# Patient Record
Sex: FEMALE | Race: WHITE | NOT HISPANIC OR LATINO | Employment: OTHER | URBAN - METROPOLITAN AREA
[De-identification: names, ages, dates, MRNs, and addresses within clinical notes are randomized per-mention and may not be internally consistent; named-entity substitution may affect disease eponyms.]

---

## 2018-11-19 ENCOUNTER — OFFICE VISIT (OUTPATIENT)
Dept: DERMATOLOGY | Facility: CLINIC | Age: 68
End: 2018-11-19
Payer: MEDICARE

## 2018-11-19 VITALS — BODY MASS INDEX: 18.78 KG/M2 | WEIGHT: 110 LBS | HEIGHT: 64 IN

## 2018-11-19 DIAGNOSIS — D18.01 CHERRY ANGIOMA: ICD-10-CM

## 2018-11-19 DIAGNOSIS — L81.4 SOLAR LENTIGO: ICD-10-CM

## 2018-11-19 DIAGNOSIS — L82.1 SEBORRHEIC KERATOSES: Primary | ICD-10-CM

## 2018-11-19 DIAGNOSIS — D23.9 DERMATOFIBROMA: ICD-10-CM

## 2018-11-19 DIAGNOSIS — D22.9 MULTIPLE BENIGN NEVI: ICD-10-CM

## 2018-11-19 PROCEDURE — 99203 OFFICE O/P NEW LOW 30 MIN: CPT | Mod: S$GLB,,, | Performed by: DERMATOLOGY

## 2018-11-19 PROCEDURE — 99999 PR PBB SHADOW E&M-EST. PATIENT-LVL II: CPT | Mod: PBBFAC,,, | Performed by: DERMATOLOGY

## 2018-11-19 RX ORDER — SERTRALINE HYDROCHLORIDE 50 MG/1
50 TABLET, FILM COATED ORAL DAILY
COMMUNITY
End: 2019-11-27 | Stop reason: SDUPTHER

## 2018-11-19 NOTE — PROGRESS NOTES
Subjective:       Patient ID:  Sylvia Maxwell is a 67 y.o. female who presents for   Chief Complaint   Patient presents with    Spot     right upper arm and right leg, x years, no change, no tx    Skin Check     TBSE     Initial visit  Intense sun exposure in youth and young adulthood  No tanning bed use  No personal h/o skin cancer  Requests TBSE for cancer screening        Spot  - Initial  Affected locations: right upper leg and right arm  Signs / symptoms: asymptomatic  Severity: mild  Timing: constant        Review of Systems   Constitutional: Negative for fever, chills and fatigue.   Skin: Positive for daily sunscreen use and activity-related sunscreen use. Negative for wears hat.   Hematologic/Lymphatic: Bruises/bleeds easily.        Objective:    Physical Exam   Constitutional: She appears well-developed and well-nourished. No distress.   Neurological: She is alert and oriented to person, place, and time. She is not disoriented.   Psychiatric: She has a normal mood and affect.   Skin:   Areas Examined (abnormalities noted in diagram):   Scalp / Hair Palpated and Inspected  Head / Face Inspection Performed  Neck Inspection Performed  Chest / Axilla Inspection Performed  Abdomen Inspection Performed  Genitals / Buttocks / Groin Inspection Performed  Back Inspection Performed  RUE Inspected  LUE Inspection Performed  RLE Inspected  LLE Inspection Performed  Nails and Digits Inspection Performed              Diagram Legend     Erythematous scaling macule/papule c/w actinic keratosis       Vascular papule c/w angioma      Pigmented verrucoid papule/plaque c/w seborrheic keratosis      Yellow umbilicated papule c/w sebaceous hyperplasia      Irregularly shaped tan macule c/w lentigo     1-2 mm smooth white papules consistent with Milia      Movable subcutaneous cyst with punctum c/w epidermal inclusion cyst      Subcutaneous movable cyst c/w pilar cyst      Firm pink to brown papule c/w dermatofibroma       Pedunculated fleshy papule(s) c/w skin tag(s)      Evenly pigmented macule c/w junctional nevus     Mildly variegated pigmented, slightly irregular-bordered macule c/w mildly atypical nevus      Flesh colored to evenly pigmented papule c/w intradermal nevus       Pink pearly papule/plaque c/w basal cell carcinoma      Erythematous hyperkeratotic cursted plaque c/w SCC      Surgical scar with no sign of skin cancer recurrence      Open and closed comedones      Inflammatory papules and pustules      Verrucoid papule consistent consistent with wart     Erythematous eczematous patches and plaques     Dystrophic onycholytic nail with subungual debris c/w onychomycosis     Umbilicated papule    Erythematous-base heme-crusted tan verrucoid plaque consistent with inflamed seborrheic keratosis     Erythematous Silvery Scaling Plaque c/w Psoriasis     See annotation      Assessment / Plan:        Seborrheic keratoses  These are benign inherited growths without a malignant potential. Reassurance given to patient. No treatment is necessary.     Solar lentigo  This is a benign hyperpigmented sun induced lesion. Daily sun protection will reduce the number of new lesions. Treatment of these benign lesions are considered cosmetic.    Cherry angioma  This is a benign vascular lesion. Reassurance given. No treatment required.     Dermatofibroma  This is a benign scar-like lesion secondary to minor trauma. No treatment required.     Multiple benign nevi  Discussed ABCDE's of nevi.  Monitor for new mole or moles that are becoming bigger, darker, irritated, or developing irregular borders. Brochure provided.    Patient instructed in importance in daily sun protection of at least spf 30. Mineral sunscreen ingredients preferred (Zinc +/- Titanium).   Recommend Elta MD for daily use on face and neck.  Patient encouraged to wear hat for all outdoor exposure.   Also discussed sun avoidance and use of protective clothing.           Follow-up  in about 1 year (around 11/19/2019).

## 2019-07-30 DIAGNOSIS — Z12.39 SCREENING BREAST EXAMINATION: Primary | ICD-10-CM

## 2019-09-04 ENCOUNTER — LAB VISIT (OUTPATIENT)
Dept: LAB | Facility: HOSPITAL | Age: 69
End: 2019-09-04
Attending: INTERNAL MEDICINE
Payer: MEDICARE

## 2019-09-04 DIAGNOSIS — E78.5 HYPERLIPEMIA: Primary | ICD-10-CM

## 2019-09-04 LAB
CHOLEST SERPL-MCNC: 218 MG/DL (ref 120–199)
CHOLEST/HDLC SERPL: 3 {RATIO} (ref 2–5)
HDLC SERPL-MCNC: 72 MG/DL (ref 40–75)
HDLC SERPL: 33 % (ref 20–50)
LDLC SERPL CALC-MCNC: 128 MG/DL (ref 63–159)
NONHDLC SERPL-MCNC: 146 MG/DL
TRIGL SERPL-MCNC: 90 MG/DL (ref 30–150)

## 2019-09-04 PROCEDURE — 80061 LIPID PANEL: CPT

## 2019-09-04 PROCEDURE — 36415 COLL VENOUS BLD VENIPUNCTURE: CPT

## 2019-09-20 ENCOUNTER — HOSPITAL ENCOUNTER (OUTPATIENT)
Dept: RADIOLOGY | Facility: HOSPITAL | Age: 69
Discharge: HOME | End: 2019-09-20
Attending: SPECIALIST
Payer: MEDICARE

## 2019-09-20 VITALS — BODY MASS INDEX: 18.78 KG/M2 | HEIGHT: 64 IN | WEIGHT: 110 LBS

## 2019-09-20 DIAGNOSIS — Z12.39 SCREENING BREAST EXAMINATION: ICD-10-CM

## 2019-09-20 PROCEDURE — 77067 SCR MAMMO BI INCL CAD: CPT | Mod: TC,PO

## 2019-09-25 ENCOUNTER — CLINICAL SUPPORT (OUTPATIENT)
Dept: FAMILY MEDICINE | Facility: CLINIC | Age: 69
End: 2019-09-25
Payer: MEDICARE

## 2019-09-25 DIAGNOSIS — Z23 NEED FOR INFLUENZA VACCINATION: Primary | ICD-10-CM

## 2019-09-25 PROCEDURE — G0008 ADMIN INFLUENZA VIRUS VAC: HCPCS | Mod: S$GLB,,, | Performed by: NURSE PRACTITIONER

## 2019-09-25 PROCEDURE — 90662 IIV NO PRSV INCREASED AG IM: CPT | Mod: S$GLB,,, | Performed by: NURSE PRACTITIONER

## 2019-09-25 PROCEDURE — G0008 FLU VACCINE - HIGH DOSE (65+) PRESERVATIVE FREE IM: ICD-10-PCS | Mod: S$GLB,,, | Performed by: NURSE PRACTITIONER

## 2019-09-25 PROCEDURE — 90662 FLU VACCINE - HIGH DOSE (65+) PRESERVATIVE FREE IM: ICD-10-PCS | Mod: S$GLB,,, | Performed by: NURSE PRACTITIONER

## 2019-11-18 ENCOUNTER — HOSPITAL ENCOUNTER (EMERGENCY)
Facility: HOSPITAL | Age: 69
Discharge: HOME | End: 2019-11-18
Attending: EMERGENCY MEDICINE
Payer: MEDICARE

## 2019-11-18 ENCOUNTER — TELEPHONE (OUTPATIENT)
Dept: FAMILY MEDICINE | Facility: CLINIC | Age: 69
End: 2019-11-18

## 2019-11-18 VITALS
SYSTOLIC BLOOD PRESSURE: 121 MMHG | HEART RATE: 61 BPM | HEIGHT: 63 IN | BODY MASS INDEX: 19.49 KG/M2 | TEMPERATURE: 98 F | WEIGHT: 110 LBS | DIASTOLIC BLOOD PRESSURE: 64 MMHG | RESPIRATION RATE: 18 BRPM | OXYGEN SATURATION: 100 %

## 2019-11-18 DIAGNOSIS — R07.9 NONSPECIFIC CHEST PAIN: Primary | ICD-10-CM

## 2019-11-18 PROBLEM — R07.89 ATYPICAL CHEST PAIN: Status: ACTIVE | Noted: 2019-11-18

## 2019-11-18 LAB
ALBUMIN SERPL BCP-MCNC: 4.4 G/DL (ref 3.5–5.2)
ALP SERPL-CCNC: 59 U/L (ref 55–135)
ALT SERPL W/O P-5'-P-CCNC: 20 U/L (ref 10–44)
AMPHET+METHAMPHET UR QL: NEGATIVE
AMYLASE SERPL-CCNC: 38 U/L (ref 20–110)
ANION GAP SERPL CALC-SCNC: 9 MMOL/L (ref 8–16)
APTT PPP: 32.3 SEC (ref 23.6–33.3)
AST SERPL-CCNC: 25 U/L (ref 10–40)
BARBITURATES UR QL SCN>200 NG/ML: NEGATIVE
BASOPHILS # BLD AUTO: 0.09 K/UL (ref 0–0.2)
BASOPHILS NFR BLD: 1.4 % (ref 0–1.9)
BENZODIAZ UR QL SCN>200 NG/ML: NEGATIVE
BILIRUB SERPL-MCNC: 1.2 MG/DL (ref 0.1–1)
BILIRUB UR QL STRIP: NEGATIVE
BNP SERPL-MCNC: 133 PG/ML (ref 0–99)
BUN SERPL-MCNC: 14 MG/DL (ref 8–23)
BZE UR QL SCN: NEGATIVE
CALCIUM SERPL-MCNC: 9.4 MG/DL (ref 8.7–10.5)
CANNABINOIDS UR QL SCN: NEGATIVE
CHLORIDE SERPL-SCNC: 98 MMOL/L (ref 95–110)
CK MB SERPL-MCNC: 1.8 NG/ML (ref 0.1–6.5)
CK SERPL-CCNC: 77 U/L (ref 20–180)
CLARITY UR: CLEAR
CO2 SERPL-SCNC: 28 MMOL/L (ref 23–29)
COLOR UR: COLORLESS
CREAT SERPL-MCNC: 0.5 MG/DL (ref 0.5–1.4)
CREAT UR-MCNC: 21 MG/DL (ref 15–325)
D DIMER PPP IA.FEU-MCNC: 1.33 UG/ML FEU
DIFFERENTIAL METHOD: ABNORMAL
EOSINOPHIL # BLD AUTO: 0 K/UL (ref 0–0.5)
EOSINOPHIL NFR BLD: 0.2 % (ref 0–8)
ERYTHROCYTE [DISTWIDTH] IN BLOOD BY AUTOMATED COUNT: 12.7 % (ref 11.5–14.5)
EST. GFR  (AFRICAN AMERICAN): >60 ML/MIN/1.73 M^2
EST. GFR  (NON AFRICAN AMERICAN): >60 ML/MIN/1.73 M^2
GLUCOSE SERPL-MCNC: 93 MG/DL (ref 70–110)
GLUCOSE UR QL STRIP: NEGATIVE
HCT VFR BLD AUTO: 37.2 % (ref 37–48.5)
HGB BLD-MCNC: 12.5 G/DL (ref 12–16)
HGB UR QL STRIP: NEGATIVE
IMM GRANULOCYTES # BLD AUTO: 0.02 K/UL (ref 0–0.04)
IMM GRANULOCYTES NFR BLD AUTO: 0.3 % (ref 0–0.5)
INR PPP: 1
KETONES UR QL STRIP: ABNORMAL
LEUKOCYTE ESTERASE UR QL STRIP: NEGATIVE
LIPASE SERPL-CCNC: 32 U/L (ref 4–60)
LYMPHOCYTES # BLD AUTO: 1.3 K/UL (ref 1–4.8)
LYMPHOCYTES NFR BLD: 20.4 % (ref 18–48)
MAGNESIUM SERPL-MCNC: 2.3 MG/DL (ref 1.6–2.6)
MCH RBC QN AUTO: 31.7 PG (ref 27–31)
MCHC RBC AUTO-ENTMCNC: 33.6 G/DL (ref 32–36)
MCV RBC AUTO: 94 FL (ref 82–98)
MONOCYTES # BLD AUTO: 0.6 K/UL (ref 0.3–1)
MONOCYTES NFR BLD: 9.5 % (ref 4–15)
NEUTROPHILS # BLD AUTO: 4.3 K/UL (ref 1.8–7.7)
NEUTROPHILS NFR BLD: 68.2 % (ref 38–73)
NITRITE UR QL STRIP: NEGATIVE
NRBC BLD-RTO: 0 /100 WBC
OPIATES UR QL SCN: NEGATIVE
PCP UR QL SCN>25 NG/ML: NEGATIVE
PH UR STRIP: 8 [PH] (ref 5–8)
PLATELET # BLD AUTO: 198 K/UL (ref 150–350)
PMV BLD AUTO: 11.7 FL (ref 9.2–12.9)
POTASSIUM SERPL-SCNC: 4 MMOL/L (ref 3.5–5.1)
PROT SERPL-MCNC: 7.3 G/DL (ref 6–8.4)
PROT UR QL STRIP: NEGATIVE
PROTHROMBIN TIME: 13.1 SEC (ref 10.6–14.8)
RBC # BLD AUTO: 3.94 M/UL (ref 4–5.4)
SODIUM SERPL-SCNC: 135 MMOL/L (ref 136–145)
SP GR UR STRIP: 1 (ref 1–1.03)
TOXICOLOGY INFORMATION: NORMAL
TROPONIN I SERPL DL<=0.01 NG/ML-MCNC: <0.03 NG/ML (ref 0.02–0.04)
TROPONIN I SERPL DL<=0.01 NG/ML-MCNC: <0.03 NG/ML (ref 0.02–0.04)
URN SPEC COLLECT METH UR: ABNORMAL
UROBILINOGEN UR STRIP-ACNC: NEGATIVE EU/DL
WBC # BLD AUTO: 6.24 K/UL (ref 3.9–12.7)

## 2019-11-18 PROCEDURE — 84484 ASSAY OF TROPONIN QUANT: CPT | Mod: 91

## 2019-11-18 PROCEDURE — 85730 THROMBOPLASTIN TIME PARTIAL: CPT

## 2019-11-18 PROCEDURE — 80053 COMPREHEN METABOLIC PANEL: CPT

## 2019-11-18 PROCEDURE — 81003 URINALYSIS AUTO W/O SCOPE: CPT | Mod: 59

## 2019-11-18 PROCEDURE — 99285 EMERGENCY DEPT VISIT HI MDM: CPT | Mod: 25

## 2019-11-18 PROCEDURE — 85610 PROTHROMBIN TIME: CPT

## 2019-11-18 PROCEDURE — 36415 COLL VENOUS BLD VENIPUNCTURE: CPT

## 2019-11-18 PROCEDURE — 83690 ASSAY OF LIPASE: CPT

## 2019-11-18 PROCEDURE — 85025 COMPLETE CBC W/AUTO DIFF WBC: CPT

## 2019-11-18 PROCEDURE — 93005 ELECTROCARDIOGRAM TRACING: CPT

## 2019-11-18 PROCEDURE — 83880 ASSAY OF NATRIURETIC PEPTIDE: CPT

## 2019-11-18 PROCEDURE — 82150 ASSAY OF AMYLASE: CPT

## 2019-11-18 PROCEDURE — 82550 ASSAY OF CK (CPK): CPT

## 2019-11-18 PROCEDURE — 85379 FIBRIN DEGRADATION QUANT: CPT

## 2019-11-18 PROCEDURE — 83735 ASSAY OF MAGNESIUM: CPT

## 2019-11-18 PROCEDURE — 25500020 PHARM REV CODE 255: Performed by: EMERGENCY MEDICINE

## 2019-11-18 PROCEDURE — 82553 CREATINE MB FRACTION: CPT

## 2019-11-18 PROCEDURE — 84484 ASSAY OF TROPONIN QUANT: CPT

## 2019-11-18 PROCEDURE — 80307 DRUG TEST PRSMV CHEM ANLYZR: CPT

## 2019-11-18 RX ORDER — METHOCARBAMOL 500 MG/1
500 TABLET, FILM COATED ORAL EVERY 8 HOURS PRN
Qty: 8 TABLET | Refills: 0 | Status: SHIPPED | OUTPATIENT
Start: 2019-11-18 | End: 2019-11-23

## 2019-11-18 RX ADMIN — IOHEXOL 70 ML: 350 INJECTION, SOLUTION INTRAVENOUS at 09:11

## 2019-11-18 NOTE — TELEPHONE ENCOUNTER
----- Message from Alena Jackson sent at 11/18/2019 10:55 AM CST -----  Pt wants to be seen today. Pt is having left shoulder blade pain and it is radiating to her left breast as well. Pt #[phone]

## 2019-11-18 NOTE — ED NOTES
PT PRESENTS TO ED WITH C/O LEFT SHOULDER AND CHEST PAIN. NAD NOTED, CALL LIGHT IN REACH, WILL MONITOR.

## 2019-11-19 NOTE — SUBJECTIVE & OBJECTIVE
Past medical history:  Tendonitis, cervical radiculopathy, hyperlipidemia    Past Surgical History:   Procedure Laterality Date    BREAST CYST ASPIRATION Left     left wrist      arthritis    TUBAL LIGATION         Review of patient's allergies indicates:  No Known Allergies    No current facility-administered medications on file prior to encounter.      Current Outpatient Medications on File Prior to Encounter   Medication Sig    calcium-vitamin D3 (CALCIUM 500 + D) 500 mg(1,250mg) -200 unit per tablet Take 1 tablet by mouth 2 (two) times daily with meals.    multivit-min-iron-FA-K.gin (CENTRUM PERFORMANCE) 18 mg iron-400 mcg-50 mg Tab Take 1 tablet by mouth once daily.     sertraline (ZOLOFT) 50 MG tablet Take 50 mg by mouth once daily.    ranitidine (ZANTAC) 150 MG tablet Take 150 mg by mouth 2 (two) times daily.    [DISCONTINUED] diazepam (VALIUM) 2 MG tablet Take 1 tablet (2 mg total) by mouth as directed.     Family History     Reviewed and noncontributory        Tobacco Use    Smoking status: Former Smoker    Smokeless tobacco: Never Used   Substance and Sexual Activity    Alcohol use: Not on file    Drug use: Not on file    Sexual activity: Not on file       Objective:     Vital Signs (Most Recent):  Temp: 98.2 °F (36.8 °C) (11/18/19 1500)  Pulse: 66 (11/18/19 1900)  Resp: 16 (11/18/19 1900)  BP: 129/80 (11/18/19 1900)  SpO2: 99 % (11/18/19 1900) Vital Signs (24h Range):  Temp:  [98.2 °F (36.8 °C)] 98.2 °F (36.8 °C)  Pulse:  [66-75] 66  Resp:  [16-63] 16  SpO2:  [99 %-100 %] 99 %  BP: (120-133)/(62-80) 129/80     Weight: 49.9 kg (110 lb)  Body mass index is 19.49 kg/m².    Physical Exam   Constitutional: She is oriented to person, place, and time. She appears well-developed. She is cooperative.   HENT:   Head: Normocephalic and atraumatic.   Mouth/Throat: Oropharynx is clear and moist.   Eyes: Pupils are equal, round, and reactive to light. Conjunctivae and EOM are normal.   Neck: Neck supple.  No JVD present. No thyromegaly present.   Cardiovascular: Normal rate, regular rhythm, S1 normal, S2 normal and intact distal pulses. Exam reveals no gallop and no friction rub.   No murmur heard.  Pulmonary/Chest: Effort normal and breath sounds normal. No accessory muscle usage. No tachypnea. No respiratory distress. She has no wheezes. She has no rales.   Abdominal: Soft. Bowel sounds are normal. She exhibits no distension and no mass. There is no tenderness. There is no rebound and no guarding.   Musculoskeletal: Normal range of motion. She exhibits no edema, tenderness or deformity.   Neurological: She is alert and oriented to person, place, and time. She has normal strength and normal reflexes. No cranial nerve deficit or sensory deficit.   Skin: Skin is warm. No rash noted.   Psychiatric: She has a normal mood and affect. Her speech is normal and behavior is normal. Cognition and memory are normal.       Significant Labs:   CBC:   Recent Labs   Lab 11/18/19  1510   WBC 6.24   HGB 12.5   HCT 37.2        CMP:   Recent Labs   Lab 11/18/19  1510   *   K 4.0   CL 98   CO2 28   GLU 93   BUN 14   CREATININE 0.5   CALCIUM 9.4   PROT 7.3   ALBUMIN 4.4   BILITOT 1.2*   ALKPHOS 59   AST 25   ALT 20   ANIONGAP 9   EGFRNONAA >60.0     Cardiac Markers:   Recent Labs   Lab 11/18/19  1840   *       Significant Imaging: I have reviewed all pertinent imaging results/findings within the past 24 hours.     Imaging Results          X-Ray Chest PA And Lateral (Final result)  Result time 11/18/19 15:59:28   Procedure changed from X-Ray Chest AP Portable     Final result by Alejandra Small MD (11/18/19 15:59:28)                 Impression:      Normal chest.      Electronically signed by: Alejandra Small MD  Date:    11/18/2019  Time:    15:59             Narrative:    EXAMINATION:  XR CHEST PA AND LATERAL    CLINICAL HISTORY:  Pain;  Chest pain, unspecified    FINDINGS:  PA and lateral chest is compared to  07/16/2017 shows normal cardiomediastinal silhouette.    Lungs are clear. Pulmonary vasculature is normal. No acute osseous abnormality.

## 2019-11-19 NOTE — HPI
Patient is a 68-year-old  female with history of hyperlipidemia as well as several musculoskeletal issues including but not limited to cervical spondylosis, degenerative disc disease, cervical radiculopathy and left shoulder tendinitis presents to the ED with chief complaint of acute onset chest pain.    Chest pain started acutely yesterday.  It is described as dull in nature.  No radiation of pain. Is located over the left chest wall.  No exacerbating or relieving factors.  No symptoms in the past.  Also has associated left posterior shoulder pain. Patient denies associated shortness of breath, diaphoresis, lightheadedness/dizziness, palpitations or lower extremity edema. Reports having had a stress test done in 2016 which is unremarkable.  Saw her cardiologist about 2 months ago at which time she was not mildly elevated total cholesterol level which is being managed by lifestyle modification.    In the ER:  Hemodynamically stable.  Lab work including initial troponin within normal limits.  EKG revealed normal sinus rhythm without any acute ST T wave changes.  Chest x-ray without any acute cardiopulmonary process.  D-dimer slightly elevated. She is getting a CTA chest to rule out pulmonary embolism.    Rest of the 10 point review of systems is negative except as mentioned above.

## 2019-11-19 NOTE — ED PROVIDER NOTES
Encounter Date: 11/18/2019       History     Chief Complaint   Patient presents with    Chest Pain     This is a pleasant 68-year-old female who presents complaining of left upper neck pain after lifting a heavy object as well as intermittent left anterior chest pain. The patient states that yesterday she lifted a heavy concrete base.  She has also been lifting other objects recently.  Yesterday evening she developed an achy pain to the left trapezius and scapular area that was worse with palpation and movement and better with certain neck positioning.  The patient also states that her neck had been feeling slightly sore after sleeping on a sofa.  The patient reports that today she has in intermittent but now resolved episodes of some aching pain to her left upper anterior chest.  The pain seems to be associated with the trapezius pain and scapular pain and is at times worse with movement and palpation. She denies any associated chest pain, shortness of breath or pleuritic pain. She denies any weakness dizziness lightheadedness palpitations syncope or near-syncope.  She denies nausea or diaphoresis.  The pain is not exertional in nature.  After the pain is started she walked up a flight of stairs and did not have any pain or discomfort while walking up the stairs.  She does exercise on a treadmill on a regular basis and has never had chest pain or any shortness of breath while exercising.  Patient has had neck problems in the past.  She feels the symptoms are similar to that but was uncertain if the chest pain were related or not.  She does not smoke.  She denies having any family history of coronary artery disease.  She has had a previous cardiac evaluation with Dr. Aguillon  And reports a hx of MVP.  She feels well otherwise and denies any other problems or complaints. Patient states she had a negative angiogram in 2004.        Review of patient's allergies indicates:  No Known Allergies  No past medical history  on file.  Past Surgical History:   Procedure Laterality Date    BREAST CYST ASPIRATION Left     left wrist      arthritis    TUBAL LIGATION       Family History   Problem Relation Age of Onset    Eczema Neg Hx     Lupus Neg Hx     Psoriasis Neg Hx     Melanoma Neg Hx      Social History     Tobacco Use    Smoking status: Former Smoker    Smokeless tobacco: Never Used   Substance Use Topics    Alcohol use: Not on file    Drug use: Not on file     Review of Systems   Constitutional: Negative for activity change, appetite change, chills, diaphoresis, fatigue, fever and unexpected weight change.   HENT: Negative for congestion, dental problem, ear pain, rhinorrhea, sinus pressure, sinus pain, sore throat and trouble swallowing.    Eyes: Negative for photophobia, pain, redness and visual disturbance.   Respiratory: Negative for cough, choking, chest tightness, shortness of breath and wheezing.    Cardiovascular: Positive for chest pain. Negative for palpitations and leg swelling.   Gastrointestinal: Negative for abdominal distention, abdominal pain, anal bleeding, blood in stool, constipation, diarrhea, nausea and vomiting.   Genitourinary: Negative for decreased urine volume, difficulty urinating, dysuria, flank pain, frequency, hematuria, pelvic pain and urgency.   Musculoskeletal: Positive for back pain. Negative for arthralgias, gait problem, joint swelling, myalgias, neck pain and neck stiffness.        Left upper back pain and trapezius pain after lifting the heavy object.  No other myalgias or arthralgias.   Skin: Negative for pallor and rash.   Neurological: Negative.  Negative for dizziness, tremors, seizures, syncope, facial asymmetry, speech difficulty, weakness, light-headedness, numbness and headaches.   Hematological: Negative.  Does not bruise/bleed easily.   Psychiatric/Behavioral: Negative.  Negative for confusion.   All other systems reviewed and are negative.      Physical Exam     Initial  Vitals [11/18/19 1500]   BP Pulse Resp Temp SpO2   128/62 75 17 98.2 °F (36.8 °C) 99 %      MAP       --         Physical Exam    Nursing note and vitals reviewed.  Constitutional: She is active and cooperative.  Non-toxic appearance. She does not have a sickly appearance. She does not appear ill. No distress.   HENT:   Head: Normocephalic and atraumatic.   Nose: Nose normal.   Mouth/Throat: Uvula is midline, oropharynx is clear and moist and mucous membranes are normal. No oropharyngeal exudate.   Eyes: Conjunctivae, EOM and lids are normal. Pupils are equal, round, and reactive to light. No scleral icterus.   Neck: Trachea normal, normal range of motion, full passive range of motion without pain and phonation normal. Neck supple. No thyroid mass and no thyromegaly present. No stridor present. Muscular tenderness present. No spinous process tenderness present. No edema, no erythema and normal range of motion present. No neck rigidity. No JVD present.   Left trapezius and upper back/scapular muscular pain on palpation. No edema no ecchymosis no skin changes or increased warmth.  Left upper anterior chest wall with mild tenderness on palpation.   Cardiovascular: Normal rate, regular rhythm, normal heart sounds, intact distal pulses and normal pulses.   No murmur heard.  Pulmonary/Chest: Effort normal and breath sounds normal. No accessory muscle usage. No tachypnea. No respiratory distress.   Abdominal: Soft. Normal appearance and bowel sounds are normal. She exhibits no distension, no pulsatile midline mass and no mass. There is no tenderness. There is no rigidity, no guarding and no CVA tenderness.   Musculoskeletal: Normal range of motion. She exhibits no edema or tenderness.   Pulses are 2+ throughout, cap refill is less than 2 sec throughout, extremities are nontender throughout with full range of motion. There is no spinal tenderness to palpation.   Neurological: She is alert and oriented to person, place, and  time. She has normal strength. She displays normal reflexes. No cranial nerve deficit or sensory deficit.   No focal deficits.   Skin: Skin is intact. Capillary refill takes less than 2 seconds. No ecchymosis, no petechiae and no rash noted. No erythema. No pallor.   Psychiatric: She has a normal mood and affect. Her speech is normal and behavior is normal. Judgment and thought content normal. Cognition and memory are normal.         ED Course   Procedures  Labs Reviewed   URINALYSIS, REFLEX TO URINE CULTURE - Abnormal; Notable for the following components:       Result Value    Color, UA Colorless (*)     Specific [place], UA 1.000 (*)     Ketones, UA Trace (*)     All other components within normal limits    Narrative:     Specimen Source->Urine   COMPREHENSIVE METABOLIC PANEL - Abnormal; Notable for the following components:    Sodium 135 (*)     Total Bilirubin 1.2 (*)     All other components within normal limits   CBC W/ AUTO DIFFERENTIAL - Abnormal; Notable for the following components:    RBC 3.94 (*)     Mean Corpuscular Hemoglobin 31.7 (*)     All other components within normal limits   D DIMER, QUANTITATIVE - Abnormal; Notable for the following components:    D-Dimer 1.33 (*)     All other components within normal limits   B-TYPE NATRIURETIC PEPTIDE - Abnormal; Notable for the following components:     (*)     All other components within normal limits   TROPONIN I   DRUG SCREEN PANEL, URINE EMERGENCY    Narrative:     Specimen Source->Urine   CK   CK-MB   APTT   PROTIME-INR   AMYLASE   LIPASE   MAGNESIUM   D DIMER, QUANTITATIVE   B-TYPE NATRIURETIC PEPTIDE   TROPONIN I        ECG Results          EKG 12-lead (In process)  Result time 11/18/19 15:56:32    In process by Interface, Lab In [facility] (11/18/19 15:56:32)                 Narrative:    Test Reason : R07.9,    Vent. Rate : 062 BPM     Atrial Rate : 062 BPM     P-R Int : 144 ms          QRS Dur : 084 ms      QT Int : 416 ms       P-R-T Axes :  -15 070 066 degrees     QTc Int : 422 ms    Normal sinus rhythm  Normal ECG  No previous ECGs available    Referred By: [email or web address]   SELF           Confirmed By:                             Imaging Results          CTA Chest Non-Coronary (PE Study) (Final result)  Result time 11/18/19 21:16:02    Final result by Christopher Thomas MD (11/18/19 21:16:02)                 Narrative:        Exam: CTA OF THE CHEST WITH CONTRAST    Clinical data: Chest pain.    Technique: Axial CT angiography images through the lungs were acquired with  contrast and imaged using soft tissue and lung algorithms. Coronal, sagittal,  and 3D volume reconstructions were performed. Reformatted/3D-MPR images were  performed. Contrast used: Omnipaque 350. Amount: 100 mL. Radiation dose: CTDIvol  = 13.50  mGy, DLP = 138.80 mGy x cm.    Prior studies: No prior studies submitted.    Findings:    Lungs: Minimal dependent bibasilar groundglass opacities are identified.  No  pulmonary infiltrate identified. No pulmonary mass identified. No pleural  effusions identified. No pneumothorax. The airway is clear.    Soft Tissues: No mediastinal, axillary or supraclavicular adenopathy is  identified.    Vascular: No filling defect within the pulmonary arteries to the segmental  branch level. Unremarkable aorta. Grossly unremarkable sized heart.  No definite  abnormality seen on 3D reformatted images.    Bony structures: No acute or destructive abnormality    Upper Abdomen: Limited visualization of the solid upper abdominal organs is  grossly unremarkable.    IMPRESSION:    1.  No acute pulmonary thromboembolism, aortic dissection or thrombosis.  2.  No acute lung parenchymal abnormality, pleural or pericardial effusion.  3.  Minimal dependent bibasilar groundglass opacities.    Recommendation:  Follow up as clinically indicated.    All CT scans at this facility utilize dose modulation, iterative reconstruction,  and/or weight based dosing when appropriate to  reduce radiation dose to as low  as reasonably achievable.      Electronically Signed by MAURA OLIVEIRA MD at 11/18/2019 10:56:24 PM                             X-Ray Chest PA And Lateral (Final result)  Result time 11/18/19 15:59:28   Procedure changed from X-Ray Chest AP Portable     Final result by Alejandra Small MD (11/18/19 15:59:28)                 Impression:      Normal chest.      Electronically signed by: Alejandra Small MD  Date:    11/18/2019  Time:    15:59             Narrative:    EXAMINATION:  XR CHEST PA AND LATERAL    CLINICAL HISTORY:  Pain;  Chest pain, unspecified    FINDINGS:  PA and lateral chest is compared to 07/16/2017 shows normal cardiomediastinal silhouette.    Lungs are clear. Pulmonary vasculature is normal. No acute osseous abnormality.                                 Medical Decision Making:   Clinical Tests:   Lab Tests: Reviewed  Radiological Study: Reviewed  Medical Tests: Reviewed  ED Management:  I have consulted the hospitalist physician for admission.  Pain is atypical however the patient has not had any recent cardiac testing.  Pain is reproducible with palpation and did occur after lifting heavy object.  Patient has had neck problems and cervical radiculopathy in the past.  Patient has not had any exertional chest pain and has not had any shortness of breath nausea diaphoresis fatigue or other constitutional symptoms.  feel that the pt can be discharged to follow up outpatient with her cardiologist as he feels that symptoms are not currently suggestive of a cardiac etiology.  D-dimer was elevated.  The patient has no lower extremity pain or swelling. CTA of the chest is negative for pulmonary embolism.  I have also discussed the case with  -- cardiologist -- on-call for the patient's cardiologist .  He recommended a 2nd troponin as suggested as well by  and if negative then outpatient follow-up.  The patient strongly  preferred outpatient follow-up and did not want to be admitted to the hospital.  After hospitalist consultation and their recommendations of outpatient evaluation as well as Cardiology consultation and recommendation for outpatient evaluation along with patient's preference, patient will be discharged to follow up closely outpatient with her primary care physician and cardiologist.  Will prescribe Robaxin for the possibility of cervical radiculopathy/muscle spasms as a source of her discomfort.  Have discussed the appropriate pillow when sleeping.  Return precautions have been discussed in detail and strict importance of close outpatient evaluation has been discussed.  Patient voices understanding, feels well it is asymptomatic and feels ready to go home.                                 Clinical Impression:       ICD-10-CM ICD-9-CM   1. Nonspecific chest pain R07.9 786.50   2. Cervical radiculopathy                          Tricia Lockhart MD  11/19/19 0015

## 2019-11-19 NOTE — ASSESSMENT & PLAN NOTE
Could be musculoskeletal.  Less likely ACS  EKG with normal sinus rhythm and no acute ST- T wave changes  D-dimer elevated for which CTA chest was done - negative for pulmonary embolism  ACS ruled out by troponin x 2  Stable for discharge home with plan for outpatient stress test  Patient agreeable with this plan and is aware to contact her cardiologist tomorrow to schedule outpatient stress test

## 2019-11-19 NOTE — DISCHARGE INSTRUCTIONS
Please read and follow discharge instructions and return precautions.  Rest, avoid any strenuous activity or lifting.  Use a firm contour pillow when sleeping.  Robaxin as directed for possible muscle spasms--do not drive, drink alcohol or operate machinery when taking this medication.  Tylenol or ibuprofen over-the-counter as directed and if needed for neck pain or discomfort.  Very important to follow up closely outpatient with  tomorrow.  Important to see your primary care physician in the next 1-2 days.  Return immediately if you develop new symptoms, if you have any worsening of symptoms, or if you develop any symptoms such as shortness of breath lightheadedness nausea palpitations or any worsening of current symptoms.

## 2019-11-19 NOTE — CONSULTS
[facility] Medicine  Consult Note    Patient Name: Sylvia Maxwell  MRN: [id number]  Admission Date: 11/18/2019  Hospital Length of Stay: 0 days  Attending Physician: Tricia Lockhart MD   Primary Care Provider: Damion Puckett MD           Patient information was obtained from patient and ER records.     Consult requested by ER physician.    Subjective:     Principal Problem: Atypical chest pain    Chief Complaint:   Chief Complaint   Patient presents with    Chest Pain        HPI: Patient is a 68-year-old  female with history of hyperlipidemia as well as several musculoskeletal issues including but not limited to cervical spondylosis, degenerative disc disease, cervical radiculopathy and left shoulder tendinitis presents to the ED with chief complaint of acute onset chest pain.    Chest pain started acutely yesterday.  It is described as dull in nature.  No radiation of pain. Is located over the left chest wall.  No exacerbating or relieving factors.  No symptoms in the past.  Also has associated left posterior shoulder pain. Patient denies associated shortness of breath, diaphoresis, lightheadedness/dizziness, palpitations or lower extremity edema. Reports having had a stress test done in 2016 which is unremarkable.  Saw her cardiologist about 2 months ago at which time she was not mildly elevated total cholesterol level which is being managed by lifestyle modification.    In the ER:  Hemodynamically stable.  Lab work including initial troponin within normal limits.  EKG revealed normal sinus rhythm without any acute ST T wave changes.  Chest x-ray without any acute cardiopulmonary process.  D-dimer slightly elevated. She is getting a CTA chest to rule out pulmonary embolism.    Rest of the 10 point review of systems is negative except as mentioned above.    Past medical history:  Tendonitis, cervical radiculopathy, hyperlipidemia           Past Surgical History:   Procedure  Laterality Date    BREAST CYST ASPIRATION Left      left wrist         arthritis    TUBAL LIGATION             Review of patient's allergies indicates:  No Known Allergies     No current facility-administered medications on file prior to encounter.            Current Outpatient Medications on File Prior to Encounter   Medication Sig    calcium-vitamin D3 (CALCIUM 500 + D) 500 mg(1,250mg) -200 unit per tablet Take 1 tablet by mouth 2 (two) times daily with meals.    multivit-min-iron-FA-K.gin (CENTRUM PERFORMANCE) 18 mg iron-400 mcg-50 mg Tab Take 1 tablet by mouth once daily.     sertraline (ZOLOFT) 50 MG tablet Take 50 mg by mouth once daily.    ranitidine (ZANTAC) 150 MG tablet Take 150 mg by mouth 2 (two) times daily.    [DISCONTINUED] diazepam (VALIUM) 2 MG tablet Take 1 tablet (2 mg total) by mouth as directed.          Family History      Reviewed and noncontributory               Tobacco Use    Smoking status: Former Smoker    Smokeless tobacco: Never Used   Substance and Sexual Activity    Alcohol use: Not on file    Drug use: Not on file    Sexual activity: Not on file         Objective:      Vital Signs (Most Recent):  Temp: 98.2 °F (36.8 °C) (11/18/19 1500)  Pulse: 66 (11/18/19 1900)  Resp: 16 (11/18/19 1900)  BP: 129/80 (11/18/19 1900)  SpO2: 99 % (11/18/19 1900) Vital Signs (24h Range):  Temp:  [98.2 °F (36.8 °C)] 98.2 °F (36.8 °C)  Pulse:  [66-75] 66  Resp:  [16-63] 16  SpO2:  [99 %-100 %] 99 %  BP: (120-133)/(62-80) 129/80      Weight: 49.9 kg (110 lb)  Body mass index is 19.49 kg/m².     Physical Exam   Constitutional: She is oriented to person, place, and time. She appears well-developed. She is cooperative.   HENT:   Head: Normocephalic and atraumatic.   Mouth/Throat: Oropharynx is clear and moist.   Eyes: Pupils are equal, round, and reactive to light. Conjunctivae and EOM are normal.   Neck: Neck supple. No JVD present. No thyromegaly present.   Cardiovascular: Normal rate, regular  rhythm, S1 normal, S2 normal and intact distal pulses. Exam reveals no gallop and no friction rub.   No murmur heard.  Pulmonary/Chest: Effort normal and breath sounds normal. No accessory muscle usage. No tachypnea. No respiratory distress. She has no wheezes. She has no rales.   Abdominal: Soft. Bowel sounds are normal. She exhibits no distension and no mass. There is no tenderness. There is no rebound and no guarding.   Musculoskeletal: Normal range of motion. She exhibits no edema, tenderness or deformity.   Neurological: She is alert and oriented to person, place, and time. She has normal strength and normal reflexes. No cranial nerve deficit or sensory deficit.   Skin: Skin is warm. No rash noted.   Psychiatric: She has a normal mood and affect. Her speech is normal and behavior is normal. Cognition and memory are normal.         Significant Labs:   CBC:       Recent Labs   Lab 11/18/19  1510   WBC 6.24   HGB 12.5   HCT 37.2         CMP:       Recent Labs   Lab 11/18/19  1510   *   K 4.0   CL 98   CO2 28   GLU 93   BUN 14   CREATININE 0.5   CALCIUM 9.4   PROT 7.3   ALBUMIN 4.4   BILITOT 1.2*   ALKPHOS 59   AST 25   ALT 20   ANIONGAP 9   EGFRNONAA >60.0      Cardiac Markers:       Recent Labs   Lab 11/18/19  1840   *         Significant Imaging: I have reviewed all pertinent imaging results/findings within the past 24 hours.          Imaging Results            X-Ray Chest PA And Lateral (Final result)  Result time 11/18/19 15:59:28   Procedure changed from X-Ray Chest AP Portable                 Final result by Alejandra Small MD (11/18/19 15:59:28)                               Impression:        Normal chest.        Electronically signed by:     Alejandra Small MD  Date:                                            11/18/2019  Time:                                            15:59                         Narrative:     EXAMINATION:  XR CHEST PA AND LATERAL     CLINICAL HISTORY:  Pain;   Chest pain, unspecified     FINDINGS:  PA and lateral chest is compared to 07/16/2017 shows normal cardiomediastinal silhouette.     Lungs are clear. Pulmonary vasculature is normal. No acute osseous abnormality.                                             Assessment/Plan:     * Atypical chest pain  Could be musculoskeletal.  Less likely ACS  EKG with normal sinus rhythm and no acute ST- T wave changes  D-dimer elevated for which CTA chest was done - negative for pulmonary embolism  ACS ruled out by troponin x 2  Stable for discharge home with plan for outpatient stress test  Patient agreeable with this plan and is aware to contact her cardiologist tomorrow to schedule outpatient stress test     Instructions provided to follow up with primary care physician as outpatient. Patient verbalized understanding and is aware to contact primary care physician or return to ED if new or worsening symptoms.    VTE Risk Mitigation (From admission, onward)    None              Thank you for your consult. I will sign off. Please contact us if you have any additional questions.    Neftali Baez MD  Department of Hospital Medicine   [facility]

## 2019-11-19 NOTE — H&P
[facility] Medicine  History & Physical    Patient Name: Sylvia Maxwell  MRN: [id number]  Admission Date: 11/18/2019  Attending Physician: Tricia Lockhart MD   Primary Care Provider: Damion Puckett MD         Patient information was obtained from patient and ER records.     Subjective:     Principal Problem:Atypical chest pain    Chief Complaint:   Chief Complaint   Patient presents with    Chest Pain        HPI: Patient is a 68-year-old  female with history of hyperlipidemia as well as several musculoskeletal issues including but not limited to cervical spondylosis, degenerative disc disease, cervical radiculopathy and left shoulder tendinitis presents to the ED with chief complaint of acute onset chest pain.    Chest pain started acutely yesterday.  It is described as dull in nature.  No radiation of pain. Is located over the left chest wall.  No exacerbating or relieving factors.  No symptoms in the past.  Also has associated left posterior shoulder pain. Patient denies associated shortness of breath, diaphoresis, lightheadedness/dizziness, palpitations or lower extremity edema. Reports having had a stress test done in 2016 which is unremarkable.  Saw her cardiologist about 2 months ago at which time she was not mildly elevated total cholesterol level which is being managed by lifestyle modification.    In the ER:  Hemodynamically stable.  Lab work including initial troponin within normal limits.  EKG revealed normal sinus rhythm without any acute ST T wave changes.  Chest x-ray without any acute cardiopulmonary process.  D-dimer slightly elevated. She is getting a CTA chest to rule out pulmonary embolism.    Rest of the 10 point review of systems is negative except as mentioned above.    Past medical history:  Tendonitis, cervical radiculopathy, hyperlipidemia    Past Surgical History:   Procedure Laterality Date    BREAST CYST ASPIRATION Left     left wrist      arthritis     TUBAL LIGATION         Review of patient's allergies indicates:  No Known Allergies    No current facility-administered medications on file prior to encounter.      Current Outpatient Medications on File Prior to Encounter   Medication Sig    calcium-vitamin D3 (CALCIUM 500 + D) 500 mg(1,250mg) -200 unit per tablet Take 1 tablet by mouth 2 (two) times daily with meals.    multivit-min-iron-FA-K.gin (CENTRUM PERFORMANCE) 18 mg iron-400 mcg-50 mg Tab Take 1 tablet by mouth once daily.     sertraline (ZOLOFT) 50 MG tablet Take 50 mg by mouth once daily.    ranitidine (ZANTAC) 150 MG tablet Take 150 mg by mouth 2 (two) times daily.    [DISCONTINUED] diazepam (VALIUM) 2 MG tablet Take 1 tablet (2 mg total) by mouth as directed.     Family History     Reviewed and noncontributory        Tobacco Use    Smoking status: Former Smoker    Smokeless tobacco: Never Used   Substance and Sexual Activity    Alcohol use: Not on file    Drug use: Not on file    Sexual activity: Not on file       Objective:     Vital Signs (Most Recent):  Temp: 98.2 °F (36.8 °C) (11/18/19 1500)  Pulse: 66 (11/18/19 1900)  Resp: 16 (11/18/19 1900)  BP: 129/80 (11/18/19 1900)  SpO2: 99 % (11/18/19 1900) Vital Signs (24h Range):  Temp:  [98.2 °F (36.8 °C)] 98.2 °F (36.8 °C)  Pulse:  [66-75] 66  Resp:  [16-63] 16  SpO2:  [99 %-100 %] 99 %  BP: (120-133)/(62-80) 129/80     Weight: 49.9 kg (110 lb)  Body mass index is 19.49 kg/m².    Physical Exam   Constitutional: She is oriented to person, place, and time. She appears well-developed. She is cooperative.   HENT:   Head: Normocephalic and atraumatic.   Mouth/Throat: Oropharynx is clear and moist.   Eyes: Pupils are equal, round, and reactive to light. Conjunctivae and EOM are normal.   Neck: Neck supple. No JVD present. No thyromegaly present.   Cardiovascular: Normal rate, regular rhythm, S1 normal, S2 normal and intact distal pulses. Exam reveals no gallop and no friction rub.   No murmur  heard.  Pulmonary/Chest: Effort normal and breath sounds normal. No accessory muscle usage. No tachypnea. No respiratory distress. She has no wheezes. She has no rales.   Abdominal: Soft. Bowel sounds are normal. She exhibits no distension and no mass. There is no tenderness. There is no rebound and no guarding.   Musculoskeletal: Normal range of motion. She exhibits no edema, tenderness or deformity.   Neurological: She is alert and oriented to person, place, and time. She has normal strength and normal reflexes. No cranial nerve deficit or sensory deficit.   Skin: Skin is warm. No rash noted.   Psychiatric: She has a normal mood and affect. Her speech is normal and behavior is normal. Cognition and memory are normal.       Significant Labs:   CBC:   Recent Labs   Lab 11/18/19  1510   WBC 6.24   HGB 12.5   HCT 37.2        CMP:   Recent Labs   Lab 11/18/19  1510   *   K 4.0   CL 98   CO2 28   GLU 93   BUN 14   CREATININE 0.5   CALCIUM 9.4   PROT 7.3   ALBUMIN 4.4   BILITOT 1.2*   ALKPHOS 59   AST 25   ALT 20   ANIONGAP 9   EGFRNONAA >60.0     Cardiac Markers:   Recent Labs   Lab 11/18/19  1840   *       Significant Imaging: I have reviewed all pertinent imaging results/findings within the past 24 hours.     Imaging Results          X-Ray Chest PA And Lateral (Final result)  Result time 11/18/19 15:59:28   Procedure changed from X-Ray Chest AP Portable     Final result by Alejandra Small MD (11/18/19 15:59:28)                 Impression:      Normal chest.      Electronically signed by: Alejandra Small MD  Date:    11/18/2019  Time:    15:59             Narrative:    EXAMINATION:  XR CHEST PA AND LATERAL    CLINICAL HISTORY:  Pain;  Chest pain, unspecified    FINDINGS:  PA and lateral chest is compared to 07/16/2017 shows normal cardiomediastinal silhouette.    Lungs are clear. Pulmonary vasculature is normal. No acute osseous abnormality.                                  Assessment/Plan:      * Atypical chest pain  Could be musculoskeletal.  Less likely ACS  EKG with normal sinus rhythm and no acute ST- T wave changes  D-dimer elevated for which CTA chest was done - negative for pulmonary embolism  ACS ruled out by troponin x 2  Stable for discharge home with plan for outpatient stress test  Patient agreeable with this plan and is aware to contact her cardiologist tomorrow to schedule outpatient stress test     Instructions provided to follow up with primary care physician as outpatient. Patient verbalized understanding and is aware to contact primary care physician or return to ED if new or worsening symptoms.    VTE Risk Mitigation (From admission, onward)    None             Neftali Baez MD  Department of Hospital Medicine   [facility]

## 2019-11-20 ENCOUNTER — TELEPHONE (OUTPATIENT)
Dept: FAMILY MEDICINE | Facility: CLINIC | Age: 69
End: 2019-11-20

## 2019-11-20 NOTE — TELEPHONE ENCOUNTER
Spoke with pt and let her know she can be scheduled after stress test. Pt states she will call back next week and get scheduled.

## 2019-11-20 NOTE — TELEPHONE ENCOUNTER
----- Message from Adonis Vazquez sent at 11/20/2019  2:38 PM CST -----  Pt was in the er [facility] pt went in for back and shoulder blade pain that went to the breast. Pt is having a stress test next week.   Needing er f/u appt   Pt [phone]

## 2019-11-27 DIAGNOSIS — F32.A DEPRESSION, UNSPECIFIED DEPRESSION TYPE: Primary | ICD-10-CM

## 2019-11-27 RX ORDER — SERTRALINE HYDROCHLORIDE 50 MG/1
50 TABLET, FILM COATED ORAL DAILY
Qty: 30 TABLET | Refills: 5 | Status: SHIPPED | OUTPATIENT
Start: 2019-11-27 | End: 2019-12-27

## 2019-11-27 NOTE — TELEPHONE ENCOUNTER
----- Message from Adonis Vazquez sent at 11/27/2019 10:45 AM CST -----  Refills on zoloft   amisha jeny [place]   Pt [phone]

## 2019-12-06 ENCOUNTER — TELEPHONE (OUTPATIENT)
Dept: FAMILY MEDICINE | Facility: CLINIC | Age: 69
End: 2019-12-06

## 2019-12-06 NOTE — TELEPHONE ENCOUNTER
----- Message from Adonis Vazquez sent at 12/6/2019  9:55 AM CST -----  Pt is needing f.u from urgent care with bronchitis and sinusitis   Pt [phone]

## 2019-12-10 ENCOUNTER — OFFICE VISIT (OUTPATIENT)
Dept: FAMILY MEDICINE | Facility: CLINIC | Age: 69
End: 2019-12-10
Payer: MEDICARE

## 2019-12-10 VITALS
BODY MASS INDEX: 21.16 KG/M2 | DIASTOLIC BLOOD PRESSURE: 64 MMHG | SYSTOLIC BLOOD PRESSURE: 106 MMHG | HEIGHT: 62 IN | HEART RATE: 76 BPM | WEIGHT: 115 LBS

## 2019-12-10 DIAGNOSIS — F32.5 MAJOR DEPRESSIVE DISORDER WITH SINGLE EPISODE, IN REMISSION: ICD-10-CM

## 2019-12-10 DIAGNOSIS — J20.9 ACUTE BRONCHITIS, UNSPECIFIED ORGANISM: Primary | ICD-10-CM

## 2019-12-10 DIAGNOSIS — Z23 NEED FOR PNEUMOCOCCAL VACCINATION: ICD-10-CM

## 2019-12-10 PROCEDURE — 90732 PPSV23 VACC 2 YRS+ SUBQ/IM: CPT | Mod: S$GLB,,, | Performed by: NURSE PRACTITIONER

## 2019-12-10 PROCEDURE — G0009 PNEUMOCOCCAL POLYSACCHARIDE VACCINE 23-VALENT =>2YO SQ IM: ICD-10-PCS | Mod: S$GLB,,, | Performed by: NURSE PRACTITIONER

## 2019-12-10 PROCEDURE — 90732 PNEUMOCOCCAL POLYSACCHARIDE VACCINE 23-VALENT =>2YO SQ IM: ICD-10-PCS | Mod: S$GLB,,, | Performed by: NURSE PRACTITIONER

## 2019-12-10 PROCEDURE — 99213 PR OFFICE/OUTPT VISIT, EST, LEVL III, 20-29 MIN: ICD-10-PCS | Mod: 25,S$GLB,, | Performed by: NURSE PRACTITIONER

## 2019-12-10 PROCEDURE — 1159F PR MEDICATION LIST DOCUMENTED IN MEDICAL RECORD: ICD-10-PCS | Mod: S$GLB,,, | Performed by: NURSE PRACTITIONER

## 2019-12-10 PROCEDURE — 1101F PT FALLS ASSESS-DOCD LE1/YR: CPT | Mod: S$GLB,,, | Performed by: NURSE PRACTITIONER

## 2019-12-10 PROCEDURE — G0009 ADMIN PNEUMOCOCCAL VACCINE: HCPCS | Mod: S$GLB,,, | Performed by: NURSE PRACTITIONER

## 2019-12-10 PROCEDURE — 1159F MED LIST DOCD IN RCRD: CPT | Mod: S$GLB,,, | Performed by: NURSE PRACTITIONER

## 2019-12-10 PROCEDURE — 1101F PR PT FALLS ASSESS DOC 0-1 FALLS W/OUT INJ PAST YR: ICD-10-PCS | Mod: S$GLB,,, | Performed by: NURSE PRACTITIONER

## 2019-12-10 PROCEDURE — 99213 OFFICE O/P EST LOW 20 MIN: CPT | Mod: 25,S$GLB,, | Performed by: NURSE PRACTITIONER

## 2019-12-10 RX ORDER — ASCORBATE CALCIUM 500 MG
TABLET ORAL
COMMUNITY

## 2019-12-10 RX ORDER — MAGNESIUM 200 MG
TABLET ORAL
COMMUNITY

## 2019-12-10 RX ORDER — SERTRALINE HYDROCHLORIDE 25 MG/1
25 TABLET, FILM COATED ORAL DAILY
Qty: 30 TABLET | Refills: 1 | Status: SHIPPED | OUTPATIENT
Start: 2019-12-10 | End: 2020-03-30 | Stop reason: SDUPTHER

## 2019-12-10 RX ORDER — CODEINE PHOSPHATE AND GUAIFENESIN 10; 100 MG/5ML; MG/5ML
5 SOLUTION ORAL 3 TIMES DAILY PRN
Qty: 118 ML | Refills: 0 | Status: SHIPPED | OUTPATIENT
Start: 2019-12-10 | End: 2019-12-20

## 2019-12-10 RX ORDER — PROMETHAZINE HYDROCHLORIDE AND DEXTROMETHORPHAN HYDROBROMIDE 6.25; 15 MG/5ML; MG/5ML
SYRUP ORAL
COMMUNITY
Start: 2019-12-03 | End: 2020-10-13

## 2019-12-10 RX ORDER — CEFDINIR 300 MG/1
CAPSULE ORAL
COMMUNITY
Start: 2019-12-03 | End: 2020-04-20

## 2019-12-10 RX ORDER — BENZONATATE 100 MG/1
CAPSULE ORAL
COMMUNITY
Start: 2019-12-03 | End: 2020-04-20

## 2019-12-10 NOTE — PATIENT INSTRUCTIONS
Weaning from Zoloft:    25mg Tablets for 3 weeks  1/2 tablet (12.5mg) for 2 weeks  1/2 tablets every other day until ready to stop.    If unable to tolerate, feel free to resume previous dose of 50mg.

## 2019-12-10 NOTE — PROGRESS NOTES
SUBJECTIVE:    Patient ID: Sylvia Maxwell is a 69 y.o. female.    Chief Complaint: Follow-up (Urgent Care, ER// SW)    Presents after visit to urgent care on 12/3 where she was diagnosed with bronchitis/sinusitis. Given antibiotic injection and steroid injection, as well as prescription for Omnicef, tessalon, and promethazine-dextromethorphan cough medicine. She did not take the cough medicine out of concern for interaction with her Sertraline. She reports feeling better but is still dealing with nagging cough. Denies fever or productive cough. She would also like to discuss weaning off of sertraline. Has been on it for several years and the stressors that led to using it are no longer an issue.       Admission on 11/18/2019, Discharged on 11/18/2019   Component Date Value Ref Range Status    Troponin I 11/18/2019 <0.030  0.020 - 0.040 ng/mL Final    Benzodiazepines 11/18/2019 Negative   Final    Cocaine (Metab.) 11/18/2019 Negative   Final    Opiate Scrn, Ur 11/18/2019 Negative   Final    Barbiturate Screen, Ur 11/18/2019 Negative   Final    Amphetamine Screen, Ur 11/18/2019 Negative   Final    THC 11/18/2019 Negative   Final    Phencyclidine 11/18/2019 Negative   Final    Creatinine, Random Ur 11/18/2019 21.0  15.0 - 325.0 mg/dL Final    Toxicology Information 11/18/2019 SEE COMMENT   Final    Specimen UA 11/18/2019 Urine, Clean Catch   Final    Color, UA 11/18/2019 Colorless* Yellow, Straw, Nano Final    Appearance, UA 11/18/2019 Clear  Clear Final    pH, UA 11/18/2019 8.0  5.0 - 8.0 Final    Specific [place], UA 11/18/2019 1.000* 1.005 - 1.030 Final    Protein, UA 11/18/2019 Negative  Negative Final    Glucose, UA 11/18/2019 Negative  Negative Final    Ketones, UA 11/18/2019 Trace* Negative Final    Bilirubin (UA) 11/18/2019 Negative  Negative Final    Occult Blood UA 11/18/2019 Negative  Negative Final    Nitrite, UA 11/18/2019 Negative  Negative Final    Urobilinogen, UA 11/18/2019  Negative  Negative EU/dL Final    Leukocytes, UA 11/18/2019 Negative  Negative Final    CPK 11/18/2019 77  20 - 180 U/L Final    Sodium 11/18/2019 135* 136 - 145 mmol/L Final    Potassium 11/18/2019 4.0  3.5 - 5.1 mmol/L Final    Chloride 11/18/2019 98  95 - 110 mmol/L Final    CO2 11/18/2019 28  23 - 29 mmol/L Final    Glucose 11/18/2019 93  70 - 110 mg/dL Final    BUN, Bld 11/18/2019 14  8 - 23 mg/dL Final    Creatinine 11/18/2019 0.5  0.5 - 1.4 mg/dL Final    Calcium 11/18/2019 9.4  8.7 - 10.5 mg/dL Final    Total Protein 11/18/2019 7.3  6.0 - 8.4 g/dL Final    Albumin 11/18/2019 4.4  3.5 - 5.2 g/dL Final    Total Bilirubin 11/18/2019 1.2* 0.1 - 1.0 mg/dL Final    Alkaline Phosphatase 11/18/2019 59  55 - 135 U/L Final    AST 11/18/2019 25  10 - 40 U/L Final    ALT 11/18/2019 20  10 - 44 U/L Final    Anion Gap 11/18/2019 9  8 - 16 mmol/L Final    eGFR if African American 11/18/2019 >60.0  >60 mL/min/1.73 m^2 Final    eGFR if non African American 11/18/2019 >60.0  >60 mL/min/1.73 m^2 Final    CPK MB 11/18/2019 1.8  0.1 - 6.5 ng/mL Final    WBC 11/18/2019 6.24  3.90 - 12.70 K/uL Final    RBC 11/18/2019 3.94* 4.00 - 5.40 M/uL Final    Hemoglobin 11/18/2019 12.5  12.0 - 16.0 g/dL Final    Hematocrit 11/18/2019 37.2  37.0 - 48.5 % Final    Mean Corpuscular Volume 11/18/2019 94  82 - 98 fL Final    Mean Corpuscular Hemoglobin 11/18/2019 31.7* 27.0 - 31.0 pg Final    Mean Corpuscular Hemoglobin Conc 11/18/2019 33.6  32.0 - 36.0 g/dL Final    RDW 11/18/2019 12.7  11.5 - 14.5 % Final    Platelets 11/18/2019 198  150 - 350 K/uL Final    MPV 11/18/2019 11.7  9.2 - 12.9 fL Final    Immature Granulocytes 11/18/2019 0.3  0.0 - 0.5 % Final    Gran # (ANC) 11/18/2019 4.3  1.8 - 7.7 K/uL Final    Immature Grans (Abs) 11/18/2019 0.02  0.00 - 0.04 K/uL Final    Lymph # 11/18/2019 1.3  1.0 - 4.8 K/uL Final    Mono # 11/18/2019 0.6  0.3 - 1.0 K/uL Final    Eos # 11/18/2019 0.0  0.0 - 0.5 K/uL Final     Baso # 11/18/2019 0.09  0.00 - 0.20 K/uL Final    nRBC 11/18/2019 0  0 /100 WBC Final    Gran% 11/18/2019 68.2  38.0 - 73.0 % Final    Lymph% 11/18/2019 20.4  18.0 - 48.0 % Final    Mono% 11/18/2019 9.5  4.0 - 15.0 % Final    Eosinophil% 11/18/2019 0.2  0.0 - 8.0 % Final    Basophil% 11/18/2019 1.4  0.0 - 1.9 % Final    Differential Method 11/18/2019 Automated   Final    aPTT 11/18/2019 32.3  23.6 - 33.3 sec Final    PT 11/18/2019 13.1  10.6 - 14.8 sec Final    INR 11/18/2019 1.0   Final    Amylase 11/18/2019 38  20 - 110 U/L Final    Lipase 11/18/2019 32  4 - 60 U/L Final    Magnesium 11/18/2019 2.3  1.6 - 2.6 mg/dL Final    D-Dimer 11/18/2019 1.33* <0.50 ug/mL FEU Final    BNP 11/18/2019 133* 0 - 99 pg/mL Final    Troponin I 11/18/2019 <0.030  0.020 - 0.040 ng/mL Final   Lab Visit on 09/04/2019   Component Date Value Ref Range Status    Cholesterol 09/04/2019 218* 120 - 199 mg/dL Final    Triglycerides 09/04/2019 90  30 - 150 mg/dL Final    HDL 09/04/2019 72  40 - 75 mg/dL Final    LDL Cholesterol 09/04/2019 128.0  63.0 - 159.0 mg/dL Final    Hdl/Cholesterol Ratio 09/04/2019 33.0  20.0 - 50.0 % Final    Total Cholesterol/HDL Ratio 09/04/2019 3.0  2.0 - 5.0 Final    Non-HDL Cholesterol 09/04/2019 146  mg/dL Final       History reviewed. No pertinent past medical history.  Past Surgical History:   Procedure Laterality Date    BREAST CYST ASPIRATION Left     left wrist      arthritis    TUBAL LIGATION       Family History   Problem Relation Age of Onset    Eczema Neg Hx     Lupus Neg Hx     Psoriasis Neg Hx     Melanoma Neg Hx        Marital Status:   Alcohol History:  reports that she drinks alcohol.  Tobacco History:  reports that she has quit smoking. She has never used smokeless tobacco.  Drug History:  reports that she does not use drugs.    Review of patient's allergies indicates:  No Known Allergies    Current Outpatient Medications:     ascorbate calcium, vitamin C,  "500 mg Tab, Vitamin C 500 mg tablet  Take 1 tablet every day by oral route., Disp: , Rfl:     benzonatate (TESSALON) 100 MG capsule, , Disp: , Rfl:     calcium-vitamin D3 (CALCIUM 500 + D) 500 mg(1,250mg) -200 unit per tablet, Take 1 tablet by mouth 2 (two) times daily with meals., Disp: , Rfl:     cefdinir (OMNICEF) 300 MG capsule, , Disp: , Rfl:     flu vac ts 2016-17,4 yr up,/PF (FLU VAC TS 2014-15,36MOS+,,PF,) 45 mcg (15 mcg x 3)/0.5 mL, Fluzone 1717-7370(PF) 45 mcg (15 mcg x 3)/0.5 mL intramuscular syringe, Disp: , Rfl:     magnesium 200 mg Tab, magnesium, Disp: , Rfl:     multivit-min-iron-FA-K.gin (CENTRUM PERFORMANCE) 18 mg iron-400 mcg-50 mg Tab, Take 1 tablet by mouth once daily. , Disp: , Rfl:     promethazine-dextromethorphan (PROMETHAZINE-DM) 6.25-15 mg/5 mL Syrp, , Disp: , Rfl:     sertraline (ZOLOFT) 50 MG tablet, Take 1 tablet (50 mg total) by mouth once daily., Disp: 30 tablet, Rfl: 5    guaifenesin-codeine 100-10 mg/5 ml (TUSSI-ORGANIDIN NR)  mg/5 mL syrup, Take 5 mLs by mouth 3 (three) times daily as needed for Cough., Disp: 118 mL, Rfl: 0    sertraline (ZOLOFT) 25 MG tablet, Take 1 tablet (25 mg total) by mouth once daily., Disp: 30 tablet, Rfl: 1    Review of Systems   Constitutional: Negative for chills and fever.   HENT: Negative for congestion, ear pain, sinus pressure, sinus pain and sore throat.    Eyes: Negative for pain and redness.   Respiratory: Positive for cough. Negative for chest tightness, shortness of breath and wheezing.    Cardiovascular: Negative for chest pain.   Gastrointestinal: Negative for nausea and vomiting.   Musculoskeletal: Negative for myalgias and neck pain.   Neurological: Negative for dizziness, weakness and headaches.          Objective:      Vitals:    12/10/19 1512   BP: 106/64   Pulse: 76   Weight: 52.2 kg (115 lb)   Height: 5' 2" (1.575 m)     Body mass index is 21.03 kg/m².  Physical Exam   Constitutional: She is oriented to person, place, " and time. She appears well-developed and well-nourished.   HENT:   Head: Normocephalic.   Nose: Nose normal.   Eyes: Pupils are equal, round, and reactive to light.   Neck: Normal range of motion.   Cardiovascular: Normal rate and regular rhythm.   Pulmonary/Chest: Effort normal and breath sounds normal. She has no wheezes.   Lymphadenopathy:     She has no cervical adenopathy.   Neurological: She is alert and oriented to person, place, and time.   Skin: Skin is warm and dry.         Assessment:       1. Acute bronchitis, unspecified organism    2. Major depressive disorder with single episode, in remission    3. Need for pneumococcal vaccination         Plan:       Acute bronchitis, unspecified organism  -     guaifenesin-codeine 100-10 mg/5 ml (TUSSI-ORGANIDIN NR)  mg/5 mL syrup; Take 5 mLs by mouth 3 (three) times daily as needed for Cough.  Dispense: 118 mL; Refill: 0  - Symptoms much better. Finished Omnicef. Cough medicine prn.    Major depressive disorder with single episode, in remission  -     sertraline (ZOLOFT) 25 MG tablet; Take 1 tablet (25 mg total) by mouth once daily.  Dispense: 30 tablet; Refill: 1  - Weaning schedule given to patient. 25mg daily for 3 weeks, then 12.5mg daily for 2 weeks, then 12.5mg every other day until ready to stop.  - Restart 50mg dose if unable to tolerate weaning.    Need for pneumococcal vaccination  -     Pneumococcal Polysaccharide Vaccine (23 Valent) (SQ/IM)  - Given today      Follow up if symptoms worsen or fail to improve.

## 2020-01-21 ENCOUNTER — OFFICE VISIT (OUTPATIENT)
Dept: FAMILY MEDICINE | Facility: CLINIC | Age: 70
End: 2020-01-21
Payer: MEDICARE

## 2020-01-21 ENCOUNTER — TELEPHONE (OUTPATIENT)
Dept: FAMILY MEDICINE | Facility: CLINIC | Age: 70
End: 2020-01-21

## 2020-01-21 VITALS
WEIGHT: 117 LBS | DIASTOLIC BLOOD PRESSURE: 64 MMHG | SYSTOLIC BLOOD PRESSURE: 108 MMHG | HEART RATE: 76 BPM | BODY MASS INDEX: 21.53 KG/M2 | HEIGHT: 62 IN

## 2020-01-21 DIAGNOSIS — M79.642 PAIN IN BOTH HANDS: ICD-10-CM

## 2020-01-21 DIAGNOSIS — J30.1 ALLERGIC RHINITIS DUE TO POLLEN, UNSPECIFIED SEASONALITY: ICD-10-CM

## 2020-01-21 DIAGNOSIS — M79.641 PAIN IN BOTH HANDS: ICD-10-CM

## 2020-01-21 DIAGNOSIS — M54.9 BACK PAIN, UNSPECIFIED BACK LOCATION, UNSPECIFIED BACK PAIN LATERALITY, UNSPECIFIED CHRONICITY: ICD-10-CM

## 2020-01-21 DIAGNOSIS — M25.50 ARTHRALGIA, UNSPECIFIED JOINT: ICD-10-CM

## 2020-01-21 DIAGNOSIS — R05.9 COUGH: Primary | ICD-10-CM

## 2020-01-21 PROCEDURE — 1125F PR PAIN SEVERITY QUANTIFIED, PAIN PRESENT: ICD-10-PCS | Mod: S$GLB,,, | Performed by: NURSE PRACTITIONER

## 2020-01-21 PROCEDURE — 99214 PR OFFICE/OUTPT VISIT, EST, LEVL IV, 30-39 MIN: ICD-10-PCS | Mod: S$GLB,,, | Performed by: NURSE PRACTITIONER

## 2020-01-21 PROCEDURE — 1159F MED LIST DOCD IN RCRD: CPT | Mod: S$GLB,,, | Performed by: NURSE PRACTITIONER

## 2020-01-21 PROCEDURE — 1125F AMNT PAIN NOTED PAIN PRSNT: CPT | Mod: S$GLB,,, | Performed by: NURSE PRACTITIONER

## 2020-01-21 PROCEDURE — 1101F PT FALLS ASSESS-DOCD LE1/YR: CPT | Mod: S$GLB,,, | Performed by: NURSE PRACTITIONER

## 2020-01-21 PROCEDURE — 1101F PR PT FALLS ASSESS DOC 0-1 FALLS W/OUT INJ PAST YR: ICD-10-PCS | Mod: S$GLB,,, | Performed by: NURSE PRACTITIONER

## 2020-01-21 PROCEDURE — 99214 OFFICE O/P EST MOD 30 MIN: CPT | Mod: S$GLB,,, | Performed by: NURSE PRACTITIONER

## 2020-01-21 PROCEDURE — 1159F PR MEDICATION LIST DOCUMENTED IN MEDICAL RECORD: ICD-10-PCS | Mod: S$GLB,,, | Performed by: NURSE PRACTITIONER

## 2020-01-21 RX ORDER — METHYLPREDNISOLONE 4 MG/1
TABLET ORAL
Qty: 1 PACKAGE | Refills: 0 | Status: SHIPPED | OUTPATIENT
Start: 2020-01-21 | End: 2020-02-11

## 2020-01-21 RX ORDER — TIZANIDINE 4 MG/1
4 TABLET ORAL EVERY 8 HOURS
Qty: 30 TABLET | Refills: 0 | Status: SHIPPED | OUTPATIENT
Start: 2020-01-21 | End: 2020-01-31

## 2020-01-21 RX ORDER — LEVOCETIRIZINE DIHYDROCHLORIDE 5 MG/1
5 TABLET, FILM COATED ORAL NIGHTLY
Qty: 30 TABLET | Refills: 11 | Status: SHIPPED | OUTPATIENT
Start: 2020-01-21 | End: 2020-10-13

## 2020-01-21 NOTE — PROGRESS NOTES
SUBJECTIVE:    Patient ID: Sylvia Maxwell is a 69 y.o. female.    Chief Complaint: Hand Pain (numbness in fingers, will req C-scope Debbie// SW); Hip Pain (for a while// SW); and Back Pain (lower back, no bottles// SW)    69-year-old female presents with complaints of bilateral hand pain.  Reports that had a surgery several years ago and since has intermittent pain.  Seems to be getting worse.  Pain is constant.  However it does increase with movement.  Also has been having lower back pain.  Radiates down into the knees.  No loss of bowel or bladder function.  Was previously seen a chiropractor which did seem to help for some time however quit seeing a chiropractor about 3 months ago and symptoms have returned.      Office Visit on 01/21/2020   Component Date Value Ref Range Status    Rheumatoid Factor 01/21/2020 <14  <14 IU/mL Final    Sed Rate 01/21/2020 9  < OR = 30 mm/h Final   Admission on 11/18/2019, Discharged on 11/18/2019   Component Date Value Ref Range Status    Troponin I 11/18/2019 <0.030  0.020 - 0.040 ng/mL Final    Benzodiazepines 11/18/2019 Negative   Final    Cocaine (Metab.) 11/18/2019 Negative   Final    Opiate Scrn, Ur 11/18/2019 Negative   Final    Barbiturate Screen, Ur 11/18/2019 Negative   Final    Amphetamine Screen, Ur 11/18/2019 Negative   Final    THC 11/18/2019 Negative   Final    Phencyclidine 11/18/2019 Negative   Final    Creatinine, Random Ur 11/18/2019 21.0  15.0 - 325.0 mg/dL Final    Toxicology Information 11/18/2019 SEE COMMENT   Final    Specimen UA 11/18/2019 Urine, Clean Catch   Final    Color, UA 11/18/2019 Colorless* Yellow, Straw, Nano Final    Appearance, UA 11/18/2019 Clear  Clear Final    pH, UA 11/18/2019 8.0  5.0 - 8.0 Final    Specific [place], UA 11/18/2019 1.000* 1.005 - 1.030 Final    Protein, UA 11/18/2019 Negative  Negative Final    Glucose, UA 11/18/2019 Negative  Negative Final    Ketones, UA 11/18/2019 Trace* Negative Final     Bilirubin (UA) 11/18/2019 Negative  Negative Final    Occult Blood UA 11/18/2019 Negative  Negative Final    Nitrite, UA 11/18/2019 Negative  Negative Final    Urobilinogen, UA 11/18/2019 Negative  Negative EU/dL Final    Leukocytes, UA 11/18/2019 Negative  Negative Final    CPK 11/18/2019 77  20 - 180 U/L Final    Sodium 11/18/2019 135* 136 - 145 mmol/L Final    Potassium 11/18/2019 4.0  3.5 - 5.1 mmol/L Final    Chloride 11/18/2019 98  95 - 110 mmol/L Final    CO2 11/18/2019 28  23 - 29 mmol/L Final    Glucose 11/18/2019 93  70 - 110 mg/dL Final    BUN, Bld 11/18/2019 14  8 - 23 mg/dL Final    Creatinine 11/18/2019 0.5  0.5 - 1.4 mg/dL Final    Calcium 11/18/2019 9.4  8.7 - 10.5 mg/dL Final    Total Protein 11/18/2019 7.3  6.0 - 8.4 g/dL Final    Albumin 11/18/2019 4.4  3.5 - 5.2 g/dL Final    Total Bilirubin 11/18/2019 1.2* 0.1 - 1.0 mg/dL Final    Alkaline Phosphatase 11/18/2019 59  55 - 135 U/L Final    AST 11/18/2019 25  10 - 40 U/L Final    ALT 11/18/2019 20  10 - 44 U/L Final    Anion Gap 11/18/2019 9  8 - 16 mmol/L Final    eGFR if African American 11/18/2019 >60.0  >60 mL/min/1.73 m^2 Final    eGFR if non African American 11/18/2019 >60.0  >60 mL/min/1.73 m^2 Final    CPK MB 11/18/2019 1.8  0.1 - 6.5 ng/mL Final    WBC 11/18/2019 6.24  3.90 - 12.70 K/uL Final    RBC 11/18/2019 3.94* 4.00 - 5.40 M/uL Final    Hemoglobin 11/18/2019 12.5  12.0 - 16.0 g/dL Final    Hematocrit 11/18/2019 37.2  37.0 - 48.5 % Final    Mean Corpuscular Volume 11/18/2019 94  82 - 98 fL Final    Mean Corpuscular Hemoglobin 11/18/2019 31.7* 27.0 - 31.0 pg Final    Mean Corpuscular Hemoglobin Conc 11/18/2019 33.6  32.0 - 36.0 g/dL Final    RDW 11/18/2019 12.7  11.5 - 14.5 % Final    Platelets 11/18/2019 198  150 - 350 K/uL Final    MPV 11/18/2019 11.7  9.2 - 12.9 fL Final    Immature Granulocytes 11/18/2019 0.3  0.0 - 0.5 % Final    Gran # (ANC) 11/18/2019 4.3  1.8 - 7.7 K/uL Final    Immature Grans  (Abs) 11/18/2019 0.02  0.00 - 0.04 K/uL Final    Lymph # 11/18/2019 1.3  1.0 - 4.8 K/uL Final    Mono # 11/18/2019 0.6  0.3 - 1.0 K/uL Final    Eos # 11/18/2019 0.0  0.0 - 0.5 K/uL Final    Baso # 11/18/2019 0.09  0.00 - 0.20 K/uL Final    nRBC 11/18/2019 0  0 /100 WBC Final    Gran% 11/18/2019 68.2  38.0 - 73.0 % Final    Lymph% 11/18/2019 20.4  18.0 - 48.0 % Final    Mono% 11/18/2019 9.5  4.0 - 15.0 % Final    Eosinophil% 11/18/2019 0.2  0.0 - 8.0 % Final    Basophil% 11/18/2019 1.4  0.0 - 1.9 % Final    Differential Method 11/18/2019 Automated   Final    aPTT 11/18/2019 32.3  23.6 - 33.3 sec Final    PT 11/18/2019 13.1  10.6 - 14.8 sec Final    INR 11/18/2019 1.0   Final    Amylase 11/18/2019 38  20 - 110 U/L Final    Lipase 11/18/2019 32  4 - 60 U/L Final    Magnesium 11/18/2019 2.3  1.6 - 2.6 mg/dL Final    D-Dimer 11/18/2019 1.33* <0.50 ug/mL FEU Final    BNP 11/18/2019 133* 0 - 99 pg/mL Final    Troponin I 11/18/2019 <0.030  0.020 - 0.040 ng/mL Final   Lab Visit on 09/04/2019   Component Date Value Ref Range Status    Cholesterol 09/04/2019 218* 120 - 199 mg/dL Final    Triglycerides 09/04/2019 90  30 - 150 mg/dL Final    HDL 09/04/2019 72  40 - 75 mg/dL Final    LDL Cholesterol 09/04/2019 128.0  63.0 - 159.0 mg/dL Final    Hdl/Cholesterol Ratio 09/04/2019 33.0  20.0 - 50.0 % Final    Total Cholesterol/HDL Ratio 09/04/2019 3.0  2.0 - 5.0 Final    Non-HDL Cholesterol 09/04/2019 146  mg/dL Final       History reviewed. No pertinent past medical history.  Past Surgical History:   Procedure Laterality Date    BREAST CYST ASPIRATION Left     left wrist      arthritis    TUBAL LIGATION       Family History   Problem Relation Age of Onset    Eczema Neg Hx     Lupus Neg Hx     Psoriasis Neg Hx     Melanoma Neg Hx        Marital Status:   Alcohol History:  reports that she drinks alcohol.  Tobacco History:  reports that she has quit smoking. She has never used smokeless  tobacco.  Drug History:  reports that she does not use drugs.    Review of patient's allergies indicates:  No Known Allergies    Current Outpatient Medications:     ascorbate calcium, vitamin C, 500 mg Tab, Vitamin C 500 mg tablet  Take 1 tablet every day by oral route., Disp: , Rfl:     benzonatate (TESSALON) 100 MG capsule, , Disp: , Rfl:     calcium-vitamin D3 (CALCIUM 500 + D) 500 mg(1,250mg) -200 unit per tablet, Take 1 tablet by mouth 2 (two) times daily with meals., Disp: , Rfl:     cefdinir (OMNICEF) 300 MG capsule, , Disp: , Rfl:     flu vac ts 2016-17,4 yr up,/PF (FLU VAC TS 2014-15,36MOS+,,PF,) 45 mcg (15 mcg x 3)/0.5 mL, Fluzone 9051-4407(PF) 45 mcg (15 mcg x 3)/0.5 mL intramuscular syringe, Disp: , Rfl:     levocetirizine (XYZAL) 5 MG tablet, Take 1 tablet (5 mg total) by mouth every evening., Disp: 30 tablet, Rfl: 11    magnesium 200 mg Tab, magnesium, Disp: , Rfl:     methylPREDNISolone (MEDROL DOSEPACK) 4 mg tablet, use as directed, Disp: 1 Package, Rfl: 0    multivit-min-iron-FA-K.gin (CENTRUM PERFORMANCE) 18 mg iron-400 mcg-50 mg Tab, Take 1 tablet by mouth once daily. , Disp: , Rfl:     mupirocin calcium 2% nasl oint (BACTROBAN) 2 % Oint, by Nasal route 2 (two) times daily. for 10 days, Disp: 1 g, Rfl: 0    promethazine-dextromethorphan (PROMETHAZINE-DM) 6.25-15 mg/5 mL Syrp, , Disp: , Rfl:     sertraline (ZOLOFT) 25 MG tablet, Take 1 tablet (25 mg total) by mouth once daily., Disp: 30 tablet, Rfl: 1    tiZANidine (ZANAFLEX) 4 MG tablet, Take 1 tablet (4 mg total) by mouth every 8 (eight) hours. for 10 days, Disp: 30 tablet, Rfl: 0    Review of Systems   Constitutional: Negative for chills.   HENT: Positive for sinus pressure and sneezing. Negative for rhinorrhea and sore throat.    Respiratory: Negative for cough and shortness of breath.    Cardiovascular: Negative for chest pain, palpitations and leg swelling.   Gastrointestinal: Negative for abdominal pain, diarrhea, nausea and  "vomiting.   Genitourinary: Negative for difficulty urinating, hematuria and vaginal bleeding.   Musculoskeletal: Positive for back pain. Negative for arthralgias.        Bilateral hand pain   Skin: Negative for rash.   Neurological: Negative for dizziness, weakness and headaches.   Psychiatric/Behavioral: Negative for agitation and sleep disturbance. The patient is not nervous/anxious.           Objective:      Vitals:    01/21/20 1352   BP: 108/64   Pulse: 76   Weight: 53.1 kg (117 lb)   Height: 5' 2" (1.575 m)     Body mass index is 21.4 kg/m².  Physical Exam   Constitutional: She is oriented to person, place, and time. She appears well-developed and well-nourished.   HENT:   Right Ear: External ear normal.   Left Ear: External ear normal.   Nose: Rhinorrhea present. Right sinus exhibits no frontal sinus tenderness. Left sinus exhibits no frontal sinus tenderness.   Mouth/Throat: Oropharynx is clear and moist.   Neck: Normal range of motion. Neck supple. No JVD present.   Cardiovascular: Normal rate and regular rhythm.   No murmur heard.  Pulmonary/Chest: Effort normal and breath sounds normal.   Abdominal: Soft. Bowel sounds are normal.   Musculoskeletal: She exhibits no edema.        Lumbar back: She exhibits decreased range of motion and tenderness. She exhibits no swelling and no deformity.        Right hand: She exhibits tenderness. She exhibits no deformity.        Left hand: She exhibits decreased range of motion and deformity.   Bilateral negative straight leg     Lymphadenopathy:     She has no cervical adenopathy.   Neurological: She is alert and oriented to person, place, and time. Gait normal.   Skin: Skin is warm, dry and intact. No rash noted.   Psychiatric: She has a normal mood and affect. Her speech is normal and behavior is normal.         Assessment:       1. Cough    2. Arthralgia, unspecified joint    3. Back pain, unspecified back location, unspecified back pain laterality, unspecified " chronicity    4. Pain in both hands    5. Allergic rhinitis due to pollen, unspecified seasonality         Plan:       Cough  -     X-Ray Chest PA And Lateral; Future; Expected date: 01/21/2020    Arthralgia, unspecified joint  -     Cancel: ANSLEY Screen w/Reflex; Future; Expected date: 01/21/2020  -     Cancel: Rheumatoid factor; Future; Expected date: 01/21/2020  -     Cancel: Sedimentation rate; Future; Expected date: 01/21/2020  -     X-Ray Lumbar Spine Ap And Lateral; Future  -     Sedimentation rate; Future; Expected date: 01/21/2020  -     Rheumatoid factor; Future; Expected date: 01/21/2020  -     ANSLEY Screen w/Reflex; Future; Expected date: 01/21/2020    Back pain, unspecified back location, unspecified back pain laterality, unspecified chronicity  -     X-Ray Lumbar Spine Ap And Lateral; Future  -     methylPREDNISolone (MEDROL DOSEPACK) 4 mg tablet; use as directed  Dispense: 1 Package; Refill: 0  -     tiZANidine (ZANAFLEX) 4 MG tablet; Take 1 tablet (4 mg total) by mouth every 8 (eight) hours. for 10 days  Dispense: 30 tablet; Refill: 0    Pain in both hands    Allergic rhinitis due to pollen, unspecified seasonality  -     levocetirizine (XYZAL) 5 MG tablet; Take 1 tablet (5 mg total) by mouth every evening.  Dispense: 30 tablet; Refill: 11    Other orders  -     mupirocin calcium 2% nasl oint (BACTROBAN) 2 % Oint; by Nasal route 2 (two) times daily. for 10 days  Dispense: 1 g; Refill: 0  -     Sedimentation rate, automated      Follow up in about 3 months (around 4/21/2020).

## 2020-01-21 NOTE — TELEPHONE ENCOUNTER
Pharmacy is calling to get a verbal on bactroban, asking if they can dispense the regular bactroban rather than the specific nasal ointment because it is more expensive. Verbal given.

## 2020-01-22 ENCOUNTER — TELEPHONE (OUTPATIENT)
Dept: FAMILY MEDICINE | Facility: CLINIC | Age: 70
End: 2020-01-22

## 2020-01-22 DIAGNOSIS — M25.50 ARTHRALGIA, UNSPECIFIED JOINT: Primary | ICD-10-CM

## 2020-01-22 NOTE — TELEPHONE ENCOUNTER
----- Message from Otoniel Bosch sent at 1/22/2020 10:43 AM CST -----  Contact: Oly Maxwell  Pt would like to know if x-ray older of lumbar spine  includes the hip and also she would like to know if she needs to make an appt for a hands specialist of is she waiting for lab results  Pt# [phone]

## 2020-01-24 ENCOUNTER — HOSPITAL ENCOUNTER (OUTPATIENT)
Dept: RADIOLOGY | Facility: HOSPITAL | Age: 70
Discharge: HOME | End: 2020-01-24
Attending: NURSE PRACTITIONER
Payer: MEDICARE

## 2020-01-24 DIAGNOSIS — M25.50 ARTHRALGIA, UNSPECIFIED JOINT: ICD-10-CM

## 2020-01-24 DIAGNOSIS — M54.9 BACK PAIN, UNSPECIFIED BACK LOCATION, UNSPECIFIED BACK PAIN LATERALITY, UNSPECIFIED CHRONICITY: ICD-10-CM

## 2020-01-24 DIAGNOSIS — R05.9 COUGH: ICD-10-CM

## 2020-01-24 PROCEDURE — 73521 X-RAY EXAM HIPS BI 2 VIEWS: CPT | Mod: TC,PO

## 2020-01-24 PROCEDURE — 72100 X-RAY EXAM L-S SPINE 2/3 VWS: CPT | Mod: TC,PO

## 2020-01-24 PROCEDURE — 71046 X-RAY EXAM CHEST 2 VIEWS: CPT | Mod: TC,PO

## 2020-01-29 ENCOUNTER — PATIENT MESSAGE (OUTPATIENT)
Dept: FAMILY MEDICINE | Facility: CLINIC | Age: 70
End: 2020-01-29

## 2020-01-29 LAB
ANA SER QL IF: NEGATIVE
ERYTHROCYTE [SEDIMENTATION RATE] IN BLOOD BY WESTERGREN METHOD: 9 MM/H
RHEUMATOID FACT SERPL-ACNC: <14 IU/ML

## 2020-03-25 ENCOUNTER — TELEPHONE (OUTPATIENT)
Dept: FAMILY MEDICINE | Facility: CLINIC | Age: 70
End: 2020-03-25

## 2020-03-25 NOTE — TELEPHONE ENCOUNTER
----- Message from Apple Leigh MA sent at 3/25/2020  3:27 PM CDT -----  Pt called in reporting she was taking Zoloft 25mg, but had stopped taking it several months ago.  She is now requesting a new Rx due to increased anxiety with the active virus.  She is requesting 60 tablets.    Ph[facility] - [facility] [address]    Pt - [phone]

## 2020-03-30 ENCOUNTER — TELEPHONE (OUTPATIENT)
Dept: FAMILY MEDICINE | Facility: CLINIC | Age: 70
End: 2020-03-30

## 2020-03-30 DIAGNOSIS — F32.5 MAJOR DEPRESSIVE DISORDER WITH SINGLE EPISODE, IN REMISSION: ICD-10-CM

## 2020-03-30 RX ORDER — SERTRALINE HYDROCHLORIDE 25 MG/1
25 TABLET, FILM COATED ORAL DAILY
Qty: 30 TABLET | Refills: 1 | Status: SHIPPED | OUTPATIENT
Start: 2020-03-30 | End: 2020-05-27 | Stop reason: SDUPTHER

## 2020-03-30 NOTE — TELEPHONE ENCOUNTER
----- Message from Apple Leigh MA sent at 3/30/2020 10:52 AM CDT -----  Pt states she called in last week requesting a new Rx for Zoloft.  States she has not heard from anyone and does not believe Rx has been called in.  Advised pt that we did call to offer a virtual visit.  She stated if that is the only way to get the Rx, she will schedule a virtual visit.    Ph[facility] - [facility]    Pt - [phone]

## 2020-03-30 NOTE — TELEPHONE ENCOUNTER
Spoke with pt, she really doesn't want a vv at this time, has a regular check up scheduled the end of next month and wants to keep that. States in January they had discussed weaning off of it but with everything going on she decided to stay on it. W[facility] the rx to [facility]

## 2020-04-20 ENCOUNTER — TELEPHONE (OUTPATIENT)
Dept: FAMILY MEDICINE | Facility: CLINIC | Age: 70
End: 2020-04-20

## 2020-04-20 ENCOUNTER — OFFICE VISIT (OUTPATIENT)
Dept: FAMILY MEDICINE | Facility: CLINIC | Age: 70
End: 2020-04-20
Payer: MEDICARE

## 2020-04-20 DIAGNOSIS — F32.5 MAJOR DEPRESSIVE DISORDER WITH SINGLE EPISODE, IN REMISSION: ICD-10-CM

## 2020-04-20 DIAGNOSIS — F41.9 ANXIETY: ICD-10-CM

## 2020-04-20 DIAGNOSIS — E78.00 ELEVATED LDL CHOLESTEROL LEVEL: ICD-10-CM

## 2020-04-20 DIAGNOSIS — M54.12 CERVICAL RADICULOPATHY: ICD-10-CM

## 2020-04-20 DIAGNOSIS — M50.30 DDD (DEGENERATIVE DISC DISEASE), CERVICAL: Primary | ICD-10-CM

## 2020-04-20 DIAGNOSIS — Z79.899 HIGH RISK MEDICATION USE: ICD-10-CM

## 2020-04-20 PROCEDURE — 1101F PR PT FALLS ASSESS DOC 0-1 FALLS W/OUT INJ PAST YR: ICD-10-PCS | Mod: 95,,, | Performed by: NURSE PRACTITIONER

## 2020-04-20 PROCEDURE — 99213 PR OFFICE/OUTPT VISIT, EST, LEVL III, 20-29 MIN: ICD-10-PCS | Mod: 95,,, | Performed by: NURSE PRACTITIONER

## 2020-04-20 PROCEDURE — 99213 OFFICE O/P EST LOW 20 MIN: CPT | Mod: 95,,, | Performed by: NURSE PRACTITIONER

## 2020-04-20 PROCEDURE — 1159F MED LIST DOCD IN RCRD: CPT | Mod: 95,,, | Performed by: NURSE PRACTITIONER

## 2020-04-20 PROCEDURE — 1159F PR MEDICATION LIST DOCUMENTED IN MEDICAL RECORD: ICD-10-PCS | Mod: 95,,, | Performed by: NURSE PRACTITIONER

## 2020-04-20 PROCEDURE — 1101F PT FALLS ASSESS-DOCD LE1/YR: CPT | Mod: 95,,, | Performed by: NURSE PRACTITIONER

## 2020-04-20 NOTE — PROGRESS NOTES
Subjective:      The chief complaint leading to consultation is: check up  The patient location is:  Home  Visit type: Virtual visit with synchronous audio/video or audio only  This was a video visit in lieu of in-person visit due to the coronavirus emergency. Patient acknowledged and consented to the video visit encounter.     7-year-old female presents for virtual visit.  Overall doing okay.  Staying home.  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  Mother is currently homebound.  Taken sertraline daily.  Currently taking 25 mg.  Still feels stressed.  Neck and shoulders have been okay.  Not watching diet as much due to being home all the time.  Followed by Dr. Patton Last lipid panel was in September.  Sleeps okay.      Past Surgical History:   Procedure Laterality Date    BREAST CYST ASPIRATION Left     left wrist      arthritis    TUBAL LIGATION       History reviewed. No pertinent past medical history.  Family History   Problem Relation Age of Onset    Eczema Neg Hx     Lupus Neg Hx     Psoriasis Neg Hx     Melanoma Neg Hx         Social History:   Marital Status:   Alcohol History:  reports that she drinks alcohol.  Tobacco History:  reports that she has quit smoking. She has never used smokeless tobacco.  Drug History:  reports that she does not use drugs.    Review of patient's allergies indicates:  No Known Allergies    Current Outpatient Medications   Medication Sig Dispense Refill    ascorbate calcium, vitamin C, 500 mg Tab Vitamin C 500 mg tablet   Take 1 tablet every day by oral route.      calcium-vitamin D3 (CALCIUM 500 + D) 500 mg(1,250mg) -200 unit per tablet Take 1 tablet by mouth 2 (two) times daily with meals.      magnesium 200 mg Tab magnesium      multivit-min-iron-FA-K.gin (CENTRUM PERFORMANCE) 18 mg iron-400 mcg-50 mg Tab Take 1 tablet by mouth once daily.       sertraline (ZOLOFT) 25 MG tablet Take 1 tablet (25 mg total) by mouth once daily. 30 tablet 1    flu vac ts  2016-17,4 yr up,/PF (FLU VAC TS 2014-15,36MOS+,,PF,) 45 mcg (15 mcg x 3)/0.5 mL Fluzone 5440-2821(PF) 45 mcg (15 mcg x 3)/0.5 mL intramuscular syringe      levocetirizine (XYZAL) 5 MG tablet Take 1 tablet (5 mg total) by mouth every evening. 30 tablet 11    promethazine-dextromethorphan (PROMETHAZINE-DM) 6.25-15 mg/5 mL Syrp        No current facility-administered medications for this visit.        Review of Systems   Constitutional: Negative for chills, fever and unexpected weight change.   HENT: Negative for ear pain, rhinorrhea and sore throat.    Eyes: Negative for pain and visual disturbance.   Respiratory: Negative for cough and shortness of breath.    Cardiovascular: Negative for chest pain, palpitations and leg swelling.   Gastrointestinal: Negative for abdominal pain, diarrhea, nausea and vomiting.   Musculoskeletal: Negative for arthralgias.   Skin: Negative for rash.   Neurological: Negative for dizziness, weakness and headaches.   Psychiatric/Behavioral: Negative for agitation and sleep disturbance. The patient is nervous/anxious.          Objective:        Physical Exam:   Physical Exam   Constitutional: She appears well-developed and well-nourished. No distress.   Psychiatric: She has a normal mood and affect.            Assessment:       1. DDD (degenerative disc disease), cervical    2. Cervical radiculopathy    3. Bursitis/tendonitis, shoulder    4. Major depressive disorder with single episode, in remission    5. High risk medication use    6. Anxiety    7. Elevated LDL cholesterol level      Plan:   DDD (degenerative disc disease), cervical  -     Urinalysis, Reflex to Urine Culture Urine, Clean Catch; Future; Expected date: 04/20/2020    Cervical radiculopathy    Bursitis/tendonitis, shoulder    Major depressive disorder with single episode, in remission  Comments:  Increase Zoloft to 50 mg q.day. Call with efficacy.  Orders:  -     CBC auto differential; Future; Expected date:  04/20/2020    High risk medication use  -     CBC auto differential; Future; Expected date: 04/20/2020  -     Microalbumin/creatinine urine ratio; Future; Expected date: 04/20/2020    Anxiety  -     CBC auto differential; Future; Expected date: 04/20/2020  -     Comprehensive metabolic panel; Future; Expected date: 04/20/2020  -     TSH w/reflex to FT4; Future; Expected date: 04/20/2020  -     Urinalysis, Reflex to Urine Culture Urine, Clean Catch; Future; Expected date: 04/20/2020    Elevated LDL cholesterol level  -     Lipid panel; Future; Expected date: 04/20/2020  -     Urinalysis, Reflex to Urine Culture Urine, Clean Catch; Future; Expected date: 04/20/2020      Follow up in about 6 months (around 10/20/2020) for medication management.    Total time spent with patient: 15 min    Each patient to whom he or she provides medical services by telemedicine is:  (1) informed of the relationship between the physician and patient and the respective role of any other health care provider with respect to management of the patient; and (2) notified that he or she may decline to receive medical services by telemedicine and may withdraw from such care at any time.    This note was created using [employer] voice recognition software that occasionally misinterprets phrases or words.

## 2020-05-27 ENCOUNTER — PATIENT MESSAGE (OUTPATIENT)
Dept: FAMILY MEDICINE | Facility: CLINIC | Age: 70
End: 2020-05-27

## 2020-05-27 DIAGNOSIS — F32.5 MAJOR DEPRESSIVE DISORDER WITH SINGLE EPISODE, IN REMISSION: ICD-10-CM

## 2020-05-27 RX ORDER — SERTRALINE HYDROCHLORIDE 25 MG/1
25 TABLET, FILM COATED ORAL DAILY
Qty: 30 TABLET | Refills: 1 | Status: SHIPPED | OUTPATIENT
Start: 2020-05-27 | End: 2020-07-28 | Stop reason: SDUPTHER

## 2020-06-02 ENCOUNTER — PATIENT MESSAGE (OUTPATIENT)
Dept: FAMILY MEDICINE | Facility: CLINIC | Age: 70
End: 2020-06-02

## 2020-06-04 ENCOUNTER — OFFICE VISIT (OUTPATIENT)
Dept: FAMILY MEDICINE | Facility: CLINIC | Age: 70
End: 2020-06-04
Payer: MEDICARE

## 2020-06-04 ENCOUNTER — TELEPHONE (OUTPATIENT)
Dept: FAMILY MEDICINE | Facility: CLINIC | Age: 70
End: 2020-06-04

## 2020-06-04 DIAGNOSIS — K21.9 GASTROESOPHAGEAL REFLUX DISEASE, ESOPHAGITIS PRESENCE NOT SPECIFIED: Primary | ICD-10-CM

## 2020-06-04 DIAGNOSIS — R10.9 ABDOMINAL PAIN, UNSPECIFIED ABDOMINAL LOCATION: ICD-10-CM

## 2020-06-04 DIAGNOSIS — F41.9 ANXIETY: ICD-10-CM

## 2020-06-04 PROCEDURE — 99213 OFFICE O/P EST LOW 20 MIN: CPT | Mod: 95,,, | Performed by: NURSE PRACTITIONER

## 2020-06-04 PROCEDURE — 1159F MED LIST DOCD IN RCRD: CPT | Mod: 95,,, | Performed by: NURSE PRACTITIONER

## 2020-06-04 PROCEDURE — 1101F PT FALLS ASSESS-DOCD LE1/YR: CPT | Mod: 95,,, | Performed by: NURSE PRACTITIONER

## 2020-06-04 PROCEDURE — 99213 PR OFFICE/OUTPT VISIT, EST, LEVL III, 20-29 MIN: ICD-10-PCS | Mod: 95,,, | Performed by: NURSE PRACTITIONER

## 2020-06-04 PROCEDURE — 1159F PR MEDICATION LIST DOCUMENTED IN MEDICAL RECORD: ICD-10-PCS | Mod: 95,,, | Performed by: NURSE PRACTITIONER

## 2020-06-04 PROCEDURE — 1101F PR PT FALLS ASSESS DOC 0-1 FALLS W/OUT INJ PAST YR: ICD-10-PCS | Mod: 95,,, | Performed by: NURSE PRACTITIONER

## 2020-06-04 RX ORDER — PANTOPRAZOLE SODIUM 40 MG/1
40 TABLET, DELAYED RELEASE ORAL DAILY
Qty: 30 TABLET | Refills: 11 | Status: SHIPPED | OUTPATIENT
Start: 2020-06-04 | End: 2020-10-13

## 2020-06-04 RX ORDER — FAMOTIDINE 40 MG/1
40 TABLET, FILM COATED ORAL NIGHTLY PRN
Qty: 30 TABLET | Refills: 11 | Status: SHIPPED | OUTPATIENT
Start: 2020-06-04 | End: 2020-10-13

## 2020-06-04 NOTE — PROGRESS NOTES
Subjective:        The chief complaint leading to consultation is: check up  The patient location is:  Home  Visit type: Virtual visit with synchronous audio/video or audio only  This was a video visit in lieu of in-person visit due to the coronavirus emergency. Patient acknowledged and consented to the video visit encounter.     69 year old female presents for virtual visit. Is currently under a lot of stress. 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 Started belching more frequently. Has some abdominal discomfort. No vomiting. Mild nausea. No fever. Last bm was this am. Describes as normal. Taking sertraline. Not sure that medication is working. Still worries a lot. Feels irritable at times.       Past Surgical History:   Procedure Laterality Date    BREAST CYST ASPIRATION Left     left wrist      arthritis    TUBAL LIGATION       History reviewed. No pertinent past medical history.  Family History   Problem Relation Age of Onset    Eczema Neg Hx     Lupus Neg Hx     Psoriasis Neg Hx     Melanoma Neg Hx         Social History:   Marital Status:   Alcohol History:  reports that she drinks alcohol.  Tobacco History:  reports that she has quit smoking. She has never used smokeless tobacco.  Drug History:  reports that she does not use drugs.    Review of patient's allergies indicates:  No Known Allergies    Current Outpatient Medications   Medication Sig Dispense Refill    ascorbate calcium, vitamin C, 500 mg Tab Vitamin C 500 mg tablet   Take 1 tablet every day by oral route.      calcium-vitamin D3 (CALCIUM 500 + D) 500 mg(1,250mg) -200 unit per tablet Take 1 tablet by mouth 2 (two) times daily with meals.      famotidine (PEPCID) 40 MG tablet Take 1 tablet (40 mg total) by mouth nightly as needed for Heartburn. 30 tablet 11    flu vac ts 2016-17,4 yr up,/PF (FLU VAC TS 2014-15,36MOS+,,PF,) 45 mcg (15 mcg x 3)/0.5 mL Fluzone 4452-7875(PF) 45 mcg (15 mcg x 3)/0.5 mL intramuscular  syringe      levocetirizine (XYZAL) 5 MG tablet Take 1 tablet (5 mg total) by mouth every evening. 30 tablet 11    magnesium 200 mg Tab magnesium      multivit-min-iron-FA-K.gin (CENTRUM PERFORMANCE) 18 mg iron-400 mcg-50 mg Tab Take 1 tablet by mouth once daily.       pantoprazole (PROTONIX) 40 MG tablet Take 1 tablet (40 mg total) by mouth once daily. 30 tablet 11    promethazine-dextromethorphan (PROMETHAZINE-DM) 6.25-15 mg/5 mL Syrp       sertraline (ZOLOFT) 25 MG tablet Take 1 tablet (25 mg total) by mouth once daily. 30 tablet 1     No current facility-administered medications for this visit.        Review of Systems   Constitutional: Negative for activity change and unexpected weight change.   HENT: Negative for hearing loss, rhinorrhea and trouble swallowing.    Eyes: Negative for discharge and visual disturbance.   Respiratory: Negative for chest tightness and wheezing.    Cardiovascular: Negative for chest pain and palpitations.   Gastrointestinal: Positive for abdominal pain. Negative for blood in stool, constipation, diarrhea and vomiting.   Endocrine: Negative for polydipsia and polyuria.   Genitourinary: Negative for difficulty urinating, dysuria, hematuria and menstrual problem.   Musculoskeletal: Positive for arthralgias and neck pain. Negative for joint swelling.   Neurological: Negative for weakness and headaches.   Psychiatric/Behavioral: Negative for confusion and dysphoric mood.         Objective:        Physical Exam:   Physical Exam   Constitutional: She appears well-developed and well-nourished.   Psychiatric: She has a normal mood and affect. Her behavior is normal.            Assessment:       1. Gastroesophageal reflux disease, esophagitis presence not specified    2. Abdominal pain, unspecified abdominal location    3. Bursitis/tendonitis, shoulder    4. Anxiety      Plan:   Gastroesophageal reflux disease, esophagitis presence not specified  -     pantoprazole (PROTONIX) 40 MG  tablet; Take 1 tablet (40 mg total) by mouth once daily.  Dispense: 30 tablet; Refill: 11  -     famotidine (PEPCID) 40 MG tablet; Take 1 tablet (40 mg total) by mouth nightly as needed for Heartburn.  Dispense: 30 tablet; Refill: 11  -     US Abdomen Complete; Future    Abdominal pain, unspecified abdominal location  -     US Abdomen Complete; Future    Bursitis/tendonitis, shoulder    Anxiety      Follow up in about 4 weeks (around 7/2/2020) for medication management.    Total time spent with patient: 15 min    Each patient to whom he or she provides medical services by telemedicine is:  (1) informed of the relationship between the physician and patient and the respective role of any other health care provider with respect to management of the patient; and (2) notified that he or she may decline to receive medical services by telemedicine and may withdraw from such care at any time.    This note was created using [employer] voice recognition software that occasionally misinterprets phrases or words.

## 2020-06-09 ENCOUNTER — HOSPITAL ENCOUNTER (OUTPATIENT)
Dept: RADIOLOGY | Facility: HOSPITAL | Age: 70
Discharge: HOME | End: 2020-06-09
Attending: NURSE PRACTITIONER
Payer: MEDICARE

## 2020-06-09 DIAGNOSIS — R10.9 ABDOMINAL PAIN, UNSPECIFIED ABDOMINAL LOCATION: ICD-10-CM

## 2020-06-09 DIAGNOSIS — K21.9 GASTROESOPHAGEAL REFLUX DISEASE, ESOPHAGITIS PRESENCE NOT SPECIFIED: ICD-10-CM

## 2020-06-09 PROCEDURE — 76700 US EXAM ABDOM COMPLETE: CPT | Mod: TC,PO

## 2020-07-28 ENCOUNTER — PATIENT MESSAGE (OUTPATIENT)
Dept: FAMILY MEDICINE | Facility: CLINIC | Age: 70
End: 2020-07-28

## 2020-07-28 DIAGNOSIS — F32.5 MAJOR DEPRESSIVE DISORDER WITH SINGLE EPISODE, IN REMISSION: ICD-10-CM

## 2020-07-28 RX ORDER — SERTRALINE HYDROCHLORIDE 25 MG/1
25 TABLET, FILM COATED ORAL DAILY
Qty: 30 TABLET | Refills: 3 | Status: SHIPPED | OUTPATIENT
Start: 2020-07-28 | End: 2020-11-19 | Stop reason: SDUPTHER

## 2020-08-28 DIAGNOSIS — Z78.0 MENOPAUSE: Primary | ICD-10-CM

## 2020-08-28 DIAGNOSIS — Z12.31 ENCOUNTER FOR SCREENING MAMMOGRAM FOR MALIGNANT NEOPLASM OF BREAST: ICD-10-CM

## 2020-09-22 ENCOUNTER — HOSPITAL ENCOUNTER (OUTPATIENT)
Dept: RADIOLOGY | Facility: HOSPITAL | Age: 70
Discharge: HOME | End: 2020-09-22
Attending: SPECIALIST
Payer: MEDICARE

## 2020-09-22 DIAGNOSIS — Z12.31 ENCOUNTER FOR SCREENING MAMMOGRAM FOR MALIGNANT NEOPLASM OF BREAST: ICD-10-CM

## 2020-09-22 DIAGNOSIS — Z78.0 MENOPAUSE: ICD-10-CM

## 2020-09-22 PROCEDURE — 77067 SCR MAMMO BI INCL CAD: CPT | Mod: TC,PO

## 2020-09-22 PROCEDURE — 77080 DXA BONE DENSITY AXIAL: CPT | Mod: TC,PO

## 2020-10-10 LAB
ALBUMIN SERPL-MCNC: 4.6 G/DL (ref 3.6–5.1)
ALBUMIN/CREAT UR: 8 MCG/MG CREAT
ALBUMIN/GLOB SERPL: 1.8 (CALC) (ref 1–2.5)
ALP SERPL-CCNC: 70 U/L (ref 37–153)
ALT SERPL-CCNC: 17 U/L (ref 6–29)
APPEARANCE UR: CLEAR
AST SERPL-CCNC: 20 U/L (ref 10–35)
BACTERIA #/AREA URNS HPF: ABNORMAL /HPF
BACTERIA UR CULT: ABNORMAL
BASOPHILS # BLD AUTO: 91 CELLS/UL (ref 0–200)
BASOPHILS NFR BLD AUTO: 2.4 %
BILIRUB SERPL-MCNC: 1.1 MG/DL (ref 0.2–1.2)
BILIRUB UR QL STRIP: NEGATIVE
BUN SERPL-MCNC: 15 MG/DL (ref 7–25)
BUN/CREAT SERPL: NORMAL (CALC) (ref 6–22)
CALCIUM SERPL-MCNC: 9.4 MG/DL (ref 8.6–10.4)
CHLORIDE SERPL-SCNC: 98 MMOL/L (ref 98–110)
CHOLEST SERPL-MCNC: 253 MG/DL
CHOLEST/HDLC SERPL: 3.5 (CALC)
CO2 SERPL-SCNC: 32 MMOL/L (ref 20–32)
COLOR UR: YELLOW
CREAT SERPL-MCNC: 0.57 MG/DL (ref 0.5–0.99)
CREAT UR-MCNC: 91 MG/DL (ref 20–275)
EOSINOPHIL # BLD AUTO: 49 CELLS/UL (ref 15–500)
EOSINOPHIL NFR BLD AUTO: 1.3 %
ERYTHROCYTE [DISTWIDTH] IN BLOOD BY AUTOMATED COUNT: 12 % (ref 11–15)
GFRSERPLBLD MDRD-ARVRAT: 95 ML/MIN/1.73M2
GLOBULIN SER CALC-MCNC: 2.6 G/DL (CALC) (ref 1.9–3.7)
GLUCOSE SERPL-MCNC: 85 MG/DL (ref 65–99)
GLUCOSE UR QL STRIP: NEGATIVE
HCT VFR BLD AUTO: 41 % (ref 35–45)
HDLC SERPL-MCNC: 72 MG/DL
HGB BLD-MCNC: 13.1 G/DL (ref 11.7–15.5)
HGB UR QL STRIP: NEGATIVE
HYALINE CASTS #/AREA URNS LPF: ABNORMAL /LPF
KETONES UR QL STRIP: NEGATIVE
LDLC SERPL CALC-MCNC: 156 MG/DL (CALC)
LEUKOCYTE ESTERASE UR QL STRIP: NEGATIVE
LYMPHOCYTES # BLD AUTO: 1303 CELLS/UL (ref 850–3900)
LYMPHOCYTES NFR BLD AUTO: 34.3 %
MCH RBC QN AUTO: 30.7 PG (ref 27–33)
MCHC RBC AUTO-ENTMCNC: 32 G/DL (ref 32–36)
MCV RBC AUTO: 96 FL (ref 80–100)
MICROALBUMIN UR-MCNC: 0.7 MG/DL
MONOCYTES # BLD AUTO: 513 CELLS/UL (ref 200–950)
MONOCYTES NFR BLD AUTO: 13.5 %
NEUTROPHILS # BLD AUTO: 1843 CELLS/UL (ref 1500–7800)
NEUTROPHILS NFR BLD AUTO: 48.5 %
NITRITE UR QL STRIP: NEGATIVE
NONHDLC SERPL-MCNC: 181 MG/DL (CALC)
PH UR STRIP: ABNORMAL [PH] (ref 5–8)
PLATELET # BLD AUTO: 209 THOUSAND/UL (ref 140–400)
PMV BLD REES-ECKER: 11.9 FL (ref 7.5–12.5)
POTASSIUM SERPL-SCNC: 4 MMOL/L (ref 3.5–5.3)
PROT SERPL-MCNC: 7.2 G/DL (ref 6.1–8.1)
PROT UR QL STRIP: NEGATIVE
RBC # BLD AUTO: 4.27 MILLION/UL (ref 3.8–5.1)
RBC #/AREA URNS HPF: ABNORMAL /HPF
SODIUM SERPL-SCNC: 136 MMOL/L (ref 135–146)
SP GR UR STRIP: 1.02 (ref 1–1.03)
SQUAMOUS #/AREA URNS HPF: ABNORMAL /HPF
TRIGL SERPL-MCNC: 128 MG/DL
TSH SERPL-ACNC: 1.54 MIU/L (ref 0.4–4.5)
WBC # BLD AUTO: 3.8 THOUSAND/UL (ref 3.8–10.8)
WBC #/AREA URNS HPF: ABNORMAL /HPF

## 2020-10-13 ENCOUNTER — OFFICE VISIT (OUTPATIENT)
Dept: FAMILY MEDICINE | Facility: CLINIC | Age: 70
End: 2020-10-13
Payer: MEDICARE

## 2020-10-13 VITALS
WEIGHT: 112 LBS | HEIGHT: 62 IN | TEMPERATURE: 98 F | SYSTOLIC BLOOD PRESSURE: 92 MMHG | HEART RATE: 68 BPM | DIASTOLIC BLOOD PRESSURE: 60 MMHG | BODY MASS INDEX: 20.61 KG/M2

## 2020-10-13 DIAGNOSIS — M50.30 DDD (DEGENERATIVE DISC DISEASE), CERVICAL: ICD-10-CM

## 2020-10-13 DIAGNOSIS — J30.1 ALLERGIC RHINITIS DUE TO POLLEN, UNSPECIFIED SEASONALITY: ICD-10-CM

## 2020-10-13 DIAGNOSIS — F32.5 MAJOR DEPRESSIVE DISORDER WITH SINGLE EPISODE, IN REMISSION: ICD-10-CM

## 2020-10-13 DIAGNOSIS — Z23 NEED FOR INFLUENZA VACCINATION: Primary | ICD-10-CM

## 2020-10-13 DIAGNOSIS — F41.9 ANXIETY: ICD-10-CM

## 2020-10-13 DIAGNOSIS — Z79.899 HIGH RISK MEDICATION USE: ICD-10-CM

## 2020-10-13 PROCEDURE — 99214 PR OFFICE/OUTPT VISIT, EST, LEVL IV, 30-39 MIN: ICD-10-PCS | Mod: 25,S$GLB,, | Performed by: NURSE PRACTITIONER

## 2020-10-13 PROCEDURE — 1101F PR PT FALLS ASSESS DOC 0-1 FALLS W/OUT INJ PAST YR: ICD-10-PCS | Mod: S$GLB,,, | Performed by: NURSE PRACTITIONER

## 2020-10-13 PROCEDURE — 1159F PR MEDICATION LIST DOCUMENTED IN MEDICAL RECORD: ICD-10-PCS | Mod: S$GLB,,, | Performed by: NURSE PRACTITIONER

## 2020-10-13 PROCEDURE — 1159F MED LIST DOCD IN RCRD: CPT | Mod: S$GLB,,, | Performed by: NURSE PRACTITIONER

## 2020-10-13 PROCEDURE — G0008 ADMIN INFLUENZA VIRUS VAC: HCPCS | Mod: S$GLB,,, | Performed by: NURSE PRACTITIONER

## 2020-10-13 PROCEDURE — 3008F BODY MASS INDEX DOCD: CPT | Mod: S$GLB,,, | Performed by: NURSE PRACTITIONER

## 2020-10-13 PROCEDURE — 99214 OFFICE O/P EST MOD 30 MIN: CPT | Mod: 25,S$GLB,, | Performed by: NURSE PRACTITIONER

## 2020-10-13 PROCEDURE — 90662 IIV NO PRSV INCREASED AG IM: CPT | Mod: S$GLB,,, | Performed by: NURSE PRACTITIONER

## 2020-10-13 PROCEDURE — 90662 FLU VACCINE - QUADRIVALENT - HIGH DOSE (65+) PRESERVATIVE FREE IM: ICD-10-PCS | Mod: S$GLB,,, | Performed by: NURSE PRACTITIONER

## 2020-10-13 PROCEDURE — 3008F PR BODY MASS INDEX (BMI) DOCUMENTED: ICD-10-PCS | Mod: S$GLB,,, | Performed by: NURSE PRACTITIONER

## 2020-10-13 PROCEDURE — G0008 FLU VACCINE - QUADRIVALENT - HIGH DOSE (65+) PRESERVATIVE FREE IM: ICD-10-PCS | Mod: S$GLB,,, | Performed by: NURSE PRACTITIONER

## 2020-10-13 PROCEDURE — 1101F PT FALLS ASSESS-DOCD LE1/YR: CPT | Mod: S$GLB,,, | Performed by: NURSE PRACTITIONER

## 2020-10-13 RX ORDER — SUCRALFATE 1 G/1
TABLET ORAL
COMMUNITY
Start: 2020-10-09 | End: 2021-04-13

## 2020-10-13 NOTE — PROGRESS NOTES
SUBJECTIVE:    Patient ID: Sylvia Maxwell is a 69 y.o. female.    Chief Complaint: Follow-up (no bottles - colonoscopy done Oct 2019 - flu vaccine done - tk)    69 year old female presents for check up. Being treated for depression and gerd. gerd symptoms have improved but still not 100%. Being followed by dr. Lewis. Recently started on carafate. Helpful so far. Still feels some stress due to situation with covid. Mother recently passed away. Worries a lot. Would like to discuss recent labs. Trying to exercise more.  Still has intermittent back and neck pain.       Office Visit on 04/20/2020   Component Date Value Ref Range Status    WBC 10/09/2020 3.8  3.8 - 10.8 Thousand/uL Final    RBC 10/09/2020 4.27  3.80 - 5.10 Million/uL Final    Hemoglobin 10/09/2020 13.1  11.7 - 15.5 g/dL Final    Hematocrit 10/09/2020 41.0  35.0 - 45.0 % Final    Mean Corpuscular Volume 10/09/2020 96.0  80.0 - 100.0 fL Final    Mean Corpuscular Hemoglobin 10/09/2020 30.7  27.0 - 33.0 pg Final    Mean Corpuscular Hemoglobin Conc 10/09/2020 32.0  32.0 - 36.0 g/dL Final    RDW 10/09/2020 12.0  11.0 - 15.0 % Final    Platelets 10/09/2020 209  140 - 400 Thousand/uL Final    MPV 10/09/2020 11.9  7.5 - 12.5 fL Final    Neutrophils Absolute 10/09/2020 1,843  1,500 - 7,800 cells/uL Final    Lymph # 10/09/2020 1,303  850 - 3,900 cells/uL Final    Mono # 10/09/2020 513  200 - 950 cells/uL Final    Eos # 10/09/2020 49  15 - 500 cells/uL Final    Baso # 10/09/2020 91  0 - 200 cells/uL Final    Neutrophils Relative 10/09/2020 48.5  % Final    Lymph% 10/09/2020 34.3  % Final    Mono% 10/09/2020 13.5  % Final    Eosinophil% 10/09/2020 1.3  % Final    Basophil% 10/09/2020 2.4  % Final    Glucose 10/09/2020 85  65 - 99 mg/dL Final    BUN, Bld 10/09/2020 15  7 - 25 mg/dL Final    Creatinine 10/09/2020 0.57  0.50 - 0.99 mg/dL Final    eGFR if non African American 10/09/2020 95  > OR = 60 mL/min/1.73m2 Final    eGFR if African  American 10/09/2020 110  > OR = 60 mL/min/1.73m2 Final    BUN/Creatinine Ratio 10/09/2020 NOT APPLICABLE  6 - 22 (calc) Final    Sodium 10/09/2020 136  135 - 146 mmol/L Final    Potassium 10/09/2020 4.0  3.5 - 5.3 mmol/L Final    Chloride 10/09/2020 98  98 - 110 mmol/L Final    CO2 10/09/2020 32  20 - 32 mmol/L Final    Calcium 10/09/2020 9.4  8.6 - 10.4 mg/dL Final    Total Protein 10/09/2020 7.2  6.1 - 8.1 g/dL Final    Albumin 10/09/2020 4.6  3.6 - 5.1 g/dL Final    Globulin, Total 10/09/2020 2.6  1.9 - 3.7 g/dL (calc) Final    Albumin/Globulin Ratio 10/09/2020 1.8  1.0 - 2.5 (calc) Final    Total Bilirubin 10/09/2020 1.1  0.2 - 1.2 mg/dL Final    Alkaline Phosphatase 10/09/2020 70  37 - 153 U/L Final    AST 10/09/2020 20  10 - 35 U/L Final    ALT 10/09/2020 17  6 - 29 U/L Final    Cholesterol 10/09/2020 253* <200 mg/dL Final    HDL 10/09/2020 72  > OR = 50 mg/dL Final    Triglycerides 10/09/2020 128  <150 mg/dL Final    LDL Cholesterol 10/09/2020 156* mg/dL (calc) Final    Hdl/Cholesterol Ratio 10/09/2020 3.5  <5.0 (calc) Final    Non HDL Chol. (LDL+VLDL) 10/09/2020 181* <130 mg/dL (calc) Final    TSH w/reflex to FT4 10/09/2020 1.54  0.40 - 4.50 mIU/L Final    Creatinine, Random Ur 10/09/2020 91  20 - 275 mg/dL Final    Microalb, Ur 10/09/2020 0.7  See Note: mg/dL Final    Microalb Creat Ratio 10/09/2020 8  <30 mcg/mg creat Final    Color, UA 10/09/2020 YELLOW  YELLOW Final    Appearance, UA 10/09/2020 CLEAR  CLEAR Final    Specific [place], UA 10/09/2020 1.017  1.001 - 1.035 Final    pH, UA 10/09/2020 > OR = 8.5* 5.0 - 8.0 Final    Glucose, UA 10/09/2020 NEGATIVE  NEGATIVE Final    Bilirubin, UA 10/09/2020 NEGATIVE  NEGATIVE Final    Ketones, UA 10/09/2020 NEGATIVE  NEGATIVE Final    Occult Blood UA 10/09/2020 NEGATIVE  NEGATIVE Final    Protein, UA 10/09/2020 NEGATIVE  NEGATIVE Final    Nitrite, UA 10/09/2020 NEGATIVE  NEGATIVE Final    Leukocytes, UA 10/09/2020 NEGATIVE   NEGATIVE Final    WBC Casts, UA 10/09/2020 NONE SEEN  < OR = 5 /HPF Final    RBC Casts, UA 10/09/2020 NONE SEEN  < OR = 2 /HPF Final    Squam Epithel, UA 10/09/2020 NONE SEEN  < OR = 5 /HPF Final    Bacteria, UA 10/09/2020 NONE SEEN  NONE SEEN /HPF Final    Hyaline Casts, UA 10/09/2020 NONE SEEN  NONE SEEN /LPF Final    Reflexive Urine Culture 10/09/2020    Final       History reviewed. No pertinent past medical history.  Social History     Socioeconomic History    Marital status:      Spouse name: Not on file    Number of children: Not on file    Years of education: Not on file    Highest education level: Not on file   Occupational History    Not on file   Social Needs    Financial resource strain: Not hard at all    Food insecurity     Worry: Never true     Inability: Never true    Transportation needs     Medical: No     Non-medical: No   Tobacco Use    Smoking status: Former Smoker    Smokeless tobacco: Never Used   Substance and Sexual Activity    Alcohol use: Yes     Frequency: 2-4 times a month     Drinks per session: 1 or 2     Binge frequency: Never    Drug use: Never    Sexual activity: Not on file   Lifestyle    Physical activity     Days per week: 5 days     Minutes per session: 30 min    Stress: Not at all   Relationships    Social connections     Talks on phone: Twice a week     Gets together: Patient refused     Attends [employer] service: Not on file     Active member of club or organization: No     Attends meetings of clubs or organizations: Not on file     Relationship status:    Other Topics Concern    Are you pregnant or think you may be? Not Asked    Breast-feeding Not Asked   Social History Narrative    Not on file     Past Surgical History:   Procedure Laterality Date    BREAST CYST ASPIRATION Left     left wrist      arthritis    TUBAL LIGATION       Family History   Problem Relation Age of Onset    Eczema Neg Hx     Lupus Neg Hx     Psoriasis Neg  "Hx     Melanoma Neg Hx        Review of patient's allergies indicates:  No Known Allergies    Current Outpatient Medications:     ascorbate calcium, vitamin C, 500 mg Tab, Vitamin C 500 mg tablet  Take 1 tablet every day by oral route., Disp: , Rfl:     calcium-vitamin D3 (CALCIUM 500 + D) 500 mg(1,250mg) -200 unit per tablet, Take 1 tablet by mouth 2 (two) times daily with meals., Disp: , Rfl:     magnesium 200 mg Tab, magnesium, Disp: , Rfl:     multivit-min-iron-FA-K.gin (CENTRUM PERFORMANCE) 18 mg iron-400 mcg-50 mg Tab, Take 1 tablet by mouth once daily. , Disp: , Rfl:     sertraline (ZOLOFT) 25 MG tablet, Take 1 tablet (25 mg total) by mouth once daily., Disp: 30 tablet, Rfl: 3    sucralfate (CARAFATE) 1 gram tablet, sucralfate 1 gram tablet  Take 1 tablet 4 times a day by oral route as directed for 90 days., Disp: , Rfl:     Review of Systems   Constitutional: Negative for chills, fever and unexpected weight change.   HENT: Negative for ear pain, rhinorrhea and sore throat.    Eyes: Negative for pain and visual disturbance.   Respiratory: Negative for cough and shortness of breath.    Cardiovascular: Negative for chest pain, palpitations and leg swelling.   Gastrointestinal: Negative for abdominal pain, diarrhea, nausea and vomiting.   Genitourinary: Negative for difficulty urinating, hematuria and vaginal bleeding.   Musculoskeletal: Negative for arthralgias.   Skin: Negative for rash.   Neurological: Negative for dizziness, weakness and headaches.   Psychiatric/Behavioral: Negative for agitation and sleep disturbance. The patient is not nervous/anxious.           Objective:      Vitals:    10/13/20 1326   BP: 92/60   Pulse: 68   Temp: 98.2 °F (36.8 °C)   Weight: 50.8 kg (112 lb)   Height: 5' 2" (1.575 m)     Physical Exam  Constitutional:       Appearance: She is well-developed.   HENT:      Right Ear: External ear normal.      Left Ear: External ear normal.   Eyes:      Conjunctiva/sclera: " Conjunctivae normal.      Pupils: Pupils are equal, round, and reactive to light.   Neck:      Musculoskeletal: Normal range of motion and neck supple.      Vascular: No JVD.   Cardiovascular:      Rate and Rhythm: Normal rate and regular rhythm.      Heart sounds: No murmur.   Pulmonary:      Effort: Pulmonary effort is normal.      Breath sounds: Normal breath sounds.   Abdominal:      General: Bowel sounds are normal.      Palpations: Abdomen is soft.   Musculoskeletal:         General: No deformity.      Lumbar back: She exhibits decreased range of motion. She exhibits no tenderness and no swelling.   Lymphadenopathy:      Cervical: No cervical adenopathy.   Skin:     General: Skin is warm and dry.      Findings: No rash.   Neurological:      Mental Status: She is alert and oriented to person, place, and time.      Gait: Gait normal.   Psychiatric:         Speech: Speech normal.         Behavior: Behavior normal.           Assessment:       1. Need for influenza vaccination    2. Major depressive disorder with single episode, in remission    3. High risk medication use    4. Anxiety    5. Allergic rhinitis due to pollen, unspecified seasonality    6. DDD (degenerative disc disease), cervical         Plan:       Need for influenza vaccination  -     Influenza - Quadrivalent - High Dose (65+) (PF) (IM)    Major depressive disorder with single episode, in remission    High risk medication use    Anxiety    Allergic rhinitis due to pollen, unspecified seasonality    DDD (degenerative disc disease), cervical  Comments:  plans to see chiropractor      Follow up in about 3 months (around 1/13/2021) for medication management.        10/19/2020 Bonnie Arita

## 2020-11-19 ENCOUNTER — PATIENT MESSAGE (OUTPATIENT)
Dept: FAMILY MEDICINE | Facility: CLINIC | Age: 70
End: 2020-11-19

## 2020-11-19 DIAGNOSIS — F32.5 MAJOR DEPRESSIVE DISORDER WITH SINGLE EPISODE, IN REMISSION: ICD-10-CM

## 2020-11-19 RX ORDER — SERTRALINE HYDROCHLORIDE 25 MG/1
25 TABLET, FILM COATED ORAL DAILY
Qty: 90 TABLET | Refills: 1 | Status: SHIPPED | OUTPATIENT
Start: 2020-11-19 | End: 2020-11-20 | Stop reason: SDUPTHER

## 2020-11-20 DIAGNOSIS — F32.5 MAJOR DEPRESSIVE DISORDER WITH SINGLE EPISODE, IN REMISSION: ICD-10-CM

## 2020-11-20 RX ORDER — SERTRALINE HYDROCHLORIDE 25 MG/1
25 TABLET, FILM COATED ORAL DAILY
Qty: 90 TABLET | Refills: 1 | Status: SHIPPED | OUTPATIENT
Start: 2020-11-20 | End: 2021-11-16 | Stop reason: SDUPTHER

## 2020-11-20 NOTE — TELEPHONE ENCOUNTER
----- Message from Otoniel Bosch sent at 11/20/2020 11:28 AM CST -----  Regarding: Refill  Contact: Oly Maxwell  Pt says the pharmacy told her they haven't receive her Zoloft refill. Please re-send it to the [facility]  Pt# [phone]

## 2020-12-09 ENCOUNTER — OFFICE VISIT (OUTPATIENT)
Dept: CARDIOLOGY | Facility: CLINIC | Age: 70
End: 2020-12-09
Payer: MEDICARE

## 2020-12-09 VITALS
BODY MASS INDEX: 20.24 KG/M2 | HEART RATE: 69 BPM | SYSTOLIC BLOOD PRESSURE: 100 MMHG | RESPIRATION RATE: 18 BRPM | DIASTOLIC BLOOD PRESSURE: 72 MMHG | WEIGHT: 110 LBS | OXYGEN SATURATION: 98 % | HEIGHT: 62 IN

## 2020-12-09 DIAGNOSIS — E78.2 MIXED HYPERLIPIDEMIA: ICD-10-CM

## 2020-12-09 DIAGNOSIS — I34.1 MVP (MITRAL VALVE PROLAPSE): Primary | ICD-10-CM

## 2020-12-09 PROCEDURE — 3078F PR MOST RECENT DIASTOLIC BLOOD PRESSURE < 80 MM HG: ICD-10-PCS | Mod: CPTII,S$GLB,, | Performed by: INTERNAL MEDICINE

## 2020-12-09 PROCEDURE — 3288F FALL RISK ASSESSMENT DOCD: CPT | Mod: CPTII,S$GLB,, | Performed by: INTERNAL MEDICINE

## 2020-12-09 PROCEDURE — 3074F SYST BP LT 130 MM HG: CPT | Mod: CPTII,S$GLB,, | Performed by: INTERNAL MEDICINE

## 2020-12-09 PROCEDURE — 99499 UNLISTED E&M SERVICE: CPT | Mod: S$GLB,,, | Performed by: INTERNAL MEDICINE

## 2020-12-09 PROCEDURE — 93000 EKG 12-LEAD: ICD-10-PCS | Mod: S$GLB,,, | Performed by: INTERNAL MEDICINE

## 2020-12-09 PROCEDURE — 3288F PR FALLS RISK ASSESSMENT DOCUMENTED: ICD-10-PCS | Mod: CPTII,S$GLB,, | Performed by: INTERNAL MEDICINE

## 2020-12-09 PROCEDURE — 3074F PR MOST RECENT SYSTOLIC BLOOD PRESSURE < 130 MM HG: ICD-10-PCS | Mod: CPTII,S$GLB,, | Performed by: INTERNAL MEDICINE

## 2020-12-09 PROCEDURE — 3008F BODY MASS INDEX DOCD: CPT | Mod: CPTII,S$GLB,, | Performed by: INTERNAL MEDICINE

## 2020-12-09 PROCEDURE — 1101F PR PT FALLS ASSESS DOC 0-1 FALLS W/OUT INJ PAST YR: ICD-10-PCS | Mod: CPTII,S$GLB,, | Performed by: INTERNAL MEDICINE

## 2020-12-09 PROCEDURE — 93000 ELECTROCARDIOGRAM COMPLETE: CPT | Mod: S$GLB,,, | Performed by: INTERNAL MEDICINE

## 2020-12-09 PROCEDURE — 1101F PT FALLS ASSESS-DOCD LE1/YR: CPT | Mod: CPTII,S$GLB,, | Performed by: INTERNAL MEDICINE

## 2020-12-09 PROCEDURE — 3008F PR BODY MASS INDEX (BMI) DOCUMENTED: ICD-10-PCS | Mod: CPTII,S$GLB,, | Performed by: INTERNAL MEDICINE

## 2020-12-09 PROCEDURE — 99499 NO LOS: ICD-10-PCS | Mod: S$GLB,,, | Performed by: INTERNAL MEDICINE

## 2020-12-09 PROCEDURE — 3078F DIAST BP <80 MM HG: CPT | Mod: CPTII,S$GLB,, | Performed by: INTERNAL MEDICINE

## 2020-12-09 RX ORDER — FAMOTIDINE 40 MG/5ML
20 POWDER, FOR SUSPENSION ORAL 2 TIMES DAILY
COMMUNITY
End: 2021-12-14

## 2021-02-09 ENCOUNTER — TELEPHONE (OUTPATIENT)
Dept: FAMILY MEDICINE | Facility: CLINIC | Age: 71
End: 2021-02-09

## 2021-02-10 ENCOUNTER — OFFICE VISIT (OUTPATIENT)
Dept: FAMILY MEDICINE | Facility: CLINIC | Age: 71
End: 2021-02-10
Payer: MEDICARE

## 2021-02-10 VITALS
DIASTOLIC BLOOD PRESSURE: 76 MMHG | WEIGHT: 111 LBS | HEART RATE: 76 BPM | SYSTOLIC BLOOD PRESSURE: 114 MMHG | HEIGHT: 62 IN | BODY MASS INDEX: 20.43 KG/M2

## 2021-02-10 DIAGNOSIS — Z71.89 EDUCATED ABOUT COVID-19 VIRUS INFECTION: ICD-10-CM

## 2021-02-10 DIAGNOSIS — R51.9 NONINTRACTABLE HEADACHE, UNSPECIFIED CHRONICITY PATTERN, UNSPECIFIED HEADACHE TYPE: ICD-10-CM

## 2021-02-10 DIAGNOSIS — J32.9 SINUSITIS, UNSPECIFIED CHRONICITY, UNSPECIFIED LOCATION: Primary | ICD-10-CM

## 2021-02-10 DIAGNOSIS — R51.9 HEAD ACHE: ICD-10-CM

## 2021-02-10 PROCEDURE — 1159F PR MEDICATION LIST DOCUMENTED IN MEDICAL RECORD: ICD-10-PCS | Mod: S$GLB,,, | Performed by: NURSE PRACTITIONER

## 2021-02-10 PROCEDURE — 1101F PR PT FALLS ASSESS DOC 0-1 FALLS W/OUT INJ PAST YR: ICD-10-PCS | Mod: S$GLB,,, | Performed by: NURSE PRACTITIONER

## 2021-02-10 PROCEDURE — 3008F BODY MASS INDEX DOCD: CPT | Mod: S$GLB,,, | Performed by: NURSE PRACTITIONER

## 2021-02-10 PROCEDURE — 3008F PR BODY MASS INDEX (BMI) DOCUMENTED: ICD-10-PCS | Mod: S$GLB,,, | Performed by: NURSE PRACTITIONER

## 2021-02-10 PROCEDURE — 3288F FALL RISK ASSESSMENT DOCD: CPT | Mod: S$GLB,,, | Performed by: NURSE PRACTITIONER

## 2021-02-10 PROCEDURE — U0003 INFECTIOUS AGENT DETECTION BY NUCLEIC ACID (DNA OR RNA); SEVERE ACUTE RESPIRATORY SYNDROME CORONAVIRUS 2 (SARS-COV-2) (CORONAVIRUS DISEASE [COVID-19]), AMPLIFIED PROBE TECHNIQUE, MAKING USE OF HIGH THROUGHPUT TECHNOLOGIES AS DESCRIBED BY CMS-2020-01-R: HCPCS

## 2021-02-10 PROCEDURE — 96372 PR INJECTION,THERAP/PROPH/DIAG2ST, IM OR SUBCUT: ICD-10-PCS | Mod: S$GLB,,, | Performed by: NURSE PRACTITIONER

## 2021-02-10 PROCEDURE — 99213 PR OFFICE/OUTPT VISIT, EST, LEVL III, 20-29 MIN: ICD-10-PCS | Mod: 25,S$GLB,CS, | Performed by: NURSE PRACTITIONER

## 2021-02-10 PROCEDURE — 99213 OFFICE O/P EST LOW 20 MIN: CPT | Mod: 25,S$GLB,CS, | Performed by: NURSE PRACTITIONER

## 2021-02-10 PROCEDURE — U0005 INFEC AGEN DETEC AMPLI PROBE: HCPCS

## 2021-02-10 PROCEDURE — 3288F PR FALLS RISK ASSESSMENT DOCUMENTED: ICD-10-PCS | Mod: S$GLB,,, | Performed by: NURSE PRACTITIONER

## 2021-02-10 PROCEDURE — 1159F MED LIST DOCD IN RCRD: CPT | Mod: S$GLB,,, | Performed by: NURSE PRACTITIONER

## 2021-02-10 PROCEDURE — 1101F PT FALLS ASSESS-DOCD LE1/YR: CPT | Mod: S$GLB,,, | Performed by: NURSE PRACTITIONER

## 2021-02-10 PROCEDURE — 96372 THER/PROPH/DIAG INJ SC/IM: CPT | Mod: S$GLB,,, | Performed by: NURSE PRACTITIONER

## 2021-02-10 RX ORDER — DEXAMETHASONE SODIUM PHOSPHATE 4 MG/ML
8 INJECTION, SOLUTION INTRA-ARTICULAR; INTRALESIONAL; INTRAMUSCULAR; INTRAVENOUS; SOFT TISSUE ONCE
Status: COMPLETED | OUTPATIENT
Start: 2021-02-10 | End: 2021-02-10

## 2021-02-10 RX ORDER — AZITHROMYCIN 250 MG/1
TABLET, FILM COATED ORAL
Qty: 6 TABLET | Refills: 0 | Status: SHIPPED | OUTPATIENT
Start: 2021-02-10 | End: 2021-02-15

## 2021-02-10 RX ADMIN — DEXAMETHASONE SODIUM PHOSPHATE 8 MG: 4 INJECTION, SOLUTION INTRA-ARTICULAR; INTRALESIONAL; INTRAMUSCULAR; INTRAVENOUS; SOFT TISSUE at 08:02

## 2021-02-11 LAB — SARS-COV-2 RNA RESP QL NAA+PROBE: NOT DETECTED

## 2021-03-04 ENCOUNTER — PATIENT MESSAGE (OUTPATIENT)
Dept: FAMILY MEDICINE | Facility: CLINIC | Age: 71
End: 2021-03-04

## 2021-03-23 ENCOUNTER — TELEPHONE (OUTPATIENT)
Dept: FAMILY MEDICINE | Facility: CLINIC | Age: 71
End: 2021-03-23

## 2021-03-23 DIAGNOSIS — Z79.899 ENCOUNTER FOR LONG-TERM (CURRENT) USE OF OTHER MEDICATIONS: Primary | ICD-10-CM

## 2021-04-13 ENCOUNTER — PATIENT MESSAGE (OUTPATIENT)
Dept: FAMILY MEDICINE | Facility: CLINIC | Age: 71
End: 2021-04-13

## 2021-04-13 ENCOUNTER — OFFICE VISIT (OUTPATIENT)
Dept: FAMILY MEDICINE | Facility: CLINIC | Age: 71
End: 2021-04-13
Payer: MEDICARE

## 2021-04-13 VITALS
HEART RATE: 74 BPM | SYSTOLIC BLOOD PRESSURE: 110 MMHG | BODY MASS INDEX: 20.98 KG/M2 | HEIGHT: 62 IN | WEIGHT: 114 LBS | DIASTOLIC BLOOD PRESSURE: 70 MMHG

## 2021-04-13 DIAGNOSIS — F32.5 MAJOR DEPRESSIVE DISORDER WITH SINGLE EPISODE, IN REMISSION: ICD-10-CM

## 2021-04-13 DIAGNOSIS — G54.9 MYELORADICULOPATHY: ICD-10-CM

## 2021-04-13 DIAGNOSIS — Z79.899 HIGH RISK MEDICATION USE: ICD-10-CM

## 2021-04-13 DIAGNOSIS — F41.9 ANXIETY: ICD-10-CM

## 2021-04-13 DIAGNOSIS — J32.9 SINUSITIS, UNSPECIFIED CHRONICITY, UNSPECIFIED LOCATION: Primary | ICD-10-CM

## 2021-04-13 PROCEDURE — 1101F PT FALLS ASSESS-DOCD LE1/YR: CPT | Mod: S$GLB,,, | Performed by: NURSE PRACTITIONER

## 2021-04-13 PROCEDURE — 3008F PR BODY MASS INDEX (BMI) DOCUMENTED: ICD-10-PCS | Mod: S$GLB,,, | Performed by: NURSE PRACTITIONER

## 2021-04-13 PROCEDURE — 1159F PR MEDICATION LIST DOCUMENTED IN MEDICAL RECORD: ICD-10-PCS | Mod: S$GLB,,, | Performed by: NURSE PRACTITIONER

## 2021-04-13 PROCEDURE — 3008F BODY MASS INDEX DOCD: CPT | Mod: S$GLB,,, | Performed by: NURSE PRACTITIONER

## 2021-04-13 PROCEDURE — 99214 OFFICE O/P EST MOD 30 MIN: CPT | Mod: S$GLB,,, | Performed by: NURSE PRACTITIONER

## 2021-04-13 PROCEDURE — 3288F FALL RISK ASSESSMENT DOCD: CPT | Mod: S$GLB,,, | Performed by: NURSE PRACTITIONER

## 2021-04-13 PROCEDURE — 1159F MED LIST DOCD IN RCRD: CPT | Mod: S$GLB,,, | Performed by: NURSE PRACTITIONER

## 2021-04-13 PROCEDURE — 1101F PR PT FALLS ASSESS DOC 0-1 FALLS W/OUT INJ PAST YR: ICD-10-PCS | Mod: S$GLB,,, | Performed by: NURSE PRACTITIONER

## 2021-04-13 PROCEDURE — 99214 PR OFFICE/OUTPT VISIT, EST, LEVL IV, 30-39 MIN: ICD-10-PCS | Mod: S$GLB,,, | Performed by: NURSE PRACTITIONER

## 2021-04-13 PROCEDURE — 3288F PR FALLS RISK ASSESSMENT DOCUMENTED: ICD-10-PCS | Mod: S$GLB,,, | Performed by: NURSE PRACTITIONER

## 2021-04-20 ENCOUNTER — HOSPITAL ENCOUNTER (OUTPATIENT)
Dept: RADIOLOGY | Facility: HOSPITAL | Age: 71
Discharge: HOME | End: 2021-04-20
Attending: NURSE PRACTITIONER
Payer: MEDICARE

## 2021-04-20 DIAGNOSIS — J32.9 SINUSITIS, UNSPECIFIED CHRONICITY, UNSPECIFIED LOCATION: ICD-10-CM

## 2021-04-20 PROCEDURE — 70220 X-RAY EXAM OF SINUSES: CPT | Mod: TC,PO

## 2021-04-22 ENCOUNTER — TELEPHONE (OUTPATIENT)
Dept: FAMILY MEDICINE | Facility: CLINIC | Age: 71
End: 2021-04-22

## 2021-05-03 ENCOUNTER — PATIENT MESSAGE (OUTPATIENT)
Dept: FAMILY MEDICINE | Facility: CLINIC | Age: 71
End: 2021-05-03

## 2021-05-03 DIAGNOSIS — Z79.899 HIGH RISK MEDICATION USE: Primary | ICD-10-CM

## 2021-05-03 DIAGNOSIS — Z78.9 OTHER SPECIFIED HEALTH STATUS: ICD-10-CM

## 2021-05-03 DIAGNOSIS — Z00.00 PHYSICAL EXAM: ICD-10-CM

## 2021-05-03 DIAGNOSIS — Z82.49 FAMILY HISTORY OF HEART DISEASE: ICD-10-CM

## 2021-05-05 LAB
ALBUMIN SERPL-MCNC: 4.5 G/DL (ref 3.6–5.1)
ALBUMIN/GLOB SERPL: 1.6 (CALC) (ref 1–2.5)
ALP SERPL-CCNC: 74 U/L (ref 37–153)
ALT SERPL-CCNC: 17 U/L (ref 6–29)
APPEARANCE UR: CLEAR
AST SERPL-CCNC: 23 U/L (ref 10–35)
BACTERIA #/AREA URNS HPF: ABNORMAL /HPF
BACTERIA UR CULT: ABNORMAL
BASOPHILS # BLD AUTO: 101 CELLS/UL (ref 0–200)
BASOPHILS NFR BLD AUTO: 2.4 %
BILIRUB SERPL-MCNC: 0.9 MG/DL (ref 0.2–1.2)
BILIRUB UR QL STRIP: NEGATIVE
BUN SERPL-MCNC: 17 MG/DL (ref 7–25)
BUN/CREAT SERPL: ABNORMAL (CALC) (ref 6–22)
CALCIUM SERPL-MCNC: 9.5 MG/DL (ref 8.6–10.4)
CHLORIDE SERPL-SCNC: 96 MMOL/L (ref 98–110)
CHOLEST SERPL-MCNC: 249 MG/DL
CHOLEST/HDLC SERPL: 3 (CALC)
CO2 SERPL-SCNC: 30 MMOL/L (ref 20–32)
COLOR UR: YELLOW
CREAT SERPL-MCNC: 0.58 MG/DL (ref 0.5–0.99)
EOSINOPHIL # BLD AUTO: 42 CELLS/UL (ref 15–500)
EOSINOPHIL NFR BLD AUTO: 1 %
ERYTHROCYTE [DISTWIDTH] IN BLOOD BY AUTOMATED COUNT: 12 % (ref 11–15)
GLOBULIN SER CALC-MCNC: 2.8 G/DL (CALC) (ref 1.9–3.7)
GLUCOSE SERPL-MCNC: 83 MG/DL (ref 65–99)
GLUCOSE UR QL STRIP: NEGATIVE
HCT VFR BLD AUTO: 40 % (ref 35–45)
HDLC SERPL-MCNC: 83 MG/DL
HGB BLD-MCNC: 13.2 G/DL (ref 11.7–15.5)
HGB UR QL STRIP: NEGATIVE
HYALINE CASTS #/AREA URNS LPF: ABNORMAL /LPF
KETONES UR QL STRIP: NEGATIVE
LDLC SERPL CALC-MCNC: 144 MG/DL (CALC)
LEUKOCYTE ESTERASE UR QL STRIP: NEGATIVE
LYMPHOCYTES # BLD AUTO: 1432 CELLS/UL (ref 850–3900)
LYMPHOCYTES NFR BLD AUTO: 34.1 %
MCH RBC QN AUTO: 30.7 PG (ref 27–33)
MCHC RBC AUTO-ENTMCNC: 33 G/DL (ref 32–36)
MCV RBC AUTO: 93 FL (ref 80–100)
MONOCYTES # BLD AUTO: 546 CELLS/UL (ref 200–950)
MONOCYTES NFR BLD AUTO: 13 %
NEUTROPHILS # BLD AUTO: 2079 CELLS/UL (ref 1500–7800)
NEUTROPHILS NFR BLD AUTO: 49.5 %
NITRITE UR QL STRIP: NEGATIVE
NONHDLC SERPL-MCNC: 166 MG/DL (CALC)
PH UR STRIP: ABNORMAL [PH] (ref 5–8)
PLATELET # BLD AUTO: 216 THOUSAND/UL (ref 140–400)
PMV BLD REES-ECKER: 11.7 FL (ref 7.5–12.5)
POTASSIUM SERPL-SCNC: 4.4 MMOL/L (ref 3.5–5.3)
PROT SERPL-MCNC: 7.3 G/DL (ref 6.1–8.1)
PROT UR QL STRIP: NEGATIVE
RBC # BLD AUTO: 4.3 MILLION/UL (ref 3.8–5.1)
RBC #/AREA URNS HPF: ABNORMAL /HPF
SODIUM SERPL-SCNC: 135 MMOL/L (ref 135–146)
SP GR UR STRIP: 1.02 (ref 1–1.03)
SQUAMOUS #/AREA URNS HPF: ABNORMAL /HPF
TRIGL SERPL-MCNC: 106 MG/DL
TSH SERPL-ACNC: 1.66 MIU/L (ref 0.4–4.5)
WBC # BLD AUTO: 4.2 THOUSAND/UL (ref 3.8–10.8)
WBC #/AREA URNS HPF: ABNORMAL /HPF

## 2021-05-06 LAB
CRP SERPL HS-MCNC: 0.5 MG/L
ERYTHROCYTE [SEDIMENTATION RATE] IN BLOOD BY WESTERGREN METHOD: 6 MM/H

## 2021-05-07 ENCOUNTER — TELEPHONE (OUTPATIENT)
Dept: FAMILY MEDICINE | Facility: CLINIC | Age: 71
End: 2021-05-07

## 2021-05-08 ENCOUNTER — PATIENT MESSAGE (OUTPATIENT)
Dept: FAMILY MEDICINE | Facility: CLINIC | Age: 71
End: 2021-05-08

## 2021-05-10 ENCOUNTER — TELEPHONE (OUTPATIENT)
Dept: FAMILY MEDICINE | Facility: CLINIC | Age: 71
End: 2021-05-10

## 2021-05-13 ENCOUNTER — TELEPHONE (OUTPATIENT)
Dept: FAMILY MEDICINE | Facility: CLINIC | Age: 71
End: 2021-05-13

## 2021-05-26 ENCOUNTER — PATIENT MESSAGE (OUTPATIENT)
Dept: FAMILY MEDICINE | Facility: CLINIC | Age: 71
End: 2021-05-26

## 2021-05-26 DIAGNOSIS — E78.5 HYPERLIPIDEMIA, UNSPECIFIED HYPERLIPIDEMIA TYPE: Primary | ICD-10-CM

## 2021-05-26 RX ORDER — ROSUVASTATIN CALCIUM 5 MG/1
5 TABLET, COATED ORAL DAILY
Qty: 90 TABLET | Refills: 1 | Status: SHIPPED | OUTPATIENT
Start: 2021-05-26 | End: 2021-12-20 | Stop reason: SDUPTHER

## 2021-07-12 ENCOUNTER — PATIENT MESSAGE (OUTPATIENT)
Dept: FAMILY MEDICINE | Facility: CLINIC | Age: 71
End: 2021-07-12

## 2021-07-12 RX ORDER — PANTOPRAZOLE SODIUM 40 MG/1
40 TABLET, DELAYED RELEASE ORAL DAILY
Qty: 90 TABLET | Refills: 1 | Status: SHIPPED | OUTPATIENT
Start: 2021-07-12 | End: 2022-01-11 | Stop reason: SDUPTHER

## 2021-07-12 RX ORDER — PANTOPRAZOLE SODIUM 40 MG/1
1 TABLET, DELAYED RELEASE ORAL DAILY
COMMUNITY
Start: 2021-04-08 | End: 2021-07-12 | Stop reason: SDUPTHER

## 2021-07-13 ENCOUNTER — PATIENT MESSAGE (OUTPATIENT)
Dept: FAMILY MEDICINE | Facility: CLINIC | Age: 71
End: 2021-07-13

## 2021-07-20 ENCOUNTER — OFFICE VISIT (OUTPATIENT)
Dept: FAMILY MEDICINE | Facility: CLINIC | Age: 71
End: 2021-07-20
Payer: MEDICARE

## 2021-07-20 VITALS
DIASTOLIC BLOOD PRESSURE: 68 MMHG | HEIGHT: 62 IN | WEIGHT: 117 LBS | BODY MASS INDEX: 21.53 KG/M2 | HEART RATE: 64 BPM | SYSTOLIC BLOOD PRESSURE: 118 MMHG

## 2021-07-20 DIAGNOSIS — F41.9 ANXIETY: ICD-10-CM

## 2021-07-20 DIAGNOSIS — E78.5 HYPERLIPIDEMIA, UNSPECIFIED HYPERLIPIDEMIA TYPE: Primary | ICD-10-CM

## 2021-07-20 DIAGNOSIS — K21.9 GASTROESOPHAGEAL REFLUX DISEASE, UNSPECIFIED WHETHER ESOPHAGITIS PRESENT: ICD-10-CM

## 2021-07-20 DIAGNOSIS — N64.4 MASTODYNIA OF LEFT BREAST: ICD-10-CM

## 2021-07-20 DIAGNOSIS — Z79.899 HIGH RISK MEDICATION USE: ICD-10-CM

## 2021-07-20 DIAGNOSIS — F32.5 MAJOR DEPRESSIVE DISORDER WITH SINGLE EPISODE, IN REMISSION: ICD-10-CM

## 2021-07-20 DIAGNOSIS — Z82.49 FAMILY HISTORY OF HEART DISEASE: ICD-10-CM

## 2021-07-20 PROCEDURE — 1160F RVW MEDS BY RX/DR IN RCRD: CPT | Mod: S$GLB,,, | Performed by: NURSE PRACTITIONER

## 2021-07-20 PROCEDURE — 1159F MED LIST DOCD IN RCRD: CPT | Mod: S$GLB,,, | Performed by: NURSE PRACTITIONER

## 2021-07-20 PROCEDURE — 1159F PR MEDICATION LIST DOCUMENTED IN MEDICAL RECORD: ICD-10-PCS | Mod: S$GLB,,, | Performed by: NURSE PRACTITIONER

## 2021-07-20 PROCEDURE — 3008F BODY MASS INDEX DOCD: CPT | Mod: S$GLB,,, | Performed by: NURSE PRACTITIONER

## 2021-07-20 PROCEDURE — 3008F PR BODY MASS INDEX (BMI) DOCUMENTED: ICD-10-PCS | Mod: S$GLB,,, | Performed by: NURSE PRACTITIONER

## 2021-07-20 PROCEDURE — 99214 PR OFFICE/OUTPT VISIT, EST, LEVL IV, 30-39 MIN: ICD-10-PCS | Mod: S$GLB,,, | Performed by: NURSE PRACTITIONER

## 2021-07-20 PROCEDURE — 1160F PR REVIEW ALL MEDS BY PRESCRIBER/CLIN PHARMACIST DOCUMENTED: ICD-10-PCS | Mod: S$GLB,,, | Performed by: NURSE PRACTITIONER

## 2021-07-20 PROCEDURE — 99214 OFFICE O/P EST MOD 30 MIN: CPT | Mod: S$GLB,,, | Performed by: NURSE PRACTITIONER

## 2021-07-27 ENCOUNTER — HOSPITAL ENCOUNTER (OUTPATIENT)
Dept: RADIOLOGY | Facility: HOSPITAL | Age: 71
Discharge: HOME | End: 2021-07-27
Attending: NURSE PRACTITIONER
Payer: MEDICARE

## 2021-07-27 VITALS — WEIGHT: 117.06 LBS | HEIGHT: 62 IN | BODY MASS INDEX: 21.54 KG/M2

## 2021-07-27 DIAGNOSIS — N64.4 MASTODYNIA OF LEFT BREAST: ICD-10-CM

## 2021-07-27 PROCEDURE — 77066 DX MAMMO INCL CAD BI: CPT | Mod: TC,PO

## 2021-07-27 PROCEDURE — 76642 ULTRASOUND BREAST LIMITED: CPT | Mod: TC,PO,LT

## 2021-08-02 ENCOUNTER — TELEPHONE (OUTPATIENT)
Dept: FAMILY MEDICINE | Facility: CLINIC | Age: 71
End: 2021-08-02

## 2021-08-03 ENCOUNTER — OFFICE VISIT (OUTPATIENT)
Dept: FAMILY MEDICINE | Facility: CLINIC | Age: 71
End: 2021-08-03
Payer: MEDICARE

## 2021-08-03 VITALS
WEIGHT: 115 LBS | HEART RATE: 64 BPM | BODY MASS INDEX: 21.16 KG/M2 | HEIGHT: 62 IN | SYSTOLIC BLOOD PRESSURE: 110 MMHG | DIASTOLIC BLOOD PRESSURE: 74 MMHG

## 2021-08-03 DIAGNOSIS — F32.5 MAJOR DEPRESSIVE DISORDER WITH SINGLE EPISODE, IN REMISSION: ICD-10-CM

## 2021-08-03 DIAGNOSIS — E78.5 HYPERLIPIDEMIA, UNSPECIFIED HYPERLIPIDEMIA TYPE: ICD-10-CM

## 2021-08-03 DIAGNOSIS — J30.1 SEASONAL ALLERGIC RHINITIS DUE TO POLLEN: Primary | ICD-10-CM

## 2021-08-03 PROCEDURE — 1101F PR PT FALLS ASSESS DOC 0-1 FALLS W/OUT INJ PAST YR: ICD-10-PCS | Mod: S$GLB,,, | Performed by: FAMILY MEDICINE

## 2021-08-03 PROCEDURE — 3288F PR FALLS RISK ASSESSMENT DOCUMENTED: ICD-10-PCS | Mod: S$GLB,,, | Performed by: FAMILY MEDICINE

## 2021-08-03 PROCEDURE — U0003 INFECTIOUS AGENT DETECTION BY NUCLEIC ACID (DNA OR RNA); SEVERE ACUTE RESPIRATORY SYNDROME CORONAVIRUS 2 (SARS-COV-2) (CORONAVIRUS DISEASE [COVID-19]), AMPLIFIED PROBE TECHNIQUE, MAKING USE OF HIGH THROUGHPUT TECHNOLOGIES AS DESCRIBED BY CMS-2020-01-R: HCPCS | Performed by: FAMILY MEDICINE

## 2021-08-03 PROCEDURE — 99213 OFFICE O/P EST LOW 20 MIN: CPT | Mod: S$GLB,,, | Performed by: FAMILY MEDICINE

## 2021-08-03 PROCEDURE — 3078F DIAST BP <80 MM HG: CPT | Mod: S$GLB,,, | Performed by: FAMILY MEDICINE

## 2021-08-03 PROCEDURE — 99213 PR OFFICE/OUTPT VISIT, EST, LEVL III, 20-29 MIN: ICD-10-PCS | Mod: S$GLB,,, | Performed by: FAMILY MEDICINE

## 2021-08-03 PROCEDURE — 1159F MED LIST DOCD IN RCRD: CPT | Mod: S$GLB,,, | Performed by: FAMILY MEDICINE

## 2021-08-03 PROCEDURE — U0005 INFEC AGEN DETEC AMPLI PROBE: HCPCS | Performed by: FAMILY MEDICINE

## 2021-08-03 PROCEDURE — 3078F PR MOST RECENT DIASTOLIC BLOOD PRESSURE < 80 MM HG: ICD-10-PCS | Mod: S$GLB,,, | Performed by: FAMILY MEDICINE

## 2021-08-03 PROCEDURE — 3074F PR MOST RECENT SYSTOLIC BLOOD PRESSURE < 130 MM HG: ICD-10-PCS | Mod: S$GLB,,, | Performed by: FAMILY MEDICINE

## 2021-08-03 PROCEDURE — 3008F BODY MASS INDEX DOCD: CPT | Mod: S$GLB,,, | Performed by: FAMILY MEDICINE

## 2021-08-03 PROCEDURE — 1101F PT FALLS ASSESS-DOCD LE1/YR: CPT | Mod: S$GLB,,, | Performed by: FAMILY MEDICINE

## 2021-08-03 PROCEDURE — 3074F SYST BP LT 130 MM HG: CPT | Mod: S$GLB,,, | Performed by: FAMILY MEDICINE

## 2021-08-03 PROCEDURE — 1159F PR MEDICATION LIST DOCUMENTED IN MEDICAL RECORD: ICD-10-PCS | Mod: S$GLB,,, | Performed by: FAMILY MEDICINE

## 2021-08-03 PROCEDURE — 3288F FALL RISK ASSESSMENT DOCD: CPT | Mod: S$GLB,,, | Performed by: FAMILY MEDICINE

## 2021-08-03 PROCEDURE — 3008F PR BODY MASS INDEX (BMI) DOCUMENTED: ICD-10-PCS | Mod: S$GLB,,, | Performed by: FAMILY MEDICINE

## 2021-08-03 RX ORDER — FLUTICASONE PROPIONATE 50 MCG
1 SPRAY, SUSPENSION (ML) NASAL 2 TIMES DAILY
Qty: 16 G | Refills: 2 | Status: SHIPPED | OUTPATIENT
Start: 2021-08-03 | End: 2021-12-14

## 2021-08-04 LAB
SARS-COV-2 RNA RESP QL NAA+PROBE: NOT DETECTED
SARS-COV-2- CYCLE NUMBER: -1

## 2021-08-09 ENCOUNTER — TELEPHONE (OUTPATIENT)
Dept: FAMILY MEDICINE | Facility: CLINIC | Age: 71
End: 2021-08-09

## 2021-08-10 ENCOUNTER — TELEPHONE (OUTPATIENT)
Dept: FAMILY MEDICINE | Facility: CLINIC | Age: 71
End: 2021-08-10

## 2021-09-22 ENCOUNTER — TELEPHONE (OUTPATIENT)
Dept: FAMILY MEDICINE | Facility: CLINIC | Age: 71
End: 2021-09-22

## 2021-09-23 ENCOUNTER — CLINICAL SUPPORT (OUTPATIENT)
Dept: FAMILY MEDICINE | Facility: CLINIC | Age: 71
End: 2021-09-23
Payer: MEDICARE

## 2021-09-23 DIAGNOSIS — Z23 NEED FOR IMMUNIZATION AGAINST INFLUENZA: Primary | ICD-10-CM

## 2021-09-23 PROCEDURE — 90662 FLU VACCINE - QUADRIVALENT - HIGH DOSE (65+) PRESERVATIVE FREE IM: ICD-10-PCS | Mod: S$GLB,,, | Performed by: NURSE PRACTITIONER

## 2021-09-23 PROCEDURE — G0008 FLU VACCINE - QUADRIVALENT - HIGH DOSE (65+) PRESERVATIVE FREE IM: ICD-10-PCS | Mod: S$GLB,,, | Performed by: NURSE PRACTITIONER

## 2021-09-23 PROCEDURE — G0008 ADMIN INFLUENZA VIRUS VAC: HCPCS | Mod: S$GLB,,, | Performed by: NURSE PRACTITIONER

## 2021-09-23 PROCEDURE — 90662 IIV NO PRSV INCREASED AG IM: CPT | Mod: S$GLB,,, | Performed by: NURSE PRACTITIONER

## 2021-09-24 LAB
ALBUMIN SERPL-MCNC: 4.4 G/DL (ref 3.6–5.1)
ALBUMIN/GLOB SERPL: 1.6 (CALC) (ref 1–2.5)
ALP SERPL-CCNC: 71 U/L (ref 37–153)
ALT SERPL-CCNC: 21 U/L (ref 6–29)
AST SERPL-CCNC: 23 U/L (ref 10–35)
BILIRUB SERPL-MCNC: 1.2 MG/DL (ref 0.2–1.2)
BUN SERPL-MCNC: 18 MG/DL (ref 7–25)
BUN/CREAT SERPL: NORMAL (CALC) (ref 6–22)
CALCIUM SERPL-MCNC: 9.4 MG/DL (ref 8.6–10.4)
CHLORIDE SERPL-SCNC: 99 MMOL/L (ref 98–110)
CHOLEST SERPL-MCNC: 195 MG/DL
CHOLEST/HDLC SERPL: 2.6 (CALC)
CO2 SERPL-SCNC: 31 MMOL/L (ref 20–32)
CREAT SERPL-MCNC: 0.54 MG/DL (ref 0.5–0.99)
GLOBULIN SER CALC-MCNC: 2.8 G/DL (CALC) (ref 1.9–3.7)
GLUCOSE SERPL-MCNC: 82 MG/DL (ref 65–99)
HDLC SERPL-MCNC: 76 MG/DL
LDLC SERPL CALC-MCNC: 98 MG/DL (CALC)
NONHDLC SERPL-MCNC: 119 MG/DL (CALC)
POTASSIUM SERPL-SCNC: 4.3 MMOL/L (ref 3.5–5.3)
PROT SERPL-MCNC: 7.2 G/DL (ref 6.1–8.1)
SODIUM SERPL-SCNC: 137 MMOL/L (ref 135–146)
TRIGL SERPL-MCNC: 110 MG/DL

## 2021-11-08 ENCOUNTER — IMMUNIZATION (OUTPATIENT)
Dept: PRIMARY CARE CLINIC | Facility: CLINIC | Age: 71
End: 2021-11-08
Payer: MEDICARE

## 2021-11-08 DIAGNOSIS — Z23 NEED FOR VACCINATION: Primary | ICD-10-CM

## 2021-11-08 PROCEDURE — 0004A COVID-19, MRNA, LNP-S, PF, 30 MCG/0.3 ML DOSE VACCINE: ICD-10-PCS | Mod: CV19,S$GLB,, | Performed by: FAMILY MEDICINE

## 2021-11-08 PROCEDURE — 0004A COVID-19, MRNA, LNP-S, PF, 30 MCG/0.3 ML DOSE VACCINE: CPT | Mod: CV19,S$GLB,, | Performed by: FAMILY MEDICINE

## 2021-11-08 PROCEDURE — 91300 COVID-19, MRNA, LNP-S, PF, 30 MCG/0.3 ML DOSE VACCINE: ICD-10-PCS | Mod: S$GLB,,, | Performed by: FAMILY MEDICINE

## 2021-11-08 PROCEDURE — 91300 COVID-19, MRNA, LNP-S, PF, 30 MCG/0.3 ML DOSE VACCINE: CPT | Mod: S$GLB,,, | Performed by: FAMILY MEDICINE

## 2021-11-16 ENCOUNTER — PATIENT MESSAGE (OUTPATIENT)
Dept: FAMILY MEDICINE | Facility: CLINIC | Age: 71
End: 2021-11-16
Payer: MEDICARE

## 2021-11-16 DIAGNOSIS — F32.5 MAJOR DEPRESSIVE DISORDER WITH SINGLE EPISODE, IN REMISSION: ICD-10-CM

## 2021-11-16 RX ORDER — SERTRALINE HYDROCHLORIDE 25 MG/1
25 TABLET, FILM COATED ORAL DAILY
Qty: 90 TABLET | Refills: 1 | Status: SHIPPED | OUTPATIENT
Start: 2021-11-16 | End: 2022-05-17 | Stop reason: SDUPTHER

## 2021-12-14 ENCOUNTER — OFFICE VISIT (OUTPATIENT)
Dept: CARDIOLOGY | Facility: CLINIC | Age: 71
End: 2021-12-14
Payer: MEDICARE

## 2021-12-14 VITALS
SYSTOLIC BLOOD PRESSURE: 120 MMHG | BODY MASS INDEX: 21.75 KG/M2 | WEIGHT: 118.19 LBS | HEART RATE: 64 BPM | OXYGEN SATURATION: 94 % | HEIGHT: 62 IN | DIASTOLIC BLOOD PRESSURE: 80 MMHG | RESPIRATION RATE: 16 BRPM

## 2021-12-14 DIAGNOSIS — R07.89 ATYPICAL CHEST PAIN: Primary | ICD-10-CM

## 2021-12-14 DIAGNOSIS — E78.5 DYSLIPIDEMIA: ICD-10-CM

## 2021-12-14 PROCEDURE — 99214 PR OFFICE/OUTPT VISIT, EST, LEVL IV, 30-39 MIN: ICD-10-PCS | Mod: S$GLB,,, | Performed by: INTERNAL MEDICINE

## 2021-12-14 PROCEDURE — 99214 OFFICE O/P EST MOD 30 MIN: CPT | Mod: S$GLB,,, | Performed by: INTERNAL MEDICINE

## 2021-12-18 ENCOUNTER — PATIENT MESSAGE (OUTPATIENT)
Dept: FAMILY MEDICINE | Facility: CLINIC | Age: 71
End: 2021-12-18
Payer: MEDICARE

## 2021-12-18 DIAGNOSIS — E78.5 HYPERLIPIDEMIA, UNSPECIFIED HYPERLIPIDEMIA TYPE: ICD-10-CM

## 2021-12-20 RX ORDER — ROSUVASTATIN CALCIUM 5 MG/1
5 TABLET, COATED ORAL DAILY
Qty: 90 TABLET | Refills: 1 | Status: SHIPPED | OUTPATIENT
Start: 2021-12-20 | End: 2022-06-16 | Stop reason: SDUPTHER

## 2022-01-05 ENCOUNTER — TELEPHONE (OUTPATIENT)
Dept: FAMILY MEDICINE | Facility: CLINIC | Age: 72
End: 2022-01-05
Payer: MEDICARE

## 2022-01-05 NOTE — TELEPHONE ENCOUNTER
Spoke with pt and offered her appts next week but she refused, states she will call back next week if no better or go to ER if sx are severe

## 2022-01-05 NOTE — TELEPHONE ENCOUNTER
----- Message from Jennifer Sawyer sent at 1/5/2022 10:07 AM CST -----  Patient called and stated that she has pain in her right side she stated that its been off and on for awhile but yesterday it was steady she would like to come in sooner to be seen then the 20th please give her a call at [phone]

## 2022-01-11 ENCOUNTER — PATIENT MESSAGE (OUTPATIENT)
Dept: FAMILY MEDICINE | Facility: CLINIC | Age: 72
End: 2022-01-11
Payer: MEDICARE

## 2022-01-11 RX ORDER — PANTOPRAZOLE SODIUM 40 MG/1
40 TABLET, DELAYED RELEASE ORAL DAILY
Qty: 90 TABLET | Refills: 0 | Status: SHIPPED | OUTPATIENT
Start: 2022-01-11 | End: 2022-04-20 | Stop reason: SDUPTHER

## 2022-01-20 ENCOUNTER — OFFICE VISIT (OUTPATIENT)
Dept: FAMILY MEDICINE | Facility: CLINIC | Age: 72
End: 2022-01-20
Payer: MEDICARE

## 2022-01-20 VITALS
HEART RATE: 80 BPM | WEIGHT: 119 LBS | DIASTOLIC BLOOD PRESSURE: 62 MMHG | HEIGHT: 62 IN | SYSTOLIC BLOOD PRESSURE: 110 MMHG | BODY MASS INDEX: 21.9 KG/M2

## 2022-01-20 DIAGNOSIS — Z82.49 FAMILY HISTORY OF HEART DISEASE: ICD-10-CM

## 2022-01-20 DIAGNOSIS — E78.5 DYSLIPIDEMIA: ICD-10-CM

## 2022-01-20 DIAGNOSIS — E78.5 HYPERLIPIDEMIA, UNSPECIFIED HYPERLIPIDEMIA TYPE: ICD-10-CM

## 2022-01-20 DIAGNOSIS — K21.9 GASTROESOPHAGEAL REFLUX DISEASE, UNSPECIFIED WHETHER ESOPHAGITIS PRESENT: ICD-10-CM

## 2022-01-20 DIAGNOSIS — M50.30 DDD (DEGENERATIVE DISC DISEASE), CERVICAL: ICD-10-CM

## 2022-01-20 DIAGNOSIS — F32.5 MAJOR DEPRESSIVE DISORDER WITH SINGLE EPISODE, IN REMISSION: Primary | ICD-10-CM

## 2022-01-20 DIAGNOSIS — F41.9 ANXIETY: ICD-10-CM

## 2022-01-20 DIAGNOSIS — M25.50 ARTHRALGIA, UNSPECIFIED JOINT: ICD-10-CM

## 2022-01-20 DIAGNOSIS — Z79.899 HIGH RISK MEDICATION USE: ICD-10-CM

## 2022-01-20 PROCEDURE — 1101F PT FALLS ASSESS-DOCD LE1/YR: CPT | Mod: S$GLB,,, | Performed by: NURSE PRACTITIONER

## 2022-01-20 PROCEDURE — 3008F BODY MASS INDEX DOCD: CPT | Mod: S$GLB,,, | Performed by: NURSE PRACTITIONER

## 2022-01-20 PROCEDURE — 99214 PR OFFICE/OUTPT VISIT, EST, LEVL IV, 30-39 MIN: ICD-10-PCS | Mod: S$GLB,,, | Performed by: NURSE PRACTITIONER

## 2022-01-20 PROCEDURE — 1159F MED LIST DOCD IN RCRD: CPT | Mod: S$GLB,,, | Performed by: NURSE PRACTITIONER

## 2022-01-20 PROCEDURE — 3288F PR FALLS RISK ASSESSMENT DOCUMENTED: ICD-10-PCS | Mod: S$GLB,,, | Performed by: NURSE PRACTITIONER

## 2022-01-20 PROCEDURE — 3008F PR BODY MASS INDEX (BMI) DOCUMENTED: ICD-10-PCS | Mod: S$GLB,,, | Performed by: NURSE PRACTITIONER

## 2022-01-20 PROCEDURE — 3074F PR MOST RECENT SYSTOLIC BLOOD PRESSURE < 130 MM HG: ICD-10-PCS | Mod: S$GLB,,, | Performed by: NURSE PRACTITIONER

## 2022-01-20 PROCEDURE — 1159F PR MEDICATION LIST DOCUMENTED IN MEDICAL RECORD: ICD-10-PCS | Mod: S$GLB,,, | Performed by: NURSE PRACTITIONER

## 2022-01-20 PROCEDURE — 99214 OFFICE O/P EST MOD 30 MIN: CPT | Mod: S$GLB,,, | Performed by: NURSE PRACTITIONER

## 2022-01-20 PROCEDURE — 1101F PR PT FALLS ASSESS DOC 0-1 FALLS W/OUT INJ PAST YR: ICD-10-PCS | Mod: S$GLB,,, | Performed by: NURSE PRACTITIONER

## 2022-01-20 PROCEDURE — 3288F FALL RISK ASSESSMENT DOCD: CPT | Mod: S$GLB,,, | Performed by: NURSE PRACTITIONER

## 2022-01-20 PROCEDURE — 3074F SYST BP LT 130 MM HG: CPT | Mod: S$GLB,,, | Performed by: NURSE PRACTITIONER

## 2022-01-20 PROCEDURE — 3078F DIAST BP <80 MM HG: CPT | Mod: S$GLB,,, | Performed by: NURSE PRACTITIONER

## 2022-01-20 PROCEDURE — 3078F PR MOST RECENT DIASTOLIC BLOOD PRESSURE < 80 MM HG: ICD-10-PCS | Mod: S$GLB,,, | Performed by: NURSE PRACTITIONER

## 2022-01-20 NOTE — PROGRESS NOTES
"  SUBJECTIVE:    Patient ID: Sylvia Maxwell is a 71 y.o. female.    Chief Complaint: Follow-up (No bottles, reviewed from list//dp)    71 y.o. female who presents today for check up. She is treated regularly for depression, dyslipidemia and gerd. Under lots of stress.  has cancer and is getting worse. Does feel like zoloft is managing symptoms. She is due for routine labs. gerd symptoms are manageable as well. She has been congested lately. Has had several covid exposures. Concerned about infecting .   She stated she has a "dry, scratchy throat" and frontal sinus pressure that is worsen when she bending down .She reports occasional rhinorrhea, but denies any fevers, chills, or muscle aches.     Had COVID19 vaccine x 2 doses back in March booster in November.    UTD DEXA - 9/20  UTD C-scope - Dr. Lewis - 10/19  UTD Mammo - 7/21      Office Visit on 08/03/2021   Component Date Value Ref Range Status    SARS-CoV2 (COVID-19) Qualitative P* 08/03/2021 Not Detected  Not Detected Final    SARS-COV-2- Cycle Number 08/03/2021 -1.00  Not Detected Final       History reviewed. No pertinent past medical history.  Social History     Socioeconomic History    Marital status:    Tobacco Use    Smoking status: Former Smoker    Smokeless tobacco: Never Used   Substance and Sexual Activity    Alcohol use: Yes    Drug use: Never     Past Surgical History:   Procedure Laterality Date    BREAST CYST ASPIRATION Left     left wrist      arthritis    TUBAL LIGATION       Family History   Problem Relation Age of Onset    Eczema Neg Hx     Lupus Neg Hx     Psoriasis Neg Hx     Melanoma Neg Hx        Review of patient's allergies indicates:  No Known Allergies    Current Outpatient Medications:     ascorbate calcium, vitamin C, 500 mg Tab, Vitamin C 500 mg tablet  Take 1 tablet every day by oral route., Disp: , Rfl:     calcium-vitamin D3 (OS-FERNANDA 500 + D3) 500 mg-5 mcg (200 unit) per tablet, Take 1 " "tablet by mouth 2 (two) times daily with meals., Disp: , Rfl:     magnesium 200 mg Tab, magnesium, Disp: , Rfl:     multivit-min-iron-FA-K.gin 18 mg iron-400 mcg-50 mg Tab, Take 1 tablet by mouth once daily. , Disp: , Rfl:     pantoprazole (PROTONIX) 40 MG tablet, Take 1 tablet (40 mg total) by mouth once daily., Disp: 90 tablet, Rfl: 0    rosuvastatin (CRESTOR) 5 MG tablet, Take 1 tablet (5 mg total) by mouth once daily., Disp: 90 tablet, Rfl: 1    sertraline (ZOLOFT) 25 MG tablet, Take 1 tablet (25 mg total) by mouth once daily., Disp: 90 tablet, Rfl: 1    Review of Systems   Constitutional: Negative for chills, fever and unexpected weight change.   HENT: Negative for ear pain, rhinorrhea and sore throat.    Eyes: Negative for pain and visual disturbance.   Respiratory: Negative for cough and shortness of breath.    Cardiovascular: Negative for chest pain, palpitations and leg swelling.   Gastrointestinal: Negative for abdominal pain, diarrhea, nausea and vomiting.   Genitourinary: Negative for difficulty urinating, hematuria and vaginal bleeding.   Musculoskeletal: Negative for arthralgias.   Skin: Negative for rash.   Neurological: Negative for dizziness, weakness and headaches.   Psychiatric/Behavioral: Negative for agitation and sleep disturbance. The patient is not nervous/anxious.           Objective:      Vitals:    01/20/22 1334   BP: 110/62   Pulse: 80   Weight: 54 kg (119 lb)   Height: 5' 2" (1.575 m)     Physical Exam  Constitutional:       Appearance: She is well-developed and well-nourished.   HENT:      Head: Normocephalic.      Right Ear: External ear normal.      Left Ear: External ear normal.      Mouth/Throat:      Mouth: Oropharynx is clear and moist.   Neck:      Vascular: No JVD.   Cardiovascular:      Rate and Rhythm: Normal rate and regular rhythm.      Heart sounds: No murmur heard.      Pulmonary:      Effort: Pulmonary effort is normal.      Breath sounds: Normal breath sounds. "   Abdominal:      General: Bowel sounds are normal.      Palpations: Abdomen is soft.   Musculoskeletal:         General: No deformity or edema. Normal range of motion.      Cervical back: Normal range of motion and neck supple.   Lymphadenopathy:      Cervical: No cervical adenopathy.   Skin:     General: Skin is warm, dry and intact.      Findings: No rash.   Neurological:      Mental Status: She is alert and oriented to person, place, and time.      Gait: Gait normal.   Psychiatric:         Mood and Affect: Mood and affect normal.         Speech: Speech normal.         Behavior: Behavior normal.           Assessment:       1. Major depressive disorder with single episode, in remission    2. Dyslipidemia    3. DDD (degenerative disc disease), cervical    4. Anxiety    5. Family history of heart disease    6. High risk medication use    7. Arthralgia, unspecified joint    8. Gastroesophageal reflux disease, unspecified whether esophagitis present    9. Hyperlipidemia, unspecified hyperlipidemia type         Plan:       Major depressive disorder with single episode, in remission  -     Lipid Panel; Future; Expected date: 01/20/2022    Dyslipidemia  -     Lipid Panel; Future; Expected date: 01/20/2022    DDD (degenerative disc disease), cervical    Anxiety    Family history of heart disease  -     CBC Auto Differential; Future; Expected date: 01/20/2022  -     Comprehensive Metabolic Panel; Future; Expected date: 01/20/2022  -     Lipid Panel; Future; Expected date: 01/20/2022  -     TSH w/reflex to FT4; Future; Expected date: 01/20/2022  -     Microalbumin/Creatinine Ratio, Urine; Future; Expected date: 01/20/2022  -     Urinalysis, Reflex to Urine Culture Urine, Clean Catch; Future; Expected date: 01/20/2022    High risk medication use  -     CBC Auto Differential; Future; Expected date: 01/20/2022  -     Comprehensive Metabolic Panel; Future; Expected date: 01/20/2022  -     Lipid Panel; Future; Expected date:  01/20/2022  -     TSH w/reflex to FT4; Future; Expected date: 01/20/2022  -     Microalbumin/Creatinine Ratio, Urine; Future; Expected date: 01/20/2022  -     Urinalysis, Reflex to Urine Culture Urine, Clean Catch; Future; Expected date: 01/20/2022    Arthralgia, unspecified joint    Gastroesophageal reflux disease, unspecified whether esophagitis present    Hyperlipidemia, unspecified hyperlipidemia type      Follow up in about 6 months (around 7/20/2022), or if symptoms worsen or fail to improve, for medication management.        1/24/2022 Bonnie Arita

## 2022-02-11 LAB
ALBUMIN SERPL-MCNC: 4.4 G/DL (ref 3.6–5.1)
ALBUMIN/CREAT UR: 10 MCG/MG CREAT
ALBUMIN/GLOB SERPL: 1.8 (CALC) (ref 1–2.5)
ALP SERPL-CCNC: 59 U/L (ref 37–153)
ALT SERPL-CCNC: 22 U/L (ref 6–29)
APPEARANCE UR: CLEAR
AST SERPL-CCNC: 23 U/L (ref 10–35)
BACTERIA #/AREA URNS HPF: ABNORMAL /HPF
BACTERIA UR CULT: ABNORMAL
BASOPHILS # BLD AUTO: 110 CELLS/UL (ref 0–200)
BASOPHILS NFR BLD AUTO: 2.4 %
BILIRUB SERPL-MCNC: 1.2 MG/DL (ref 0.2–1.2)
BILIRUB UR QL STRIP: NEGATIVE
BUN SERPL-MCNC: 25 MG/DL (ref 7–25)
BUN/CREAT SERPL: 48 (CALC) (ref 6–22)
CALCIUM SERPL-MCNC: 9.4 MG/DL (ref 8.6–10.4)
CHLORIDE SERPL-SCNC: 102 MMOL/L (ref 98–110)
CHOLEST SERPL-MCNC: 171 MG/DL
CHOLEST/HDLC SERPL: 2.3 (CALC)
CO2 SERPL-SCNC: 29 MMOL/L (ref 20–32)
COLOR UR: ABNORMAL
CREAT SERPL-MCNC: 0.52 MG/DL (ref 0.6–0.93)
CREAT UR-MCNC: 185 MG/DL (ref 20–275)
EOSINOPHIL # BLD AUTO: 32 CELLS/UL (ref 15–500)
EOSINOPHIL NFR BLD AUTO: 0.7 %
ERYTHROCYTE [DISTWIDTH] IN BLOOD BY AUTOMATED COUNT: 11.7 % (ref 11–15)
GLOBULIN SER CALC-MCNC: 2.4 G/DL (CALC) (ref 1.9–3.7)
GLUCOSE SERPL-MCNC: 92 MG/DL (ref 65–99)
GLUCOSE UR QL STRIP: NEGATIVE
HCT VFR BLD AUTO: 36.1 % (ref 35–45)
HDLC SERPL-MCNC: 75 MG/DL
HGB BLD-MCNC: 12.4 G/DL (ref 11.7–15.5)
HGB UR QL STRIP: NEGATIVE
HYALINE CASTS #/AREA URNS LPF: ABNORMAL /LPF
KETONES UR QL STRIP: ABNORMAL
LDLC SERPL CALC-MCNC: 82 MG/DL (CALC)
LEUKOCYTE ESTERASE UR QL STRIP: NEGATIVE
LYMPHOCYTES # BLD AUTO: 1274 CELLS/UL (ref 850–3900)
LYMPHOCYTES NFR BLD AUTO: 27.7 %
MCH RBC QN AUTO: 32.4 PG (ref 27–33)
MCHC RBC AUTO-ENTMCNC: 34.3 G/DL (ref 32–36)
MCV RBC AUTO: 94.3 FL (ref 80–100)
MICROALBUMIN UR-MCNC: 1.9 MG/DL
MONOCYTES # BLD AUTO: 570 CELLS/UL (ref 200–950)
MONOCYTES NFR BLD AUTO: 12.4 %
NEUTROPHILS # BLD AUTO: 2613 CELLS/UL (ref 1500–7800)
NEUTROPHILS NFR BLD AUTO: 56.8 %
NITRITE UR QL STRIP: NEGATIVE
NONHDLC SERPL-MCNC: 96 MG/DL (CALC)
PH UR STRIP: 6 [PH] (ref 5–8)
PLATELET # BLD AUTO: 207 THOUSAND/UL (ref 140–400)
PMV BLD REES-ECKER: 11.8 FL (ref 7.5–12.5)
POTASSIUM SERPL-SCNC: 4.1 MMOL/L (ref 3.5–5.3)
PROT SERPL-MCNC: 6.8 G/DL (ref 6.1–8.1)
PROT UR QL STRIP: NEGATIVE
RBC # BLD AUTO: 3.83 MILLION/UL (ref 3.8–5.1)
RBC #/AREA URNS HPF: ABNORMAL /HPF
SODIUM SERPL-SCNC: 138 MMOL/L (ref 135–146)
SP GR UR STRIP: 1.03 (ref 1–1.03)
SQUAMOUS #/AREA URNS HPF: ABNORMAL /HPF
TRIGL SERPL-MCNC: 63 MG/DL
TSH SERPL-ACNC: 1.42 MIU/L (ref 0.4–4.5)
WBC # BLD AUTO: 4.6 THOUSAND/UL (ref 3.8–10.8)
WBC #/AREA URNS HPF: ABNORMAL /HPF

## 2022-03-03 ENCOUNTER — OFFICE VISIT (OUTPATIENT)
Dept: FAMILY MEDICINE | Facility: CLINIC | Age: 72
End: 2022-03-03
Payer: MEDICARE

## 2022-03-03 VITALS
SYSTOLIC BLOOD PRESSURE: 98 MMHG | HEIGHT: 62 IN | BODY MASS INDEX: 21.35 KG/M2 | DIASTOLIC BLOOD PRESSURE: 60 MMHG | WEIGHT: 116 LBS | HEART RATE: 80 BPM

## 2022-03-03 DIAGNOSIS — M25.50 ARTHRALGIA, UNSPECIFIED JOINT: ICD-10-CM

## 2022-03-03 DIAGNOSIS — Z20.822 COVID-19 RULED OUT: Primary | ICD-10-CM

## 2022-03-03 DIAGNOSIS — K21.9 GASTROESOPHAGEAL REFLUX DISEASE, UNSPECIFIED WHETHER ESOPHAGITIS PRESENT: ICD-10-CM

## 2022-03-03 DIAGNOSIS — M17.0 PRIMARY OSTEOARTHRITIS OF BOTH KNEES: ICD-10-CM

## 2022-03-03 DIAGNOSIS — J30.1 SEASONAL ALLERGIC RHINITIS DUE TO POLLEN: ICD-10-CM

## 2022-03-03 DIAGNOSIS — E78.5 HYPERLIPIDEMIA, UNSPECIFIED HYPERLIPIDEMIA TYPE: ICD-10-CM

## 2022-03-03 DIAGNOSIS — F32.5 MAJOR DEPRESSIVE DISORDER WITH SINGLE EPISODE, IN REMISSION: ICD-10-CM

## 2022-03-03 LAB
CTP QC/QA: YES
SARS-COV-2 RDRP RESP QL NAA+PROBE: NEGATIVE

## 2022-03-03 PROCEDURE — U0002 COVID-19 LAB TEST NON-CDC: HCPCS | Mod: QW,S$GLB,, | Performed by: NURSE PRACTITIONER

## 2022-03-03 PROCEDURE — 1159F MED LIST DOCD IN RCRD: CPT | Mod: S$GLB,,, | Performed by: NURSE PRACTITIONER

## 2022-03-03 PROCEDURE — U0002: ICD-10-PCS | Mod: QW,S$GLB,, | Performed by: NURSE PRACTITIONER

## 2022-03-03 PROCEDURE — 99214 PR OFFICE/OUTPT VISIT, EST, LEVL IV, 30-39 MIN: ICD-10-PCS | Mod: S$GLB,,, | Performed by: NURSE PRACTITIONER

## 2022-03-03 PROCEDURE — 1160F RVW MEDS BY RX/DR IN RCRD: CPT | Mod: S$GLB,,, | Performed by: NURSE PRACTITIONER

## 2022-03-03 PROCEDURE — 99214 OFFICE O/P EST MOD 30 MIN: CPT | Mod: S$GLB,,, | Performed by: NURSE PRACTITIONER

## 2022-03-03 PROCEDURE — 1101F PT FALLS ASSESS-DOCD LE1/YR: CPT | Mod: S$GLB,,, | Performed by: NURSE PRACTITIONER

## 2022-03-03 PROCEDURE — 3288F FALL RISK ASSESSMENT DOCD: CPT | Mod: S$GLB,,, | Performed by: NURSE PRACTITIONER

## 2022-03-03 PROCEDURE — 1160F PR REVIEW ALL MEDS BY PRESCRIBER/CLIN PHARMACIST DOCUMENTED: ICD-10-PCS | Mod: S$GLB,,, | Performed by: NURSE PRACTITIONER

## 2022-03-03 PROCEDURE — 1159F PR MEDICATION LIST DOCUMENTED IN MEDICAL RECORD: ICD-10-PCS | Mod: S$GLB,,, | Performed by: NURSE PRACTITIONER

## 2022-03-03 PROCEDURE — 3008F PR BODY MASS INDEX (BMI) DOCUMENTED: ICD-10-PCS | Mod: S$GLB,,, | Performed by: NURSE PRACTITIONER

## 2022-03-03 PROCEDURE — 1101F PR PT FALLS ASSESS DOC 0-1 FALLS W/OUT INJ PAST YR: ICD-10-PCS | Mod: S$GLB,,, | Performed by: NURSE PRACTITIONER

## 2022-03-03 PROCEDURE — 3288F PR FALLS RISK ASSESSMENT DOCUMENTED: ICD-10-PCS | Mod: S$GLB,,, | Performed by: NURSE PRACTITIONER

## 2022-03-03 PROCEDURE — 3008F BODY MASS INDEX DOCD: CPT | Mod: S$GLB,,, | Performed by: NURSE PRACTITIONER

## 2022-03-03 NOTE — PROGRESS NOTES
"  SUBJECTIVE:    Patient ID: Sylvia Maxwell is a 71 y.o. female.    Chief Complaint: Knee Pain (When climbing stairs or getting up and down x1 week, may have been exposed to covid wants to get tested been 9 days since being around person who was sick, went over meds verbally// SW)    71 y.o. female who presents today for urgent visit. She is treated regularly for depression, dyslipidemia and gerd. Her  recently passed away. She is grieving his loss. She has been managing ok.  She has been congested lately. Has had several covid exposures.   She stated she has a "dry, scratchy throat" and frontal sinus pressure that is worsen when she bending down .She reports occasional rhinorrhea, but denies any fevers, chills, or muscle aches.   She is complaining of knee pain. Has been going on x 7-8 days. Denies injury or trauma. Seems to be worse with weight bearing. Has tried taking otc meds with no relief.   Had COVID19 vaccine x 2 doses back in March booster in November.  Had labs in 2/22      Office Visit on 03/03/2022   Component Date Value Ref Range Status    POC Rapid COVID 03/03/2022 Negative  Negative Final     Acceptable 03/03/2022 Yes   Final   Office Visit on 01/20/2022   Component Date Value Ref Range Status    WBC 02/10/2022 4.6  3.8 - 10.8 Thousand/uL Final    RBC 02/10/2022 3.83  3.80 - 5.10 Million/uL Final    Hemoglobin 02/10/2022 12.4  11.7 - 15.5 g/dL Final    Hematocrit 02/10/2022 36.1  35.0 - 45.0 % Final    MCV 02/10/2022 94.3  80.0 - 100.0 fL Final    MCH 02/10/2022 32.4  27.0 - 33.0 pg Final    MCHC 02/10/2022 34.3  32.0 - 36.0 g/dL Final    RDW 02/10/2022 11.7  11.0 - 15.0 % Final    Platelets 02/10/2022 207  140 - 400 Thousand/uL Final    MPV 02/10/2022 11.8  7.5 - 12.5 fL Final    Neutrophils, Abs 02/10/2022 2,613  1,500 - 7,800 cells/uL Final    Lymph # 02/10/2022 1,274  850 - 3,900 cells/uL Final    Mono # 02/10/2022 570  200 - 950 cells/uL Final    Eos # " 02/10/2022 32  15 - 500 cells/uL Final    Baso # 02/10/2022 110  0 - 200 cells/uL Final    Neutrophils Relative 02/10/2022 56.8  % Final    Lymph % 02/10/2022 27.7  % Final    Mono % 02/10/2022 12.4  % Final    Eosinophil % 02/10/2022 0.7  % Final    Basophil % 02/10/2022 2.4  % Final    Glucose 02/10/2022 92  65 - 99 mg/dL Final    BUN 02/10/2022 25  7 - 25 mg/dL Final    Creatinine 02/10/2022 0.52 (A) 0.60 - 0.93 mg/dL Final    eGFR if non African American 02/10/2022 97  > OR = 60 mL/min/1.73m2 Final    eGFR if  02/10/2022 112  > OR = 60 mL/min/1.73m2 Final    BUN/Creatinine Ratio 02/10/2022 48 (A) 6 - 22 (calc) Final    Sodium 02/10/2022 138  135 - 146 mmol/L Final    Potassium 02/10/2022 4.1  3.5 - 5.3 mmol/L Final    Chloride 02/10/2022 102  98 - 110 mmol/L Final    CO2 02/10/2022 29  20 - 32 mmol/L Final    Calcium 02/10/2022 9.4  8.6 - 10.4 mg/dL Final    Total Protein 02/10/2022 6.8  6.1 - 8.1 g/dL Final    Albumin 02/10/2022 4.4  3.6 - 5.1 g/dL Final    Globulin, Total 02/10/2022 2.4  1.9 - 3.7 g/dL (calc) Final    Albumin/Globulin Ratio 02/10/2022 1.8  1.0 - 2.5 (calc) Final    Total Bilirubin 02/10/2022 1.2  0.2 - 1.2 mg/dL Final    Alkaline Phosphatase 02/10/2022 59  37 - 153 U/L Final    AST 02/10/2022 23  10 - 35 U/L Final    ALT 02/10/2022 22  6 - 29 U/L Final    Cholesterol 02/10/2022 171  <200 mg/dL Final    HDL 02/10/2022 75  > OR = 50 mg/dL Final    Triglycerides 02/10/2022 63  <150 mg/dL Final    LDL Cholesterol 02/10/2022 82  mg/dL (calc) Final    HDL/Cholesterol Ratio 02/10/2022 2.3  <5.0 (calc) Final    Non HDL Chol. (LDL+VLDL) 02/10/2022 96  <130 mg/dL (calc) Final    TSH w/reflex to FT4 02/10/2022 1.42  0.40 - 4.50 mIU/L Final    Creatinine, Urine 02/10/2022 185  20 - 275 mg/dL Final    Microalb, Ur 02/10/2022 1.9  See Note: mg/dL Final    Microalb/Creat Ratio 02/10/2022 10  <30 mcg/mg creat Final    Color, UA 02/10/2022 DARK YELLOW  YELLOW  Final    Appearance, UA 02/10/2022 CLEAR  CLEAR Final    Specific [place], UA 02/10/2022 1.026  1.001 - 1.035 Final    pH, UA 02/10/2022 6.0  5.0 - 8.0 Final    Glucose, UA 02/10/2022 NEGATIVE  NEGATIVE Final    Bilirubin, UA 02/10/2022 NEGATIVE  NEGATIVE Final    Ketones, UA 02/10/2022 TRACE (A) NEGATIVE Final    Occult Blood UA 02/10/2022 NEGATIVE  NEGATIVE Final    Protein, UA 02/10/2022 NEGATIVE  NEGATIVE Final    Nitrite, UA 02/10/2022 NEGATIVE  NEGATIVE Final    Leukocytes, UA 02/10/2022 NEGATIVE  NEGATIVE Final    WBC Casts, UA 02/10/2022 NONE SEEN  < OR = 5 /HPF Final    RBC Casts, UA 02/10/2022 0-2  < OR = 2 /HPF Final    Squam Epithel, UA 02/10/2022 NONE SEEN  < OR = 5 /HPF Final    Bacteria, UA 02/10/2022 NONE SEEN  NONE SEEN /HPF Final    Hyaline Casts, UA 02/10/2022 NONE SEEN  NONE SEEN /LPF Final    Reflexive Urine Culture 02/10/2022    Final       History reviewed. No pertinent past medical history.  Social History     Socioeconomic History    Marital status:    Tobacco Use    Smoking status: Former Smoker    Smokeless tobacco: Never Used   Substance and Sexual Activity    Alcohol use: Yes    Drug use: Never     Social Determinants of Health     Financial Resource Strain: Unknown    Difficulty of Paying Living Expenses: Patient refused   Food Insecurity: Unknown    Worried About Running Out of Food in the Last Year: Patient refused    Ran Out of Food in the Last Year: Patient refused   Transportation Needs: Unknown    Lack of Transportation (Medical): Patient refused    Lack of Transportation (Non-Medical): Patient refused   Physical Activity: Sufficiently Active    Days of Exercise per Week: 5 days    Minutes of Exercise per Session: 30 min   Stress: No Stress Concern Present    Feeling of Stress : Only a little   Social Connections: Unknown    Frequency of Communication with Friends and Family: More than three times a week    Frequency of Social Gatherings with  Friends and Family: Twice a week    Active Member of Clubs or Organizations: Patient refused    Attends Club or Organization Meetings: Patient refused    Marital Status:    Housing Stability: Unknown    Unable to Pay for Housing in the Last Year: Patient refused    Unstable Housing in the Last Year: Patient refused     Past Surgical History:   Procedure Laterality Date    BREAST CYST ASPIRATION Left     left wrist      arthritis    TUBAL LIGATION       Family History   Problem Relation Age of Onset    Eczema Neg Hx     Lupus Neg Hx     Psoriasis Neg Hx     Melanoma Neg Hx        Review of patient's allergies indicates:  No Known Allergies    Current Outpatient Medications:     ascorbate calcium, vitamin C, 500 mg Tab, Vitamin C 500 mg tablet  Take 1 tablet every day by oral route., Disp: , Rfl:     calcium-vitamin D3 (OS-FERNANDA 500 + D3) 500 mg-5 mcg (200 unit) per tablet, Take 1 tablet by mouth 2 (two) times daily with meals., Disp: , Rfl:     magnesium 200 mg Tab, magnesium, Disp: , Rfl:     multivit-min-iron-FA-K.gin 18 mg iron-400 mcg-50 mg Tab, Take 1 tablet by mouth once daily. , Disp: , Rfl:     pantoprazole (PROTONIX) 40 MG tablet, Take 1 tablet (40 mg total) by mouth once daily., Disp: 90 tablet, Rfl: 0    rosuvastatin (CRESTOR) 5 MG tablet, Take 1 tablet (5 mg total) by mouth once daily., Disp: 90 tablet, Rfl: 1    sertraline (ZOLOFT) 25 MG tablet, Take 1 tablet (25 mg total) by mouth once daily., Disp: 90 tablet, Rfl: 1    Review of Systems   Constitutional: Negative for chills, fever and unexpected weight change.   HENT: Positive for rhinorrhea and sore throat. Negative for ear pain.    Respiratory: Positive for cough. Negative for shortness of breath.    Cardiovascular: Negative for chest pain, palpitations and leg swelling.   Gastrointestinal: Negative for abdominal pain, diarrhea, nausea and vomiting.   Genitourinary: Negative for difficulty urinating, hematuria and vaginal  "bleeding.   Musculoskeletal: Negative for arthralgias.        Knee pain   Skin: Negative for rash.   Neurological: Negative for dizziness, weakness and headaches.   Psychiatric/Behavioral: Negative for agitation and sleep disturbance. The patient is not nervous/anxious.           Objective:      Vitals:    03/03/22 1335   BP: 98/60   Pulse: 80   Weight: 52.6 kg (116 lb)   Height: 5' 2" (1.575 m)     Physical Exam  Constitutional:       Appearance: She is well-developed.   HENT:      Right Ear: External ear normal.      Left Ear: External ear normal.      Nose:      Right Turbinates: Pale.      Left Turbinates: Pale.      Right Sinus: Frontal sinus tenderness present.      Left Sinus: Frontal sinus tenderness present.      Mouth/Throat:      Pharynx: Posterior oropharyngeal erythema present.   Neck:      Vascular: No JVD.   Cardiovascular:      Rate and Rhythm: Normal rate and regular rhythm.      Heart sounds: No murmur heard.  Pulmonary:      Effort: Pulmonary effort is normal.      Breath sounds: Normal breath sounds.   Abdominal:      General: Bowel sounds are normal.      Palpations: Abdomen is soft.   Musculoskeletal:         General: No deformity. Normal range of motion.      Cervical back: Normal range of motion and neck supple.      Right knee: Crepitus present. No effusion. Normal range of motion.      Left knee: Effusion and crepitus present. Normal range of motion.   Lymphadenopathy:      Cervical: No cervical adenopathy.   Skin:     General: Skin is warm and dry.      Findings: No rash.   Neurological:      Mental Status: She is alert and oriented to person, place, and time.      Gait: Gait normal.   Psychiatric:         Speech: Speech normal.         Behavior: Behavior normal.           Assessment:       1. COVID-19 ruled out    2. Arthralgia, unspecified joint    3. Gastroesophageal reflux disease, unspecified whether esophagitis present    4. Hyperlipidemia, unspecified hyperlipidemia type    5. " Seasonal allergic rhinitis due to pollen    6. Primary osteoarthritis of both knees    7. Major depressive disorder with single episode, in remission         Plan:       COVID-19 ruled out  -     POCT COVID-19 Rapid Screening    Arthralgia, unspecified joint    Gastroesophageal reflux disease, unspecified whether esophagitis present    Hyperlipidemia, unspecified hyperlipidemia type    Seasonal allergic rhinitis due to pollen    Primary osteoarthritis of both knees    Major depressive disorder with single episode, in remission      Follow up in about 3 months (around 6/3/2022), or if symptoms worsen or fail to improve, for medication management.        3/6/2022 Bonnie Arita

## 2022-03-06 ENCOUNTER — PATIENT MESSAGE (OUTPATIENT)
Dept: FAMILY MEDICINE | Facility: CLINIC | Age: 72
End: 2022-03-06
Payer: MEDICARE

## 2022-03-07 RX ORDER — MELOXICAM 15 MG/1
15 TABLET ORAL DAILY
Qty: 30 TABLET | Refills: 0 | Status: SHIPPED | OUTPATIENT
Start: 2022-03-07 | End: 2022-04-13

## 2022-03-07 RX ORDER — METHYLPREDNISOLONE 4 MG/1
TABLET ORAL
Qty: 21 EACH | Refills: 0 | Status: SHIPPED | OUTPATIENT
Start: 2022-03-07 | End: 2022-03-28

## 2022-03-08 ENCOUNTER — TELEPHONE (OUTPATIENT)
Dept: FAMILY MEDICINE | Facility: CLINIC | Age: 72
End: 2022-03-08
Payer: MEDICARE

## 2022-03-08 DIAGNOSIS — M25.562 ACUTE PAIN OF LEFT KNEE: Primary | ICD-10-CM

## 2022-03-08 NOTE — TELEPHONE ENCOUNTER
----- Message from Adry Cherry sent at 3/8/2022 11:02 AM CST -----  Pt is calling to find out about an xray that was supposed to be ordered last week   [phone]

## 2022-03-09 ENCOUNTER — PATIENT MESSAGE (OUTPATIENT)
Dept: FAMILY MEDICINE | Facility: CLINIC | Age: 72
End: 2022-03-09
Payer: MEDICARE

## 2022-03-09 ENCOUNTER — HOSPITAL ENCOUNTER (OUTPATIENT)
Dept: RADIOLOGY | Facility: HOSPITAL | Age: 72
Discharge: HOME | End: 2022-03-09
Attending: NURSE PRACTITIONER
Payer: MEDICARE

## 2022-03-09 DIAGNOSIS — M25.562 ACUTE PAIN OF LEFT KNEE: ICD-10-CM

## 2022-03-09 PROCEDURE — 73562 X-RAY EXAM OF KNEE 3: CPT | Mod: TC,PO,LT

## 2022-03-10 NOTE — PROGRESS NOTES
Call patient.  Her x-rays of her left knee showed no fracture dislocation or advanced arthritis.  Minimal bone spurs seen.

## 2022-03-31 ENCOUNTER — PATIENT MESSAGE (OUTPATIENT)
Dept: FAMILY MEDICINE | Facility: CLINIC | Age: 72
End: 2022-03-31
Payer: MEDICARE

## 2022-04-12 ENCOUNTER — TELEPHONE (OUTPATIENT)
Dept: FAMILY MEDICINE | Facility: CLINIC | Age: 72
End: 2022-04-12

## 2022-04-12 NOTE — TELEPHONE ENCOUNTER
"Spoke with pt, states she noticed yesterday she has a small spot on her calf that is sore. Denies any redness or warmness to the area. Doesn't hurt when she walks but is just a little tender to touch, states she hasn't had any injury to the area or done anything differently. States every once in a while she thinks she may have swelling in her ankle as well but she isn't sure, states it may be her "imagination" isn't sure if this would be related to her recent knee issues she was seen for. No available appointments today.  "

## 2022-04-12 NOTE — TELEPHONE ENCOUNTER
----- Message from Alena Jackson sent at 4/12/2022 10:44 AM CDT -----  Pt states that she has swelling on her left leg around her ankle. Pt has a sore spot on her calf. Please advise. Pt #[phone]

## 2022-04-13 ENCOUNTER — OFFICE VISIT (OUTPATIENT)
Dept: FAMILY MEDICINE | Facility: CLINIC | Age: 72
End: 2022-04-13
Payer: MEDICARE

## 2022-04-13 ENCOUNTER — HOSPITAL ENCOUNTER (OUTPATIENT)
Dept: RADIOLOGY | Facility: HOSPITAL | Age: 72
Discharge: HOME | End: 2022-04-13
Attending: NURSE PRACTITIONER
Payer: MEDICARE

## 2022-04-13 ENCOUNTER — TELEPHONE (OUTPATIENT)
Dept: FAMILY MEDICINE | Facility: CLINIC | Age: 72
End: 2022-04-13

## 2022-04-13 VITALS
HEIGHT: 62 IN | HEART RATE: 68 BPM | DIASTOLIC BLOOD PRESSURE: 76 MMHG | BODY MASS INDEX: 21.16 KG/M2 | WEIGHT: 115 LBS | SYSTOLIC BLOOD PRESSURE: 108 MMHG

## 2022-04-13 DIAGNOSIS — F32.5 MAJOR DEPRESSIVE DISORDER WITH SINGLE EPISODE, IN REMISSION: ICD-10-CM

## 2022-04-13 DIAGNOSIS — M25.562 ACUTE PAIN OF LEFT KNEE: ICD-10-CM

## 2022-04-13 DIAGNOSIS — E78.5 DYSLIPIDEMIA: ICD-10-CM

## 2022-04-13 DIAGNOSIS — F41.9 ANXIETY: ICD-10-CM

## 2022-04-13 DIAGNOSIS — M79.605 PAIN IN LEFT LEG: Primary | ICD-10-CM

## 2022-04-13 DIAGNOSIS — M79.89 LEFT LEG SWELLING: ICD-10-CM

## 2022-04-13 DIAGNOSIS — M79.605 PAIN IN LEFT LEG: ICD-10-CM

## 2022-04-13 PROCEDURE — 3074F PR MOST RECENT SYSTOLIC BLOOD PRESSURE < 130 MM HG: ICD-10-PCS | Mod: S$GLB,,, | Performed by: NURSE PRACTITIONER

## 2022-04-13 PROCEDURE — 1126F AMNT PAIN NOTED NONE PRSNT: CPT | Mod: S$GLB,,, | Performed by: NURSE PRACTITIONER

## 2022-04-13 PROCEDURE — 1126F PR PAIN SEVERITY QUANTIFIED, NO PAIN PRESENT: ICD-10-PCS | Mod: S$GLB,,, | Performed by: NURSE PRACTITIONER

## 2022-04-13 PROCEDURE — 1160F RVW MEDS BY RX/DR IN RCRD: CPT | Mod: S$GLB,,, | Performed by: NURSE PRACTITIONER

## 2022-04-13 PROCEDURE — 1101F PR PT FALLS ASSESS DOC 0-1 FALLS W/OUT INJ PAST YR: ICD-10-PCS | Mod: S$GLB,,, | Performed by: NURSE PRACTITIONER

## 2022-04-13 PROCEDURE — 1159F PR MEDICATION LIST DOCUMENTED IN MEDICAL RECORD: ICD-10-PCS | Mod: S$GLB,,, | Performed by: NURSE PRACTITIONER

## 2022-04-13 PROCEDURE — 3008F BODY MASS INDEX DOCD: CPT | Mod: S$GLB,,, | Performed by: NURSE PRACTITIONER

## 2022-04-13 PROCEDURE — 3078F DIAST BP <80 MM HG: CPT | Mod: S$GLB,,, | Performed by: NURSE PRACTITIONER

## 2022-04-13 PROCEDURE — 99214 PR OFFICE/OUTPT VISIT, EST, LEVL IV, 30-39 MIN: ICD-10-PCS | Mod: S$GLB,,, | Performed by: NURSE PRACTITIONER

## 2022-04-13 PROCEDURE — 3288F PR FALLS RISK ASSESSMENT DOCUMENTED: ICD-10-PCS | Mod: S$GLB,,, | Performed by: NURSE PRACTITIONER

## 2022-04-13 PROCEDURE — 99214 OFFICE O/P EST MOD 30 MIN: CPT | Mod: S$GLB,,, | Performed by: NURSE PRACTITIONER

## 2022-04-13 PROCEDURE — 3078F PR MOST RECENT DIASTOLIC BLOOD PRESSURE < 80 MM HG: ICD-10-PCS | Mod: S$GLB,,, | Performed by: NURSE PRACTITIONER

## 2022-04-13 PROCEDURE — 3074F SYST BP LT 130 MM HG: CPT | Mod: S$GLB,,, | Performed by: NURSE PRACTITIONER

## 2022-04-13 PROCEDURE — 1101F PT FALLS ASSESS-DOCD LE1/YR: CPT | Mod: S$GLB,,, | Performed by: NURSE PRACTITIONER

## 2022-04-13 PROCEDURE — 3288F FALL RISK ASSESSMENT DOCD: CPT | Mod: S$GLB,,, | Performed by: NURSE PRACTITIONER

## 2022-04-13 PROCEDURE — 3008F PR BODY MASS INDEX (BMI) DOCUMENTED: ICD-10-PCS | Mod: S$GLB,,, | Performed by: NURSE PRACTITIONER

## 2022-04-13 PROCEDURE — 1160F PR REVIEW ALL MEDS BY PRESCRIBER/CLIN PHARMACIST DOCUMENTED: ICD-10-PCS | Mod: S$GLB,,, | Performed by: NURSE PRACTITIONER

## 2022-04-13 PROCEDURE — 93971 EXTREMITY STUDY: CPT | Mod: TC,LT

## 2022-04-13 PROCEDURE — 1159F MED LIST DOCD IN RCRD: CPT | Mod: S$GLB,,, | Performed by: NURSE PRACTITIONER

## 2022-04-13 NOTE — TELEPHONE ENCOUNTER
----- Message from Bonnie Arita NP sent at 4/13/2022 12:26 PM CDT -----  Negative dvt. Elevate. Ok to use voltaren gel tid.

## 2022-04-18 NOTE — PROGRESS NOTES
SUBJECTIVE:    Patient ID: Sylvia Maxwell is a 71 y.o. female.    Chief Complaint: Leg Pain (Left calf pain starting on Monday // did not bring bottles // ac )    71 year old female presents for urgent visit. Patient reports has been experiencing pain to calf area. Did notice some swelling. No injury or trauma. Does have some knee pain as well. Has not taken anything. No history of blood clots.       Office Visit on 03/03/2022   Component Date Value Ref Range Status    POC Rapid COVID 03/03/2022 Negative  Negative Final     Acceptable 03/03/2022 Yes   Final   Office Visit on 01/20/2022   Component Date Value Ref Range Status    WBC 02/10/2022 4.6  3.8 - 10.8 Thousand/uL Final    RBC 02/10/2022 3.83  3.80 - 5.10 Million/uL Final    Hemoglobin 02/10/2022 12.4  11.7 - 15.5 g/dL Final    Hematocrit 02/10/2022 36.1  35.0 - 45.0 % Final    MCV 02/10/2022 94.3  80.0 - 100.0 fL Final    MCH 02/10/2022 32.4  27.0 - 33.0 pg Final    MCHC 02/10/2022 34.3  32.0 - 36.0 g/dL Final    RDW 02/10/2022 11.7  11.0 - 15.0 % Final    Platelets 02/10/2022 207  140 - 400 Thousand/uL Final    MPV 02/10/2022 11.8  7.5 - 12.5 fL Final    Neutrophils, Abs 02/10/2022 2,613  1,500 - 7,800 cells/uL Final    Lymph # 02/10/2022 1,274  850 - 3,900 cells/uL Final    Mono # 02/10/2022 570  200 - 950 cells/uL Final    Eos # 02/10/2022 32  15 - 500 cells/uL Final    Baso # 02/10/2022 110  0 - 200 cells/uL Final    Neutrophils Relative 02/10/2022 56.8  % Final    Lymph % 02/10/2022 27.7  % Final    Mono % 02/10/2022 12.4  % Final    Eosinophil % 02/10/2022 0.7  % Final    Basophil % 02/10/2022 2.4  % Final    Glucose 02/10/2022 92  65 - 99 mg/dL Final    BUN 02/10/2022 25  7 - 25 mg/dL Final    Creatinine 02/10/2022 0.52 (A) 0.60 - 0.93 mg/dL Final    eGFR if non African American 02/10/2022 97  > OR = 60 mL/min/1.73m2 Final    eGFR if  02/10/2022 112  > OR = 60 mL/min/1.73m2 Final     BUN/Creatinine Ratio 02/10/2022 48 (A) 6 - 22 (calc) Final    Sodium 02/10/2022 138  135 - 146 mmol/L Final    Potassium 02/10/2022 4.1  3.5 - 5.3 mmol/L Final    Chloride 02/10/2022 102  98 - 110 mmol/L Final    CO2 02/10/2022 29  20 - 32 mmol/L Final    Calcium 02/10/2022 9.4  8.6 - 10.4 mg/dL Final    Total Protein 02/10/2022 6.8  6.1 - 8.1 g/dL Final    Albumin 02/10/2022 4.4  3.6 - 5.1 g/dL Final    Globulin, Total 02/10/2022 2.4  1.9 - 3.7 g/dL (calc) Final    Albumin/Globulin Ratio 02/10/2022 1.8  1.0 - 2.5 (calc) Final    Total Bilirubin 02/10/2022 1.2  0.2 - 1.2 mg/dL Final    Alkaline Phosphatase 02/10/2022 59  37 - 153 U/L Final    AST 02/10/2022 23  10 - 35 U/L Final    ALT 02/10/2022 22  6 - 29 U/L Final    Cholesterol 02/10/2022 171  <200 mg/dL Final    HDL 02/10/2022 75  > OR = 50 mg/dL Final    Triglycerides 02/10/2022 63  <150 mg/dL Final    LDL Cholesterol 02/10/2022 82  mg/dL (calc) Final    HDL/Cholesterol Ratio 02/10/2022 2.3  <5.0 (calc) Final    Non HDL Chol. (LDL+VLDL) 02/10/2022 96  <130 mg/dL (calc) Final    TSH w/reflex to FT4 02/10/2022 1.42  0.40 - 4.50 mIU/L Final    Creatinine, Urine 02/10/2022 185  20 - 275 mg/dL Final    Microalb, Ur 02/10/2022 1.9  See Note: mg/dL Final    Microalb/Creat Ratio 02/10/2022 10  <30 mcg/mg creat Final    Color, UA 02/10/2022 DARK YELLOW  YELLOW Final    Appearance, UA 02/10/2022 CLEAR  CLEAR Final    Specific [place], UA 02/10/2022 1.026  1.001 - 1.035 Final    pH, UA 02/10/2022 6.0  5.0 - 8.0 Final    Glucose, UA 02/10/2022 NEGATIVE  NEGATIVE Final    Bilirubin, UA 02/10/2022 NEGATIVE  NEGATIVE Final    Ketones, UA 02/10/2022 TRACE (A) NEGATIVE Final    Occult Blood UA 02/10/2022 NEGATIVE  NEGATIVE Final    Protein, UA 02/10/2022 NEGATIVE  NEGATIVE Final    Nitrite, UA 02/10/2022 NEGATIVE  NEGATIVE Final    Leukocytes, UA 02/10/2022 NEGATIVE  NEGATIVE Final    WBC Casts, UA 02/10/2022 NONE SEEN  < OR = 5 /HPF Final     RBC Casts, UA 02/10/2022 0-2  < OR = 2 /HPF Final    Squam Epithel, UA 02/10/2022 NONE SEEN  < OR = 5 /HPF Final    Bacteria, UA 02/10/2022 NONE SEEN  NONE SEEN /HPF Final    Hyaline Casts, UA 02/10/2022 NONE SEEN  NONE SEEN /LPF Final    Reflexive Urine Culture 02/10/2022    Final       History reviewed. No pertinent past medical history.  Social History     Socioeconomic History    Marital status:    Tobacco Use    Smoking status: Former Smoker    Smokeless tobacco: Never Used   Substance and Sexual Activity    Alcohol use: Yes    Drug use: Never     Social Determinants of Health     Financial Resource Strain: Unknown    Difficulty of Paying Living Expenses: Patient refused   Food Insecurity: Unknown    Worried About Running Out of Food in the Last Year: Patient refused    Ran Out of Food in the Last Year: Patient refused   Transportation Needs: Unknown    Lack of Transportation (Medical): Patient refused    Lack of Transportation (Non-Medical): Patient refused   Physical Activity: Sufficiently Active    Days of Exercise per Week: 5 days    Minutes of Exercise per Session: 30 min   Stress: No Stress Concern Present    Feeling of Stress : Only a little   Social Connections: Unknown    Frequency of Communication with Friends and Family: More than three times a week    Frequency of Social Gatherings with Friends and Family: Twice a week    Active Member of Clubs or Organizations: Patient refused    Attends Club or Organization Meetings: Patient refused    Marital Status:    Housing Stability: Unknown    Unable to Pay for Housing in the Last Year: Patient refused    Number of Places Lived in the Last Year: 1    Unstable Housing in the Last Year: No     Past Surgical History:   Procedure Laterality Date    BREAST CYST ASPIRATION Left     left wrist      arthritis    TUBAL LIGATION       Family History   Problem Relation Age of Onset    Eczema Neg Hx     Lupus Neg Hx      "Psoriasis Neg Hx     Melanoma Neg Hx        Review of patient's allergies indicates:  No Known Allergies    Current Outpatient Medications:     ascorbate calcium, vitamin C, 500 mg Tab, Vitamin C 500 mg tablet  Take 1 tablet every day by oral route., Disp: , Rfl:     calcium-vitamin D3 (OS-FERNANDA 500 + D3) 500 mg-5 mcg (200 unit) per tablet, Take 1 tablet by mouth 2 (two) times daily with meals., Disp: , Rfl:     magnesium 200 mg Tab, magnesium, Disp: , Rfl:     multivit-min-iron-FA-K.gin 18 mg iron-400 mcg-50 mg Tab, Take 1 tablet by mouth once daily. , Disp: , Rfl:     pantoprazole (PROTONIX) 40 MG tablet, Take 1 tablet (40 mg total) by mouth once daily., Disp: 90 tablet, Rfl: 0    rosuvastatin (CRESTOR) 5 MG tablet, Take 1 tablet (5 mg total) by mouth once daily., Disp: 90 tablet, Rfl: 1    sertraline (ZOLOFT) 25 MG tablet, Take 1 tablet (25 mg total) by mouth once daily., Disp: 90 tablet, Rfl: 1    Review of Systems   Constitutional: Negative for chills, fever and unexpected weight change.   HENT: Negative for sore throat.    Respiratory: Negative for cough and shortness of breath.    Cardiovascular: Negative for chest pain, palpitations and leg swelling.   Gastrointestinal: Negative for abdominal pain, diarrhea, nausea and vomiting.   Musculoskeletal: Negative for arthralgias.        Leg pain   Skin: Negative for rash.   Neurological: Negative for dizziness, weakness and headaches.   Psychiatric/Behavioral: Negative for agitation and sleep disturbance. The patient is not nervous/anxious.           Objective:      Vitals:    04/13/22 0811   BP: 108/76   Pulse: 68   Weight: 52.2 kg (115 lb)   Height: 5' 2" (1.575 m)     Physical Exam  Vitals and nursing note reviewed.   Constitutional:       General: She is not in acute distress.     Appearance: Normal appearance. She is well-developed. She is not ill-appearing.   HENT:      Head: Normocephalic.   Neck:      Vascular: No JVD.   Cardiovascular:      Rate and " Rhythm: Normal rate and regular rhythm.      Heart sounds: No murmur heard.  Pulmonary:      Effort: Pulmonary effort is normal.      Breath sounds: Normal breath sounds.   Musculoskeletal:         General: No deformity. Normal range of motion.      Left knee: Crepitus present.      Left lower leg: Swelling present. No edema.        Legs:       Comments: Mild palpation tenderness. No erythema. Minimal swelling.negative homans   Lymphadenopathy:      Cervical: No cervical adenopathy.   Skin:     General: Skin is warm and dry.      Findings: No rash.   Neurological:      Mental Status: She is alert and oriented to person, place, and time.      Gait: Gait normal.   Psychiatric:         Speech: Speech normal.           Assessment:       1. Pain in left leg    2. Left leg swelling    3. Major depressive disorder with single episode, in remission    4. Dyslipidemia    5. Anxiety    6. Acute pain of left knee         Plan:       Pain in left leg  -     US Lower Extremity Veins Left; Future    Left leg swelling  -     US Lower Extremity Veins Left; Future    Major depressive disorder with single episode, in remission    Dyslipidemia    Anxiety    Acute pain of left knee    Will send for ultrasound. Do not think blood clot. If negative will treat as muscle strain.     Follow up in about 3 months (around 7/13/2022), or if symptoms worsen or fail to improve, for medication management.        4/18/2022 Bonnie Arita

## 2022-04-19 ENCOUNTER — PATIENT MESSAGE (OUTPATIENT)
Dept: FAMILY MEDICINE | Facility: CLINIC | Age: 72
End: 2022-04-19

## 2022-04-20 RX ORDER — PANTOPRAZOLE SODIUM 40 MG/1
40 TABLET, DELAYED RELEASE ORAL DAILY
Qty: 90 TABLET | Refills: 1 | Status: SHIPPED | OUTPATIENT
Start: 2022-04-20 | End: 2022-10-18 | Stop reason: SDUPTHER

## 2022-04-28 ENCOUNTER — PATIENT MESSAGE (OUTPATIENT)
Dept: FAMILY MEDICINE | Facility: CLINIC | Age: 72
End: 2022-04-28

## 2022-04-29 ENCOUNTER — PATIENT MESSAGE (OUTPATIENT)
Dept: FAMILY MEDICINE | Facility: CLINIC | Age: 72
End: 2022-04-29

## 2022-04-29 DIAGNOSIS — M25.473 ANKLE SWELLING, UNSPECIFIED LATERALITY: Primary | ICD-10-CM

## 2022-04-29 RX ORDER — METHYLPREDNISOLONE 4 MG/1
TABLET ORAL
Qty: 21 EACH | Refills: 0 | Status: SHIPPED | OUTPATIENT
Start: 2022-04-29 | End: 2022-05-20

## 2022-04-29 NOTE — TELEPHONE ENCOUNTER
Negative for dvt. I dont mind sending in medrol taper. It does look like she placed a referral for Dr. Gallardo as well

## 2022-05-02 ENCOUNTER — OFFICE VISIT (OUTPATIENT)
Dept: ORTHOPEDICS | Facility: CLINIC | Age: 72
End: 2022-05-02
Payer: MEDICARE

## 2022-05-02 VITALS — BODY MASS INDEX: 20.02 KG/M2 | WEIGHT: 113 LBS | HEIGHT: 63 IN

## 2022-05-02 DIAGNOSIS — M47.816 LUMBAR SPONDYLOSIS: ICD-10-CM

## 2022-05-02 DIAGNOSIS — M22.41 CHONDROMALACIA OF BOTH PATELLAE: ICD-10-CM

## 2022-05-02 DIAGNOSIS — M22.42 CHONDROMALACIA OF BOTH PATELLAE: ICD-10-CM

## 2022-05-02 DIAGNOSIS — M17.0 PRIMARY OSTEOARTHRITIS OF BOTH KNEES: ICD-10-CM

## 2022-05-02 DIAGNOSIS — M47.816 FACET ARTHRITIS OF LUMBAR REGION: Primary | ICD-10-CM

## 2022-05-02 PROCEDURE — 3288F FALL RISK ASSESSMENT DOCD: CPT | Mod: S$GLB,,, | Performed by: ORTHOPAEDIC SURGERY

## 2022-05-02 PROCEDURE — 1159F PR MEDICATION LIST DOCUMENTED IN MEDICAL RECORD: ICD-10-PCS | Mod: S$GLB,,, | Performed by: ORTHOPAEDIC SURGERY

## 2022-05-02 PROCEDURE — 1101F PT FALLS ASSESS-DOCD LE1/YR: CPT | Mod: S$GLB,,, | Performed by: ORTHOPAEDIC SURGERY

## 2022-05-02 PROCEDURE — 1125F PR PAIN SEVERITY QUANTIFIED, PAIN PRESENT: ICD-10-PCS | Mod: S$GLB,,, | Performed by: ORTHOPAEDIC SURGERY

## 2022-05-02 PROCEDURE — 1160F RVW MEDS BY RX/DR IN RCRD: CPT | Mod: S$GLB,,, | Performed by: ORTHOPAEDIC SURGERY

## 2022-05-02 PROCEDURE — 99203 OFFICE O/P NEW LOW 30 MIN: CPT | Mod: S$GLB,,, | Performed by: ORTHOPAEDIC SURGERY

## 2022-05-02 PROCEDURE — 1159F MED LIST DOCD IN RCRD: CPT | Mod: S$GLB,,, | Performed by: ORTHOPAEDIC SURGERY

## 2022-05-02 PROCEDURE — 3008F PR BODY MASS INDEX (BMI) DOCUMENTED: ICD-10-PCS | Mod: S$GLB,,, | Performed by: ORTHOPAEDIC SURGERY

## 2022-05-02 PROCEDURE — 3288F PR FALLS RISK ASSESSMENT DOCUMENTED: ICD-10-PCS | Mod: S$GLB,,, | Performed by: ORTHOPAEDIC SURGERY

## 2022-05-02 PROCEDURE — 1160F PR REVIEW ALL MEDS BY PRESCRIBER/CLIN PHARMACIST DOCUMENTED: ICD-10-PCS | Mod: S$GLB,,, | Performed by: ORTHOPAEDIC SURGERY

## 2022-05-02 PROCEDURE — 1101F PR PT FALLS ASSESS DOC 0-1 FALLS W/OUT INJ PAST YR: ICD-10-PCS | Mod: S$GLB,,, | Performed by: ORTHOPAEDIC SURGERY

## 2022-05-02 PROCEDURE — 99203 PR OFFICE/OUTPT VISIT, NEW, LEVL III, 30-44 MIN: ICD-10-PCS | Mod: S$GLB,,, | Performed by: ORTHOPAEDIC SURGERY

## 2022-05-02 PROCEDURE — 1125F AMNT PAIN NOTED PAIN PRSNT: CPT | Mod: S$GLB,,, | Performed by: ORTHOPAEDIC SURGERY

## 2022-05-02 PROCEDURE — 3008F BODY MASS INDEX DOCD: CPT | Mod: S$GLB,,, | Performed by: ORTHOPAEDIC SURGERY

## 2022-05-02 NOTE — PROGRESS NOTES
Subjective:       Patient ID: Sylvia Maxwell is a 71 y.o. female.    Chief Complaint: Pain of the Lumbar Spine (Lumbar pain for about 2 weeks, no injury known. States she had a history of previous pain about a year ago. States she has intermittent radiating pain down into right thigh. Denies numbness/tingling. Back bothers her more. ) and Pain of the Right Knee (RT knee pain, states she has soreness when climbing stairs, extended walking/standing, stooping. No tx.)      History of Present Illness    Prior to meeting with the patient I reviewed the medical chart in [facility]. This included reviewing the previous progress notes from our office, review of the patient's last appointment with their primary care provider, review of any visits to the emergency room, and review of any pain management appointments or procedures.   71 year-old lady with acute lumbar pain 2 weeks but has a history of chronic back pain dating back a year ago.  Does have some intermittent radiation of pain to her thigh but not below no bowel or bladder dysfunction also has complaints of pain specifically in the with soreness arising and bending her knee  Denies trauma to lumbar spine.  Denies trauma to either knee.  Left knee is more painful back pain is worse than knee pain.  Current Medications  Current Outpatient Medications   Medication Sig Dispense Refill    ascorbate calcium, vitamin C, 500 mg Tab Vitamin C 500 mg tablet   Take 1 tablet every day by oral route.      calcium-vitamin D3 (OS-FERNANDA 500 + D3) 500 mg-5 mcg (200 unit) per tablet Take 1 tablet by mouth 2 (two) times daily with meals.      magnesium 200 mg Tab magnesium      multivit-min-iron-FA-K.gin 18 mg iron-400 mcg-50 mg Tab Take 1 tablet by mouth once daily.       pantoprazole (PROTONIX) 40 MG tablet Take 1 tablet (40 mg total) by mouth once daily. 90 tablet 1    rosuvastatin (CRESTOR) 5 MG tablet Take 1 tablet (5 mg total) by mouth once daily. 90 tablet 1    sertraline  (ZOLOFT) 25 MG tablet Take 1 tablet (25 mg total) by mouth once daily. 90 tablet 1    methylPREDNISolone (MEDROL DOSEPACK) 4 mg tablet use as directed (Patient not taking: Reported on 5/2/2022) 21 each 0     No current facility-administered medications for this visit.       Allergies  Review of patient's allergies indicates:  No Known Allergies    Past Medical History  History reviewed. No pertinent past medical history.    Surgical History  Past Surgical History:   Procedure Laterality Date    BREAST CYST ASPIRATION Left     left wrist      arthritis    TUBAL LIGATION         Family History:   Family History   Problem Relation Age of Onset    Eczema Neg Hx     Lupus Neg Hx     Psoriasis Neg Hx     Melanoma Neg Hx        Social History:   Social History     Socioeconomic History    Marital status:    Tobacco Use    Smoking status: Former Smoker    Smokeless tobacco: Never Used   Substance and Sexual Activity    Alcohol use: Yes    Drug use: Never     Social Determinants of Health     Financial Resource Strain: Unknown    Difficulty of Paying Living Expenses: Patient refused   Food Insecurity: Unknown    Worried About Running Out of Food in the Last Year: Patient refused    Ran Out of Food in the Last Year: Patient refused   Transportation Needs: Unknown    Lack of Transportation (Medical): Patient refused    Lack of Transportation (Non-Medical): Patient refused   Physical Activity: Sufficiently Active    Days of Exercise per Week: 5 days    Minutes of Exercise per Session: 30 min   Stress: No Stress Concern Present    Feeling of Stress : Only a little   Social Connections: Unknown    Frequency of Communication with Friends and Family: More than three times a week    Frequency of Social Gatherings with Friends and Family: Twice a week    Active Member of Clubs or Organizations: Patient refused    Attends Club or Organization Meetings: Patient refused    Marital Status:     Housing Stability: Unknown    Unable to Pay for Housing in the Last Year: Patient refused    Number of Places Lived in the Last Year: 1    Unstable Housing in the Last Year: No       Hospitalization/Major Diagnostic Procedure:     Review of Systems     General/Constitutional:  Chills denies. Fatigue denies. Fever denies. Weight gain denies. Weight loss denies.    Respiratory:  Shortness of breath denies.    Cardiovascular:  Chest pain denies.    Gastrointestinal:  Constipation denies. Diarrhea denies. Nausea denies. Vomiting denies.     Hematology:  Easy bruising denies. Prolonged bleeding denies.     Genitourinary:  Frequent urination denies. Pain in lower back denies. Painful urination denies.     Musculoskeletal:  See HPI for details    Skin:  Rash denies.    Neurologic:  Dizziness denies. Gait abnormalities denies. Seizures denies. Tingling/Numbess denies.    Psychiatric:  Anxiety denies. Depressed mood denies.     Objective:   Vital Signs: There were no vitals filed for this visit.     Physical Exam      General Examination:     Constitutional: The patient is alert and oriented to lace person and time. Mood is pleasant.     Head/Face: Normal facial features normal eyebrows    Eyes: Normal extraocular motion bilaterally    Lungs: Respirations are equal and unlabored    Gait is coordinated.    Cardiovascular: There are no swelling or varicosities present.    Lymphatic: Negative for adenopathy    Skin: Normal    Neurological: Level of consciousness normal. Oriented to place person and time and situation    Psychiatric: Oriented to time place person and situation    Left and right knees show no swelling there is patellofemoral crepitus with active extension of both knees left being greater than right.  Left knee shows some mild tenderness along the lateral joint line Aleksandra's test is equivocal.  Aleksandra's test negative on the right.  No swelling in either knee.  Lumbar exam tenderness at the midline to  lumbosacral junction no spasm range of motion mildly restricted due to pain pain with extension straight-leg-raising negative  XRAY Report/ Interpretation :  AP pelvis x-ray was taken today. Indications low back pain and/or hip pain. Findings AP pelvis x-ray appears to be normal with no evidence of fractures or significant degenerative disease    AP lateral x-rays and sunrise x-ray of the right knee were taken and reviewed.  Findings:  Minimal degenerative changes noted    AP and lateral x-rays of lumbar spine will performed today. Indications low back pain. Findings:  Moderate facet joint arthritis multiple levels lower lumbar spine mild scoliosis mild disc space narrowing L4-5 with early degenerative spondylolisthesis    Prior x-rays of the left knee three views performed on March 8, 2022 were reviewed AP lateral oblique x-rays show minimal arthritis changes no fractures      Assessment:       1. Facet arthritis of lumbar region    2. Chondromalacia of both patellae    3. Primary osteoarthritis of both knees    4. Lumbar spondylosis        Plan:       Sylvia was seen today for pain and pain.    Diagnoses and all orders for this visit:    Facet arthritis of lumbar region  -     X-Ray Lumbar Spine Ap And Lateral  -     X-Ray Pelvis Routine AP    Chondromalacia of both patellae  -     Cancel: X-Ray Knee 1 or 2 View Right  -     X-Ray Knee 3 View Right    Primary osteoarthritis of both knees    Lumbar spondylosis         No follow-ups on file.    Treatment options were discussed she does not feel that either her back pain or knee pain is severe enough to have an MRI and possible referral to pain management for medial branch blocks of the lumbar spine or injections versus arthroscopic surgery of the left knee.  She would rather be observed and try and exercise program.    Lumbar exercise program was administered to the patient.    Patient was advised that if the exercises do not resolve her symptoms that she should  have an MRI of the lumbar spine.  She has history of claustrophobia.  I have explained to her that if we ordered an MRI will also order oral sedation and refer her to [facility] which has a large bore MRI machine  I also discussed the possibility of referring her to [facility] for an open MRI if she prefers    She agrees with this treatment plan    Treatment options were discussed with regards to the nature of the medical condition. Conservative pain intervention and surgical options were discussed in detail. The probability of success of each separate treatment option was discussed. The patient expressed a clear understanding of the treatment options. With regards to surgery, the procedure risk, benefits, complications, and outcomes were discussed. No guarantees were given with regards to surgical outcome.   The risk of complications, morbidity, and mortality of patient management decisions have been made at the time of this visit. These are associated with the patient's problems, diagnostic procedures and treatment options. This includes the possible management options selected and those considered but not selected by the patient after shared medical decision making we discussed with the patient.   This note was created using Dragon voice recognition software that occasionally misinterpreted phrases or words.

## 2022-05-17 ENCOUNTER — PATIENT MESSAGE (OUTPATIENT)
Dept: FAMILY MEDICINE | Facility: CLINIC | Age: 72
End: 2022-05-17

## 2022-05-17 DIAGNOSIS — F32.5 MAJOR DEPRESSIVE DISORDER WITH SINGLE EPISODE, IN REMISSION: ICD-10-CM

## 2022-05-17 RX ORDER — SERTRALINE HYDROCHLORIDE 25 MG/1
25 TABLET, FILM COATED ORAL DAILY
Qty: 90 TABLET | Refills: 0 | Status: SHIPPED | OUTPATIENT
Start: 2022-05-17 | End: 2022-07-25 | Stop reason: SDUPTHER

## 2022-06-16 ENCOUNTER — PATIENT MESSAGE (OUTPATIENT)
Dept: FAMILY MEDICINE | Facility: CLINIC | Age: 72
End: 2022-06-16

## 2022-06-16 DIAGNOSIS — E78.5 HYPERLIPIDEMIA, UNSPECIFIED HYPERLIPIDEMIA TYPE: ICD-10-CM

## 2022-06-16 RX ORDER — ROSUVASTATIN CALCIUM 5 MG/1
5 TABLET, COATED ORAL DAILY
Qty: 90 TABLET | Refills: 1 | Status: SHIPPED | OUTPATIENT
Start: 2022-06-16 | End: 2022-10-18 | Stop reason: SDUPTHER

## 2022-06-23 ENCOUNTER — TELEPHONE (OUTPATIENT)
Dept: FAMILY MEDICINE | Facility: CLINIC | Age: 72
End: 2022-06-23

## 2022-06-23 ENCOUNTER — OFFICE VISIT (OUTPATIENT)
Dept: FAMILY MEDICINE | Facility: CLINIC | Age: 72
End: 2022-06-23
Payer: MEDICARE

## 2022-06-23 VITALS
HEART RATE: 77 BPM | SYSTOLIC BLOOD PRESSURE: 110 MMHG | HEIGHT: 63 IN | BODY MASS INDEX: 20.2 KG/M2 | OXYGEN SATURATION: 98 % | WEIGHT: 114 LBS | DIASTOLIC BLOOD PRESSURE: 64 MMHG

## 2022-06-23 DIAGNOSIS — R10.13 EPIGASTRIC PAIN: Primary | ICD-10-CM

## 2022-06-23 PROCEDURE — 1160F RVW MEDS BY RX/DR IN RCRD: CPT | Mod: CPTII,S$GLB,, | Performed by: NURSE PRACTITIONER

## 2022-06-23 PROCEDURE — 99213 PR OFFICE/OUTPT VISIT, EST, LEVL III, 20-29 MIN: ICD-10-PCS | Mod: S$GLB,,, | Performed by: NURSE PRACTITIONER

## 2022-06-23 PROCEDURE — 1159F MED LIST DOCD IN RCRD: CPT | Mod: CPTII,S$GLB,, | Performed by: NURSE PRACTITIONER

## 2022-06-23 PROCEDURE — 1101F PT FALLS ASSESS-DOCD LE1/YR: CPT | Mod: CPTII,S$GLB,, | Performed by: NURSE PRACTITIONER

## 2022-06-23 PROCEDURE — 3074F SYST BP LT 130 MM HG: CPT | Mod: CPTII,S$GLB,, | Performed by: NURSE PRACTITIONER

## 2022-06-23 PROCEDURE — 99213 OFFICE O/P EST LOW 20 MIN: CPT | Mod: S$GLB,,, | Performed by: NURSE PRACTITIONER

## 2022-06-23 PROCEDURE — 3288F PR FALLS RISK ASSESSMENT DOCUMENTED: ICD-10-PCS | Mod: CPTII,S$GLB,, | Performed by: NURSE PRACTITIONER

## 2022-06-23 PROCEDURE — 3078F DIAST BP <80 MM HG: CPT | Mod: CPTII,S$GLB,, | Performed by: NURSE PRACTITIONER

## 2022-06-23 PROCEDURE — 3288F FALL RISK ASSESSMENT DOCD: CPT | Mod: CPTII,S$GLB,, | Performed by: NURSE PRACTITIONER

## 2022-06-23 PROCEDURE — 3008F PR BODY MASS INDEX (BMI) DOCUMENTED: ICD-10-PCS | Mod: CPTII,S$GLB,, | Performed by: NURSE PRACTITIONER

## 2022-06-23 PROCEDURE — 1101F PR PT FALLS ASSESS DOC 0-1 FALLS W/OUT INJ PAST YR: ICD-10-PCS | Mod: CPTII,S$GLB,, | Performed by: NURSE PRACTITIONER

## 2022-06-23 PROCEDURE — 3008F BODY MASS INDEX DOCD: CPT | Mod: CPTII,S$GLB,, | Performed by: NURSE PRACTITIONER

## 2022-06-23 PROCEDURE — 3078F PR MOST RECENT DIASTOLIC BLOOD PRESSURE < 80 MM HG: ICD-10-PCS | Mod: CPTII,S$GLB,, | Performed by: NURSE PRACTITIONER

## 2022-06-23 PROCEDURE — 3074F PR MOST RECENT SYSTOLIC BLOOD PRESSURE < 130 MM HG: ICD-10-PCS | Mod: CPTII,S$GLB,, | Performed by: NURSE PRACTITIONER

## 2022-06-23 PROCEDURE — 1160F PR REVIEW ALL MEDS BY PRESCRIBER/CLIN PHARMACIST DOCUMENTED: ICD-10-PCS | Mod: CPTII,S$GLB,, | Performed by: NURSE PRACTITIONER

## 2022-06-23 PROCEDURE — 1159F PR MEDICATION LIST DOCUMENTED IN MEDICAL RECORD: ICD-10-PCS | Mod: CPTII,S$GLB,, | Performed by: NURSE PRACTITIONER

## 2022-06-23 RX ORDER — FAMOTIDINE 20 MG/1
20 TABLET, FILM COATED ORAL 2 TIMES DAILY
Qty: 60 TABLET | Refills: 0 | Status: SHIPPED | OUTPATIENT
Start: 2022-06-23 | End: 2022-10-18

## 2022-06-23 NOTE — PROGRESS NOTES
" Patient ID: Sylvia Maxwell is a 71 y.o. female.    Chief Complaint: Abdominal Pain (No bottles//Pt c/o mid abdominal pain x 1-2 months. Pt states she notice the discomfort/unsettling feeling after eating. BM are regular, but not the "same".//[place])    Pt here for sick visit- reports has been having intermittent epigastric pain off/on for the past month. Seems to occur more often after she eats. Describes pain as mild, Will last 30 minutes or so then resolve. Felt the pain this am when she was reaching over her bathtub to clean however. No bulging to abd wall. Reports occas she will feel slightly queasy but denies any severe nausea and no vomiting. No change in appetite and no weight loss. Has been on PPI for years, last EGD was 2017 with Dr. Lewis, was told she had HH  Pt admits has been under a lot of stress d/t 's death in Feb          History reviewed. No pertinent past medical history.  Past Surgical History:   Procedure Laterality Date    BREAST CYST ASPIRATION Left     left wrist      arthritis    TUBAL LIGATION           Tobacco History:  reports that she has quit smoking. She has never used smokeless tobacco.      Review of patient's allergies indicates:  No Known Allergies    Current Outpatient Medications:     ascorbate calcium, vitamin C, 500 mg Tab, Vitamin C 500 mg tablet  Take 1 tablet every day by oral route., Disp: , Rfl:     calcium-vitamin D3 (OS-FERNANDA 500 + D3) 500 mg-5 mcg (200 unit) per tablet, Take 1 tablet by mouth 2 (two) times daily with meals., Disp: , Rfl:     famotidine (PEPCID) 20 MG tablet, Take 1 tablet (20 mg total) by mouth 2 (two) times daily., Disp: 60 tablet, Rfl: 0    magnesium 200 mg Tab, magnesium, Disp: , Rfl:     multivit-min-iron-FA-K.gin 18 mg iron-400 mcg-50 mg Tab, Take 1 tablet by mouth once daily. , Disp: , Rfl:     pantoprazole (PROTONIX) 40 MG tablet, Take 1 tablet (40 mg total) by mouth once daily., Disp: 90 tablet, Rfl: 1    rosuvastatin (CRESTOR) 5 " "MG tablet, Take 1 tablet (5 mg total) by mouth once daily., Disp: 90 tablet, Rfl: 1    sertraline (ZOLOFT) 25 MG tablet, Take 1 tablet (25 mg total) by mouth once daily., Disp: 90 tablet, Rfl: 0    Review of Systems   Constitutional: Negative for appetite change, chills, fever and unexpected weight change.   HENT: Negative for sore throat and trouble swallowing.    Respiratory: Negative for cough and shortness of breath.    Cardiovascular: Negative for chest pain and palpitations.   Gastrointestinal: Positive for abdominal pain. Negative for blood in stool, constipation, diarrhea, nausea and vomiting.   Genitourinary: Negative for dysuria, flank pain and hematuria.   Neurological: Negative for dizziness.   Psychiatric/Behavioral: The patient is nervous/anxious.           Objective:      Vitals:    06/23/22 1536   BP: 110/64   Pulse: 77   SpO2: 98%   Weight: 51.7 kg (114 lb)   Height: 5' 3" (1.6 m)     Physical Exam  Constitutional:       General: She is not in acute distress.     Appearance: Normal appearance. She is normal weight.   Cardiovascular:      Rate and Rhythm: Normal rate and regular rhythm.      Heart sounds: No murmur heard.  Pulmonary:      Effort: Pulmonary effort is normal. No respiratory distress.      Breath sounds: Normal breath sounds.   Abdominal:      General: There is no distension.      Palpations: Abdomen is soft. There is no mass.      Tenderness: There is no abdominal tenderness. There is no guarding or rebound.      Hernia: No hernia is present.   Musculoskeletal:      Cervical back: Neck supple.      Right lower leg: No edema.      Left lower leg: No edema.   Lymphadenopathy:      Cervical: No cervical adenopathy.   Skin:     General: Skin is warm and dry.   Neurological:      General: No focal deficit present.      Mental Status: She is alert and oriented to person, place, and time.           Assessment:       1. Epigastric pain           Plan:       Epigastric pain  Comments:  pt " c/oing of mild intermittent epigastric pain, no red flags. will send for GB US and add H2 blocker, f/u 1 month as scheduled or sooner if no improvement/worse  Orders:  -     US Abdomen Limited; Future; Expected date: 06/23/2022  -     famotidine (PEPCID) 20 MG tablet; Take 1 tablet (20 mg total) by mouth 2 (two) times daily.  Dispense: 60 tablet; Refill: 0      Follow up if symptoms worsen or fail to improve, for as scheduled next month with Bonnie.        6/24/2022 Alena Ramsey, ANA

## 2022-06-23 NOTE — TELEPHONE ENCOUNTER
----- Message from Shawnee Munoz MA sent at 6/23/2022 12:34 PM CDT -----  Regarding: stomach issues  Patient complains of intermittent stomach pain.  Would like to be seen asap.  [phone]

## 2022-07-06 ENCOUNTER — HOSPITAL ENCOUNTER (OUTPATIENT)
Dept: RADIOLOGY | Facility: HOSPITAL | Age: 72
Discharge: HOME | End: 2022-07-06
Attending: NURSE PRACTITIONER
Payer: MEDICARE

## 2022-07-06 DIAGNOSIS — R10.13 EPIGASTRIC PAIN: ICD-10-CM

## 2022-07-06 PROCEDURE — 76705 ECHO EXAM OF ABDOMEN: CPT | Mod: TC,PO

## 2022-07-07 ENCOUNTER — TELEPHONE (OUTPATIENT)
Dept: FAMILY MEDICINE | Facility: CLINIC | Age: 72
End: 2022-07-07

## 2022-07-07 NOTE — TELEPHONE ENCOUNTER
Spoke with pt in regards to recent abdominal ultrasounds. Verbalized per Alena that her  abdominal ultrasound shows gallbladder is normal and the bile duct which drains the gallbladder, liver and pancreas is normal size.  There are some simple cysts in the liver which have been stable since 2017 and should not be causing her pain. Portions of the visualized pancreas are unremarkable.  If she continues with her abdominal pain would recommend scheduling appointment with Dr. Lewis. Pt acknowledge understanding.

## 2022-07-07 NOTE — TELEPHONE ENCOUNTER
----- Message from Alena Ramsey NP sent at 7/6/2022  5:43 PM CDT -----  Please call patient let her know abdominal ultrasound shows gallbladder is normal and the bile duct which drains the gallbladder, liver and pancreas is normal size.  There are some simple cysts in the liver which have been stable since 2017 and should not be causing her pain. Portions of the visualized pancreas are unremarkable.  If she continues with her abdominal pain would recommend scheduling appointment with Dr. Lewis

## 2022-07-25 ENCOUNTER — TELEPHONE (OUTPATIENT)
Dept: FAMILY MEDICINE | Facility: CLINIC | Age: 72
End: 2022-07-25

## 2022-07-25 ENCOUNTER — OFFICE VISIT (OUTPATIENT)
Dept: FAMILY MEDICINE | Facility: CLINIC | Age: 72
End: 2022-07-25
Payer: MEDICARE

## 2022-07-25 ENCOUNTER — HOSPITAL ENCOUNTER (OUTPATIENT)
Dept: RADIOLOGY | Facility: HOSPITAL | Age: 72
Discharge: HOME | End: 2022-07-25
Attending: NURSE PRACTITIONER
Payer: MEDICARE

## 2022-07-25 VITALS
HEIGHT: 63 IN | OXYGEN SATURATION: 96 % | SYSTOLIC BLOOD PRESSURE: 120 MMHG | DIASTOLIC BLOOD PRESSURE: 70 MMHG | BODY MASS INDEX: 19.84 KG/M2 | WEIGHT: 112 LBS | HEART RATE: 84 BPM

## 2022-07-25 DIAGNOSIS — M54.2 NECK PAIN: ICD-10-CM

## 2022-07-25 DIAGNOSIS — M50.30 DDD (DEGENERATIVE DISC DISEASE), CERVICAL: Primary | ICD-10-CM

## 2022-07-25 DIAGNOSIS — E78.5 DYSLIPIDEMIA: ICD-10-CM

## 2022-07-25 DIAGNOSIS — F32.5 MAJOR DEPRESSIVE DISORDER WITH SINGLE EPISODE, IN REMISSION: ICD-10-CM

## 2022-07-25 DIAGNOSIS — M25.539 PAIN IN WRIST, UNSPECIFIED LATERALITY: ICD-10-CM

## 2022-07-25 DIAGNOSIS — Q68.1 THUMB IN PALM DEFORMITY: ICD-10-CM

## 2022-07-25 DIAGNOSIS — M54.9 DORSALGIA, UNSPECIFIED: ICD-10-CM

## 2022-07-25 DIAGNOSIS — F41.9 ANXIETY: ICD-10-CM

## 2022-07-25 DIAGNOSIS — M54.30 SCIATICA, UNSPECIFIED LATERALITY: ICD-10-CM

## 2022-07-25 PROCEDURE — 3008F BODY MASS INDEX DOCD: CPT | Mod: CPTII,S$GLB,, | Performed by: NURSE PRACTITIONER

## 2022-07-25 PROCEDURE — 3008F PR BODY MASS INDEX (BMI) DOCUMENTED: ICD-10-PCS | Mod: CPTII,S$GLB,, | Performed by: NURSE PRACTITIONER

## 2022-07-25 PROCEDURE — 1101F PT FALLS ASSESS-DOCD LE1/YR: CPT | Mod: CPTII,S$GLB,, | Performed by: NURSE PRACTITIONER

## 2022-07-25 PROCEDURE — 1159F MED LIST DOCD IN RCRD: CPT | Mod: CPTII,S$GLB,, | Performed by: NURSE PRACTITIONER

## 2022-07-25 PROCEDURE — 3288F FALL RISK ASSESSMENT DOCD: CPT | Mod: CPTII,S$GLB,, | Performed by: NURSE PRACTITIONER

## 2022-07-25 PROCEDURE — 72050 X-RAY EXAM NECK SPINE 4/5VWS: CPT | Mod: TC,PO

## 2022-07-25 PROCEDURE — 3288F PR FALLS RISK ASSESSMENT DOCUMENTED: ICD-10-PCS | Mod: CPTII,S$GLB,, | Performed by: NURSE PRACTITIONER

## 2022-07-25 PROCEDURE — 73100 X-RAY EXAM OF WRIST: CPT | Mod: TC,PO,LT

## 2022-07-25 PROCEDURE — 73130 X-RAY EXAM OF HAND: CPT | Mod: TC,PO,LT

## 2022-07-25 PROCEDURE — 3074F PR MOST RECENT SYSTOLIC BLOOD PRESSURE < 130 MM HG: ICD-10-PCS | Mod: CPTII,S$GLB,, | Performed by: NURSE PRACTITIONER

## 2022-07-25 PROCEDURE — 3078F DIAST BP <80 MM HG: CPT | Mod: CPTII,S$GLB,, | Performed by: NURSE PRACTITIONER

## 2022-07-25 PROCEDURE — 1101F PR PT FALLS ASSESS DOC 0-1 FALLS W/OUT INJ PAST YR: ICD-10-PCS | Mod: CPTII,S$GLB,, | Performed by: NURSE PRACTITIONER

## 2022-07-25 PROCEDURE — 99214 PR OFFICE/OUTPT VISIT, EST, LEVL IV, 30-39 MIN: ICD-10-PCS | Mod: S$GLB,,, | Performed by: NURSE PRACTITIONER

## 2022-07-25 PROCEDURE — 3078F PR MOST RECENT DIASTOLIC BLOOD PRESSURE < 80 MM HG: ICD-10-PCS | Mod: CPTII,S$GLB,, | Performed by: NURSE PRACTITIONER

## 2022-07-25 PROCEDURE — 99214 OFFICE O/P EST MOD 30 MIN: CPT | Mod: S$GLB,,, | Performed by: NURSE PRACTITIONER

## 2022-07-25 PROCEDURE — 1159F PR MEDICATION LIST DOCUMENTED IN MEDICAL RECORD: ICD-10-PCS | Mod: CPTII,S$GLB,, | Performed by: NURSE PRACTITIONER

## 2022-07-25 PROCEDURE — 3074F SYST BP LT 130 MM HG: CPT | Mod: CPTII,S$GLB,, | Performed by: NURSE PRACTITIONER

## 2022-07-25 RX ORDER — SERTRALINE HYDROCHLORIDE 25 MG/1
25 TABLET, FILM COATED ORAL DAILY
Qty: 90 TABLET | Refills: 0 | Status: SHIPPED | OUTPATIENT
Start: 2022-07-25 | End: 2022-07-25 | Stop reason: SDUPTHER

## 2022-07-25 RX ORDER — SERTRALINE HYDROCHLORIDE 50 MG/1
50 TABLET, FILM COATED ORAL DAILY
Qty: 90 TABLET | Refills: 0 | Status: SHIPPED | OUTPATIENT
Start: 2022-07-25 | End: 2022-10-18 | Stop reason: SDUPTHER

## 2022-07-25 NOTE — TELEPHONE ENCOUNTER
----- Message from Janie Holguin sent at 7/25/2022  2:06 PM CDT -----  Please call and schedule 4 week follow up. Patient would like an afternoon appointment. Patient's callback number is [phone].

## 2022-07-25 NOTE — PROGRESS NOTES
SUBJECTIVE:    Patient ID: Sylvia Maxwell is a 71 y.o. female.    Chief Complaint: Medication Management (No bottles/ mammo order will be done by Dr. Blank//dp)    71 year old female presents for check up. Treated for ddd. Depression, hyperlipidemia, and gerd. Taking meds as prescribed.Pt admits has been under a lot of stress d/t 's death in Feb. Has daily back pain. Worse at the end of the day. Sees dr. Gallardo. Has pain to left wrist. Reports has been ongoing since carpal tunnel surgery. Reports that saw dr. Alarcon but he was unable to find anything abnormal. Sleeping ok. Appetite is stable.       Office Visit on 07/25/2022   Component Date Value Ref Range Status    ANSLEY 07/25/2022 NEGATIVE  NEGATIVE Final    Rheumatoid Factor 07/25/2022 <14  <14 IU/mL Final    Sed Rate 07/25/2022 2  < OR = 30 mm/h Final   Office Visit on 03/03/2022   Component Date Value Ref Range Status    POC Rapid COVID 03/03/2022 Negative  Negative Final     Acceptable 03/03/2022 Yes   Final       History reviewed. No pertinent past medical history.  Social History     Socioeconomic History    Marital status:    Tobacco Use    Smoking status: Former Smoker    Smokeless tobacco: Never Used   Substance and Sexual Activity    Alcohol use: Yes    Drug use: Never     Social Determinants of Health     Financial Resource Strain: Unknown    Difficulty of Paying Living Expenses: Patient refused   Food Insecurity: Unknown    Worried About Running Out of Food in the Last Year: Patient refused    Ran Out of Food in the Last Year: Patient refused   Transportation Needs: Unknown    Lack of Transportation (Medical): Patient refused    Lack of Transportation (Non-Medical): Patient refused   Physical Activity: Insufficiently Active    Days of Exercise per Week: 4 days    Minutes of Exercise per Session: 30 min   Stress: No Stress Concern Present    Feeling of Stress : Only a little   Social Connections:  Unknown    Frequency of Communication with Friends and Family: More than three times a week    Frequency of Social Gatherings with Friends and Family: Twice a week    Active Member of Clubs or Organizations: No    Attends Club or Organization Meetings: Never    Marital Status:    Housing Stability: Unknown    Unable to Pay for Housing in the Last Year: Patient refused    Number of Places Lived in the Last Year: 1    Unstable Housing in the Last Year: Patient refused     Past Surgical History:   Procedure Laterality Date    BREAST CYST ASPIRATION Left     left wrist      arthritis    TUBAL LIGATION       Family History   Problem Relation Age of Onset    Eczema Neg Hx     Lupus Neg Hx     Psoriasis Neg Hx     Melanoma Neg Hx        Review of patient's allergies indicates:  No Known Allergies    Current Outpatient Medications:     ascorbate calcium, vitamin C, 500 mg Tab, Vitamin C 500 mg tablet  Take 1 tablet every day by oral route., Disp: , Rfl:     calcium-vitamin D3 (OS-FERNANDA 500 + D3) 500 mg-5 mcg (200 unit) per tablet, Take 1 tablet by mouth 2 (two) times daily with meals., Disp: , Rfl:     famotidine (PEPCID) 20 MG tablet, Take 1 tablet (20 mg total) by mouth 2 (two) times daily., Disp: 60 tablet, Rfl: 0    magnesium 200 mg Tab, magnesium, Disp: , Rfl:     multivit-min-iron-FA-K.gin 18 mg iron-400 mcg-50 mg Tab, Take 1 tablet by mouth once daily. , Disp: , Rfl:     pantoprazole (PROTONIX) 40 MG tablet, Take 1 tablet (40 mg total) by mouth once daily., Disp: 90 tablet, Rfl: 1    rosuvastatin (CRESTOR) 5 MG tablet, Take 1 tablet (5 mg total) by mouth once daily., Disp: 90 tablet, Rfl: 1    diazePAM (VALIUM) 10 MG Tab, Take 1 tablet (10 mg total) by mouth once as needed (anxiety). Take Valium 45-60 minutes before procedure., Disp: 1 tablet, Rfl: 0    sertraline (ZOLOFT) 50 MG tablet, Take 1 tablet (50 mg total) by mouth once daily., Disp: 90 tablet, Rfl: 0    Review of Systems  "  Constitutional: Negative for chills, fever and unexpected weight change.   HENT: Negative for ear pain, rhinorrhea and sore throat.    Respiratory: Negative for cough and shortness of breath.    Cardiovascular: Negative for chest pain, palpitations and leg swelling.   Gastrointestinal: Negative for abdominal pain, diarrhea, nausea and vomiting.   Musculoskeletal: Positive for back pain and neck pain. Negative for arthralgias.        Wrist, back.    Neurological: Negative for dizziness, weakness and headaches.   Psychiatric/Behavioral: Negative for agitation and sleep disturbance. The patient is not nervous/anxious.           Objective:      Vitals:    07/25/22 1327   BP: 120/70   Pulse: 84   SpO2: 96%   Weight: 50.8 kg (112 lb)   Height: 5' 3" (1.6 m)     Physical Exam  Vitals and nursing note reviewed.   Constitutional:       Appearance: Normal appearance. She is well-developed and normal weight. She is not ill-appearing.      Comments: thin   HENT:      Right Ear: External ear normal.      Left Ear: External ear normal.   Neck:      Vascular: No JVD.   Cardiovascular:      Rate and Rhythm: Normal rate and regular rhythm.      Heart sounds: No murmur heard.  Pulmonary:      Effort: Pulmonary effort is normal.      Breath sounds: Normal breath sounds.   Abdominal:      General: Bowel sounds are normal.      Palpations: Abdomen is soft.   Musculoskeletal:      Left wrist: Deformity and tenderness present. Decreased range of motion.      Left hand: No bony tenderness. Normal strength.      Cervical back: Neck supple. Spasms and tenderness present. Decreased range of motion.      Lumbar back: Tenderness present. Decreased range of motion. Positive right straight leg raise test.   Lymphadenopathy:      Cervical: No cervical adenopathy.   Skin:     General: Skin is warm and dry.      Findings: No rash.   Neurological:      Mental Status: She is alert and oriented to person, place, and time.      Gait: Gait normal. "   Psychiatric:         Speech: Speech normal.         Behavior: Behavior normal.           Assessment:       1. DDD (degenerative disc disease), cervical    2. Major depressive disorder with single episode, in remission    3. Dyslipidemia    4. 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 Neck pain    7. Dorsalgia, unspecified    8. Sciatica, unspecified laterality    9. Anxiety         Plan:       DDD (degenerative disc disease), cervical    Major depressive disorder with single episode, in remission  -     Discontinue: sertraline (ZOLOFT) 25 MG tablet; Take 1 tablet (25 mg total) by mouth once daily.  Dispense: 90 tablet; Refill: 0  -     sertraline (ZOLOFT) 50 MG tablet; Take 1 tablet (50 mg total) by mouth once daily.  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  -     X-Ray Wrist 2 View Left; Future  -     X-Ray Hand Complete Left; Future; Expected date: 07/25/2022  -     ANSLEY Screen w/Reflex; Future; Expected date: 07/25/2022  -     Rheumatoid Factor; Future; Expected date: 07/25/2022  -     Sedimentation rate; Future; Expected date: 07/25/2022    Neck pain  -     X-Ray Cervical Spine Complete 5 view; Future; Expected date: 07/25/2022    Dorsalgia, unspecified  -     MRI Lumbar Spine Without Contrast; Future; Expected date: 07/25/2022  -     ANSLEY Screen w/Reflex; Future; Expected date: 07/25/2022  -     Rheumatoid Factor; Future; Expected date: 07/25/2022  -     Sedimentation rate; Future; Expected date: 07/25/2022    Sciatica, unspecified laterality  -     MRI Lumbar Spine Without Contrast; Future; Expected date: 07/25/2022  -     ANSLEY Screen w/Reflex; Future; Expected date: 07/25/2022  -     Rheumatoid Factor; Future; Expected date: 07/25/2022  -     Sedimentation rate; Future; Expected date:  07/25/2022    Anxiety    Other orders  -     Sedimentation Rate      Follow up in about 4 weeks (around 8/22/2022) for medication management.        8/1/2022 Bonnie Arita

## 2022-07-27 ENCOUNTER — TELEPHONE (OUTPATIENT)
Dept: FAMILY MEDICINE | Facility: CLINIC | Age: 72
End: 2022-07-27

## 2022-07-27 DIAGNOSIS — F41.9 ANXIETY: Primary | ICD-10-CM

## 2022-07-27 LAB
ANA SER QL IF: NEGATIVE
ERYTHROCYTE [SEDIMENTATION RATE] IN BLOOD BY WESTERGREN METHOD: 2 MM/H
RHEUMATOID FACT SERPL-ACNC: <14 IU/ML

## 2022-07-27 NOTE — TELEPHONE ENCOUNTER
----- Message from Jennifer Sawyer sent at 7/27/2022  2:45 PM CDT -----  Patient called and stated that she would like to know if her prescription was sent over the [facility] she stated that they did not have the valium please give her a call at [phone]

## 2022-07-27 NOTE — TELEPHONE ENCOUNTER
----- Message from Jennifer Sawyer sent at 7/27/2022  3:11 PM CDT -----  Patient called and stated that she missed a call from the nurse please give her a call at [phone]

## 2022-07-27 NOTE — TELEPHONE ENCOUNTER
Spoke with pt in regards to message sent. Pt state that a Valium prescription was to be sent over to her pharmacy for the MRI scan she is scheduled for 08/11/2022.    No valium prescription was sent over. Are you okay with sending a prescription for Valium?

## 2022-07-28 RX ORDER — DIAZEPAM 10 MG/1
10 TABLET ORAL ONCE AS NEEDED
Qty: 1 TABLET | Refills: 0 | Status: SHIPPED | OUTPATIENT
Start: 2022-07-28 | End: 2022-10-18

## 2022-08-04 ENCOUNTER — PATIENT MESSAGE (OUTPATIENT)
Dept: FAMILY MEDICINE | Facility: CLINIC | Age: 72
End: 2022-08-04

## 2022-08-09 ENCOUNTER — TELEPHONE (OUTPATIENT)
Dept: FAMILY MEDICINE | Facility: CLINIC | Age: 72
End: 2022-08-09

## 2022-08-09 NOTE — TELEPHONE ENCOUNTER
----- Message from Shawnee Munoz MA sent at 8/9/2022  2:10 PM CDT -----  Regarding: covid test  Patient tested neg covid on thurs then at urgent care sat tested positive. Whitley neg home test today. Urgent care gave her rx for antiviral and now not sure if she should continue medication with a possible false positive. Please call patient .  [phone]

## 2022-08-09 NOTE — TELEPHONE ENCOUNTER
Spoke with pt, states her symptoms started last Tuesday/Wednesday. Advised patient not to take the antiviral since it has been over 5 days.

## 2022-08-11 ENCOUNTER — OFFICE VISIT (OUTPATIENT)
Dept: FAMILY MEDICINE | Facility: CLINIC | Age: 72
End: 2022-08-11
Payer: MEDICARE

## 2022-08-11 ENCOUNTER — PATIENT MESSAGE (OUTPATIENT)
Dept: FAMILY MEDICINE | Facility: CLINIC | Age: 72
End: 2022-08-11

## 2022-08-11 VITALS
BODY MASS INDEX: 19.67 KG/M2 | HEIGHT: 63 IN | DIASTOLIC BLOOD PRESSURE: 78 MMHG | WEIGHT: 111 LBS | HEART RATE: 76 BPM | SYSTOLIC BLOOD PRESSURE: 118 MMHG

## 2022-08-11 DIAGNOSIS — F32.5 MAJOR DEPRESSIVE DISORDER WITH SINGLE EPISODE, IN REMISSION: ICD-10-CM

## 2022-08-11 DIAGNOSIS — K21.9 GASTROESOPHAGEAL REFLUX DISEASE, UNSPECIFIED WHETHER ESOPHAGITIS PRESENT: ICD-10-CM

## 2022-08-11 DIAGNOSIS — E78.5 HYPERLIPIDEMIA, UNSPECIFIED HYPERLIPIDEMIA TYPE: ICD-10-CM

## 2022-08-11 DIAGNOSIS — L50.9 URTICARIA: ICD-10-CM

## 2022-08-11 DIAGNOSIS — U09.9 MULTIPLE PERSISTENT SYMPTOMS AFTER COVID-19: Primary | ICD-10-CM

## 2022-08-11 LAB
CTP QC/QA: YES
SARS-COV-2 RDRP RESP QL NAA+PROBE: NEGATIVE

## 2022-08-11 PROCEDURE — 1101F PT FALLS ASSESS-DOCD LE1/YR: CPT | Mod: CPTII,S$GLB,, | Performed by: NURSE PRACTITIONER

## 2022-08-11 PROCEDURE — 96372 THER/PROPH/DIAG INJ SC/IM: CPT | Mod: S$GLB,,, | Performed by: NURSE PRACTITIONER

## 2022-08-11 PROCEDURE — 96372 PR INJECTION,THERAP/PROPH/DIAG2ST, IM OR SUBCUT: ICD-10-PCS | Mod: S$GLB,,, | Performed by: NURSE PRACTITIONER

## 2022-08-11 PROCEDURE — 1101F PR PT FALLS ASSESS DOC 0-1 FALLS W/OUT INJ PAST YR: ICD-10-PCS | Mod: CPTII,S$GLB,, | Performed by: NURSE PRACTITIONER

## 2022-08-11 PROCEDURE — 3288F FALL RISK ASSESSMENT DOCD: CPT | Mod: CPTII,S$GLB,, | Performed by: NURSE PRACTITIONER

## 2022-08-11 PROCEDURE — U0002 COVID-19 LAB TEST NON-CDC: HCPCS | Mod: QW,S$GLB,, | Performed by: NURSE PRACTITIONER

## 2022-08-11 PROCEDURE — 3008F BODY MASS INDEX DOCD: CPT | Mod: CPTII,S$GLB,, | Performed by: NURSE PRACTITIONER

## 2022-08-11 PROCEDURE — 1159F MED LIST DOCD IN RCRD: CPT | Mod: CPTII,S$GLB,, | Performed by: NURSE PRACTITIONER

## 2022-08-11 PROCEDURE — 1159F PR MEDICATION LIST DOCUMENTED IN MEDICAL RECORD: ICD-10-PCS | Mod: CPTII,S$GLB,, | Performed by: NURSE PRACTITIONER

## 2022-08-11 PROCEDURE — 1160F PR REVIEW ALL MEDS BY PRESCRIBER/CLIN PHARMACIST DOCUMENTED: ICD-10-PCS | Mod: CPTII,S$GLB,, | Performed by: NURSE PRACTITIONER

## 2022-08-11 PROCEDURE — 1160F RVW MEDS BY RX/DR IN RCRD: CPT | Mod: CPTII,S$GLB,, | Performed by: NURSE PRACTITIONER

## 2022-08-11 PROCEDURE — 99213 PR OFFICE/OUTPT VISIT, EST, LEVL III, 20-29 MIN: ICD-10-PCS | Mod: 25,S$GLB,, | Performed by: NURSE PRACTITIONER

## 2022-08-11 PROCEDURE — 3288F PR FALLS RISK ASSESSMENT DOCUMENTED: ICD-10-PCS | Mod: CPTII,S$GLB,, | Performed by: NURSE PRACTITIONER

## 2022-08-11 PROCEDURE — 3008F PR BODY MASS INDEX (BMI) DOCUMENTED: ICD-10-PCS | Mod: CPTII,S$GLB,, | Performed by: NURSE PRACTITIONER

## 2022-08-11 PROCEDURE — 99213 OFFICE O/P EST LOW 20 MIN: CPT | Mod: 25,S$GLB,, | Performed by: NURSE PRACTITIONER

## 2022-08-11 PROCEDURE — U0002: ICD-10-PCS | Mod: QW,S$GLB,, | Performed by: NURSE PRACTITIONER

## 2022-08-11 RX ORDER — DEXAMETHASONE SODIUM PHOSPHATE 4 MG/ML
8 INJECTION, SOLUTION INTRA-ARTICULAR; INTRALESIONAL; INTRAMUSCULAR; INTRAVENOUS; SOFT TISSUE ONCE
Status: COMPLETED | OUTPATIENT
Start: 2022-08-11 | End: 2022-08-11

## 2022-08-11 RX ORDER — METHYLPREDNISOLONE 4 MG/1
TABLET ORAL
Qty: 21 EACH | Refills: 0 | Status: SHIPPED | OUTPATIENT
Start: 2022-08-11 | End: 2022-09-01

## 2022-08-11 RX ADMIN — DEXAMETHASONE SODIUM PHOSPHATE 8 MG: 4 INJECTION, SOLUTION INTRA-ARTICULAR; INTRALESIONAL; INTRAMUSCULAR; INTRAVENOUS; SOFT TISSUE at 02:08

## 2022-08-15 ENCOUNTER — PATIENT MESSAGE (OUTPATIENT)
Dept: FAMILY MEDICINE | Facility: CLINIC | Age: 72
End: 2022-08-15

## 2022-08-15 NOTE — TELEPHONE ENCOUNTER
Call patient.  Have her finish out the Medrol Dosepak.  His sinuses should slowly clear over the next 2 weeks.  If not doing well after those 2 weeks she is welcome to see an ENT doctor, either Dr. Jr Anne or

## 2022-08-18 NOTE — PROGRESS NOTES
"  SUBJECTIVE:    Patient ID: Sylvia Maxwell is a 71 y.o. female.    Chief Complaint: Ear Fullness (Did not bring bottles//pt complains of bilateral ear fullness x 1 week//will do DEXA and Mammo with Dr. Blank//bs) and Rash (Patient also complains of a rash on different spots of her body )    71 y.o. female who presents today for urgent visit. She is treated regularly for depression, dyslipidemia and gerd.  She has been congested lately. Has had several covid exposures.   She stated she has a "dry, scratchy throat" and frontal sinus pressure that is worsen when she bending down .She reports occasional rhinorrhea, but denies any fevers, chills, or muscle aches.   She did a home covid test which was negative . 2 days later went to urgent care and was positive. Started on paxlovid. Has taken 2 doses. Noticed rash to body. Not sure what it is from.       Office Visit on 08/11/2022   Component Date Value Ref Range Status    POC Rapid COVID 08/11/2022 Negative  Negative Final-Edited     Acceptable 08/11/2022 Yes   Final-Edited   Office Visit on 07/25/2022   Component Date Value Ref Range Status    ANSLEY 07/25/2022 NEGATIVE  NEGATIVE Final    Rheumatoid Factor 07/25/2022 <14  <14 IU/mL Final    Sed Rate 07/25/2022 2  < OR = 30 mm/h Final   Office Visit on 03/03/2022   Component Date Value Ref Range Status    POC Rapid COVID 03/03/2022 Negative  Negative Final     Acceptable 03/03/2022 Yes   Final       History reviewed. No pertinent past medical history.  Social History     Socioeconomic History    Marital status:    Tobacco Use    Smoking status: Former Smoker    Smokeless tobacco: Never Used   Substance and Sexual Activity    Alcohol use: Yes    Drug use: Never     Social Determinants of Health     Financial Resource Strain: Low Risk     Difficulty of Paying Living Expenses: Not hard at all   Food Insecurity: No Food Insecurity    Worried About Running Out of Food in " the Last Year: Never true    Ran Out of Food in the Last Year: Never true   Transportation Needs: No Transportation Needs    Lack of Transportation (Medical): No    Lack of Transportation (Non-Medical): No   Physical Activity: Insufficiently Active    Days of Exercise per Week: 4 days    Minutes of Exercise per Session: 20 min   Stress: No Stress Concern Present    Feeling of Stress : Only a little   Social Connections: Unknown    Frequency of Communication with Friends and Family: More than three times a week    Frequency of Social Gatherings with Friends and Family: Twice a week    Active Member of Clubs or Organizations: No    Attends Club or Organization Meetings: Never    Marital Status:    Housing Stability: Low Risk     Unable to Pay for Housing in the Last Year: No    Number of Places Lived in the Last Year: 1    Unstable Housing in the Last Year: No     Past Surgical History:   Procedure Laterality Date    BREAST CYST ASPIRATION Left     left wrist      arthritis    TUBAL LIGATION       Family History   Problem Relation Age of Onset    Eczema Neg Hx     Lupus Neg Hx     Psoriasis Neg Hx     Melanoma Neg Hx        Review of patient's allergies indicates:  No Known Allergies    Current Outpatient Medications:     ascorbate calcium, vitamin C, 500 mg Tab, Vitamin C 500 mg tablet  Take 1 tablet every day by oral route., Disp: , Rfl:     calcium-vitamin D3 (OS-FERNANDA 500 + D3) 500 mg-5 mcg (200 unit) per tablet, Take 1 tablet by mouth 2 (two) times daily with meals., Disp: , Rfl:     diazePAM (VALIUM) 10 MG Tab, Take 1 tablet (10 mg total) by mouth once as needed (anxiety). Take Valium 45-60 minutes before procedure., Disp: 1 tablet, Rfl: 0    famotidine (PEPCID) 20 MG tablet, Take 1 tablet (20 mg total) by mouth 2 (two) times daily., Disp: 60 tablet, Rfl: 0    magnesium 200 mg Tab, magnesium, Disp: , Rfl:     methylPREDNISolone (MEDROL DOSEPACK) 4 mg tablet, use as directed, Disp:  "21 each, Rfl: 0    multivit-min-iron-FA-K.gin 18 mg iron-400 mcg-50 mg Tab, Take 1 tablet by mouth once daily. , Disp: , Rfl:     pantoprazole (PROTONIX) 40 MG tablet, Take 1 tablet (40 mg total) by mouth once daily., Disp: 90 tablet, Rfl: 1    rosuvastatin (CRESTOR) 5 MG tablet, Take 1 tablet (5 mg total) by mouth once daily., Disp: 90 tablet, Rfl: 1    sertraline (ZOLOFT) 50 MG tablet, Take 1 tablet (50 mg total) by mouth once daily., Disp: 90 tablet, Rfl: 0    Review of Systems   Constitutional: Negative for chills, fever and unexpected weight change.   HENT: Positive for sore throat. Negative for ear pain and rhinorrhea.    Eyes: Negative for pain and visual disturbance.   Respiratory: Negative for cough and shortness of breath.    Cardiovascular: Negative for chest pain, palpitations and leg swelling.   Gastrointestinal: Negative for abdominal pain, diarrhea, nausea and vomiting.   Genitourinary: Negative for difficulty urinating, hematuria and vaginal bleeding.   Musculoskeletal: Negative for arthralgias.   Skin: Positive for rash.   Neurological: Negative for dizziness, weakness and headaches.   Psychiatric/Behavioral: Negative for agitation and sleep disturbance. The patient is not nervous/anxious.           Objective:      Vitals:    08/11/22 1348   BP: 118/78   Pulse: 76   Weight: 50.3 kg (111 lb)   Height: 5' 3" (1.6 m)     Physical Exam  Vitals and nursing note reviewed.   Constitutional:       General: She is not in acute distress.     Appearance: Normal appearance. She is well-developed. She is not ill-appearing.   HENT:      Right Ear: External ear normal.      Left Ear: External ear normal.      Mouth/Throat:      Tonsils: No tonsillar exudate.   Cardiovascular:      Rate and Rhythm: Normal rate and regular rhythm.      Heart sounds: Normal heart sounds.   Pulmonary:      Breath sounds: Normal breath sounds.   Lymphadenopathy:      Cervical: No cervical adenopathy.   Skin:     Findings: Rash " present. Rash is urticarial.   Neurological:      Mental Status: She is alert.           Assessment:       1. Multiple persistent symptoms after COVID-19    2. Hyperlipidemia, unspecified hyperlipidemia type    3. Gastroesophageal reflux disease, unspecified whether esophagitis present    4. Major depressive disorder with single episode, in remission    5. Urticaria         Plan:       Multiple persistent symptoms after COVID-19  -     POCT COVID-19 Rapid Screening    Hyperlipidemia, unspecified hyperlipidemia type    Gastroesophageal reflux disease, unspecified whether esophagitis present    Major depressive disorder with single episode, in remission    Urticaria  Comments:  stop paxlovid. call if symptoms perist    Other orders  -     dexamethasone injection 8 mg  -     methylPREDNISolone (MEDROL DOSEPACK) 4 mg tablet; use as directed  Dispense: 21 each; Refill: 0      Follow up if symptoms worsen or fail to improve, for medication management.        8/18/2022 Bonnie Arita

## 2022-08-23 ENCOUNTER — HOSPITAL ENCOUNTER (OUTPATIENT)
Dept: RADIOLOGY | Facility: HOSPITAL | Age: 72
Discharge: HOME | End: 2022-08-23
Attending: NURSE PRACTITIONER
Payer: MEDICARE

## 2022-08-23 DIAGNOSIS — M54.30 SCIATICA, UNSPECIFIED LATERALITY: ICD-10-CM

## 2022-08-23 DIAGNOSIS — M54.9 DORSALGIA, UNSPECIFIED: ICD-10-CM

## 2022-08-23 PROCEDURE — 72148 MRI LUMBAR SPINE W/O DYE: CPT | Mod: TC,PO

## 2022-09-23 DIAGNOSIS — Z13.820 ENCOUNTER FOR OSTEOPOROSIS SCREENING IN ASYMPTOMATIC POSTMENOPAUSAL PATIENT: Primary | ICD-10-CM

## 2022-09-23 DIAGNOSIS — Z78.0 ENCOUNTER FOR OSTEOPOROSIS SCREENING IN ASYMPTOMATIC POSTMENOPAUSAL PATIENT: Primary | ICD-10-CM

## 2022-09-26 DIAGNOSIS — Z12.31 ENCOUNTER FOR SCREENING MAMMOGRAM FOR MALIGNANT NEOPLASM OF BREAST: Primary | ICD-10-CM

## 2022-10-04 ENCOUNTER — CLINICAL SUPPORT (OUTPATIENT)
Dept: FAMILY MEDICINE | Facility: CLINIC | Age: 72
End: 2022-10-04
Payer: MEDICARE

## 2022-10-04 DIAGNOSIS — Z23 NEED FOR INFLUENZA VACCINATION: Primary | ICD-10-CM

## 2022-10-04 PROCEDURE — 90662 IIV NO PRSV INCREASED AG IM: CPT | Mod: S$GLB,,, | Performed by: NURSE PRACTITIONER

## 2022-10-04 PROCEDURE — G0008 FLU VACCINE - QUADRIVALENT - HIGH DOSE (65+) PRESERVATIVE FREE IM: ICD-10-PCS | Mod: S$GLB,,, | Performed by: NURSE PRACTITIONER

## 2022-10-04 PROCEDURE — 90662 FLU VACCINE - QUADRIVALENT - HIGH DOSE (65+) PRESERVATIVE FREE IM: ICD-10-PCS | Mod: S$GLB,,, | Performed by: NURSE PRACTITIONER

## 2022-10-04 PROCEDURE — G0008 ADMIN INFLUENZA VIRUS VAC: HCPCS | Mod: S$GLB,,, | Performed by: NURSE PRACTITIONER

## 2022-10-11 ENCOUNTER — HOSPITAL ENCOUNTER (OUTPATIENT)
Dept: RADIOLOGY | Facility: HOSPITAL | Age: 72
Discharge: HOME | End: 2022-10-11
Attending: SPECIALIST
Payer: MEDICARE

## 2022-10-11 VITALS — WEIGHT: 110.88 LBS | HEIGHT: 63 IN | BODY MASS INDEX: 19.64 KG/M2

## 2022-10-11 DIAGNOSIS — Z78.0 ENCOUNTER FOR OSTEOPOROSIS SCREENING IN ASYMPTOMATIC POSTMENOPAUSAL PATIENT: ICD-10-CM

## 2022-10-11 DIAGNOSIS — Z13.820 ENCOUNTER FOR OSTEOPOROSIS SCREENING IN ASYMPTOMATIC POSTMENOPAUSAL PATIENT: ICD-10-CM

## 2022-10-11 DIAGNOSIS — Z12.31 ENCOUNTER FOR SCREENING MAMMOGRAM FOR MALIGNANT NEOPLASM OF BREAST: ICD-10-CM

## 2022-10-11 PROCEDURE — 77063 BREAST TOMOSYNTHESIS BI: CPT | Mod: TC,PO

## 2022-10-11 PROCEDURE — 77080 DXA BONE DENSITY AXIAL: CPT | Mod: TC,PO

## 2022-10-14 ENCOUNTER — TELEPHONE (OUTPATIENT)
Dept: FAMILY MEDICINE | Facility: CLINIC | Age: 72
End: 2022-10-14

## 2022-10-14 NOTE — TELEPHONE ENCOUNTER
----- Message from Matthew Roque MA sent at 10/14/2022 10:49 AM CDT -----  Pt is calling for a na ppt with juan antonio Tuesday or Wednesday afternoon for the results of her mri and she is having issues with her knee. [phone]

## 2022-10-18 ENCOUNTER — OFFICE VISIT (OUTPATIENT)
Dept: FAMILY MEDICINE | Facility: CLINIC | Age: 72
End: 2022-10-18
Payer: MEDICARE

## 2022-10-18 VITALS
WEIGHT: 113 LBS | DIASTOLIC BLOOD PRESSURE: 68 MMHG | HEIGHT: 63 IN | HEART RATE: 68 BPM | BODY MASS INDEX: 20.02 KG/M2 | SYSTOLIC BLOOD PRESSURE: 114 MMHG

## 2022-10-18 DIAGNOSIS — M50.30 DDD (DEGENERATIVE DISC DISEASE), CERVICAL: Primary | ICD-10-CM

## 2022-10-18 DIAGNOSIS — F41.9 ANXIETY: ICD-10-CM

## 2022-10-18 DIAGNOSIS — F32.5 MAJOR DEPRESSIVE DISORDER WITH SINGLE EPISODE, IN REMISSION: ICD-10-CM

## 2022-10-18 DIAGNOSIS — M25.562 ACUTE PAIN OF LEFT KNEE: ICD-10-CM

## 2022-10-18 DIAGNOSIS — E78.5 DYSLIPIDEMIA: ICD-10-CM

## 2022-10-18 DIAGNOSIS — K21.9 GASTROESOPHAGEAL REFLUX DISEASE, UNSPECIFIED WHETHER ESOPHAGITIS PRESENT: ICD-10-CM

## 2022-10-18 DIAGNOSIS — E78.5 HYPERLIPIDEMIA, UNSPECIFIED HYPERLIPIDEMIA TYPE: ICD-10-CM

## 2022-10-18 PROCEDURE — 1126F PR PAIN SEVERITY QUANTIFIED, NO PAIN PRESENT: ICD-10-PCS | Mod: CPTII,S$GLB,, | Performed by: NURSE PRACTITIONER

## 2022-10-18 PROCEDURE — 3078F DIAST BP <80 MM HG: CPT | Mod: CPTII,S$GLB,, | Performed by: NURSE PRACTITIONER

## 2022-10-18 PROCEDURE — 3078F PR MOST RECENT DIASTOLIC BLOOD PRESSURE < 80 MM HG: ICD-10-PCS | Mod: CPTII,S$GLB,, | Performed by: NURSE PRACTITIONER

## 2022-10-18 PROCEDURE — 3074F PR MOST RECENT SYSTOLIC BLOOD PRESSURE < 130 MM HG: ICD-10-PCS | Mod: CPTII,S$GLB,, | Performed by: NURSE PRACTITIONER

## 2022-10-18 PROCEDURE — 1160F PR REVIEW ALL MEDS BY PRESCRIBER/CLIN PHARMACIST DOCUMENTED: ICD-10-PCS | Mod: CPTII,S$GLB,, | Performed by: NURSE PRACTITIONER

## 2022-10-18 PROCEDURE — 1126F AMNT PAIN NOTED NONE PRSNT: CPT | Mod: CPTII,S$GLB,, | Performed by: NURSE PRACTITIONER

## 2022-10-18 PROCEDURE — 1159F PR MEDICATION LIST DOCUMENTED IN MEDICAL RECORD: ICD-10-PCS | Mod: CPTII,S$GLB,, | Performed by: NURSE PRACTITIONER

## 2022-10-18 PROCEDURE — 99214 OFFICE O/P EST MOD 30 MIN: CPT | Mod: S$GLB,,, | Performed by: NURSE PRACTITIONER

## 2022-10-18 PROCEDURE — 3074F SYST BP LT 130 MM HG: CPT | Mod: CPTII,S$GLB,, | Performed by: NURSE PRACTITIONER

## 2022-10-18 PROCEDURE — 1159F MED LIST DOCD IN RCRD: CPT | Mod: CPTII,S$GLB,, | Performed by: NURSE PRACTITIONER

## 2022-10-18 PROCEDURE — 1160F RVW MEDS BY RX/DR IN RCRD: CPT | Mod: CPTII,S$GLB,, | Performed by: NURSE PRACTITIONER

## 2022-10-18 PROCEDURE — 99214 PR OFFICE/OUTPT VISIT, EST, LEVL IV, 30-39 MIN: ICD-10-PCS | Mod: S$GLB,,, | Performed by: NURSE PRACTITIONER

## 2022-10-18 RX ORDER — ROSUVASTATIN CALCIUM 5 MG/1
5 TABLET, COATED ORAL DAILY
Qty: 90 TABLET | Refills: 1 | Status: SHIPPED | OUTPATIENT
Start: 2022-10-18 | End: 2023-05-31 | Stop reason: SDUPTHER

## 2022-10-18 RX ORDER — PANTOPRAZOLE SODIUM 40 MG/1
40 TABLET, DELAYED RELEASE ORAL DAILY
Qty: 90 TABLET | Refills: 1 | Status: SHIPPED | OUTPATIENT
Start: 2022-10-18 | End: 2023-02-15 | Stop reason: SDUPTHER

## 2022-10-18 RX ORDER — SERTRALINE HYDROCHLORIDE 50 MG/1
50 TABLET, FILM COATED ORAL DAILY
Qty: 90 TABLET | Refills: 0 | Status: SHIPPED | OUTPATIENT
Start: 2022-10-18 | End: 2023-01-31 | Stop reason: SDUPTHER

## 2022-10-18 NOTE — PROGRESS NOTES
SUBJECTIVE:    Patient ID: Sylvia Maxwell is a 71 y.o. female.    Chief Complaint: Knee Pain (Left behind several weeks, follow up MRI, no bottles// SW)    71 y.o. female who presents today for urgent visit. She is treated regularly for depression, dyslipidemia and gerd.  She is complaining of left knee pain. Denies injury or trauma. Pain is worse with weight bearing. No previous trauma or surgery. Has not taken anything. Does have stairs to climb. Walks dog daily. Needs refills on meds. Back pain is improving      Office Visit on 08/11/2022   Component Date Value Ref Range Status    POC Rapid COVID 08/11/2022 Negative  Negative Final-Edited     Acceptable 08/11/2022 Yes   Final-Edited   Office Visit on 07/25/2022   Component Date Value Ref Range Status    ANSLEY 07/25/2022 NEGATIVE  NEGATIVE Final    Rheumatoid Factor 07/25/2022 <14  <14 IU/mL Final    Sed Rate 07/25/2022 2  < OR = 30 mm/h Final       History reviewed. No pertinent past medical history.  Social History     Socioeconomic History    Marital status:    Tobacco Use    Smoking status: Former    Smokeless tobacco: Never   Substance and Sexual Activity    Alcohol use: Yes    Drug use: Never     Social Determinants of Health     Financial Resource Strain: Low Risk     Difficulty of Paying Living Expenses: Not hard at all   Food Insecurity: No Food Insecurity    Worried About Running Out of Food in the Last Year: Never true    Ran Out of Food in the Last Year: Never true   Transportation Needs: No Transportation Needs    Lack of Transportation (Medical): No    Lack of Transportation (Non-Medical): No   Physical Activity: Insufficiently Active    Days of Exercise per Week: 4 days    Minutes of Exercise per Session: 20 min   Stress: No Stress Concern Present    Feeling of Stress : Only a little   Social Connections: Unknown    Frequency of Communication with Friends and Family: More than three times a week    Frequency of Social  Gatherings with Friends and Family: Twice a week    Active Member of Clubs or Organizations: No    Attends Club or Organization Meetings: Never    Marital Status:    Housing Stability: Low Risk     Unable to Pay for Housing in the Last Year: No    Number of Places Lived in the Last Year: 1    Unstable Housing in the Last Year: No     Past Surgical History:   Procedure Laterality Date    BREAST CYST ASPIRATION Left     left wrist      arthritis    TUBAL LIGATION       Family History   Problem Relation Age of Onset    Eczema Neg Hx     Lupus Neg Hx     Psoriasis Neg Hx     Melanoma Neg Hx        Review of patient's allergies indicates:  No Known Allergies    Current Outpatient Medications:     ascorbate calcium, vitamin C, 500 mg Tab, Vitamin C 500 mg tablet  Take 1 tablet every day by oral route., Disp: , Rfl:     calcium-vitamin D3 (OS-FERNANDA 500 + D3) 500 mg-5 mcg (200 unit) per tablet, Take 1 tablet by mouth 2 (two) times daily with meals., Disp: , Rfl:     magnesium 200 mg Tab, magnesium, Disp: , Rfl:     multivit-min-iron-FA-K.gin 18 mg iron-400 mcg-50 mg Tab, Take 1 tablet by mouth once daily. , Disp: , Rfl:     pantoprazole (PROTONIX) 40 MG tablet, Take 1 tablet (40 mg total) by mouth once daily., Disp: 90 tablet, Rfl: 1    rosuvastatin (CRESTOR) 5 MG tablet, Take 1 tablet (5 mg total) by mouth once daily., Disp: 90 tablet, Rfl: 1    sertraline (ZOLOFT) 50 MG tablet, Take 1 tablet (50 mg total) by mouth once daily., Disp: 90 tablet, Rfl: 0    Review of Systems   Constitutional:  Negative for chills, fever and unexpected weight change.   HENT:  Negative for ear pain, rhinorrhea and sore throat.    Eyes:  Negative for pain and visual disturbance.   Respiratory:  Negative for cough and shortness of breath.    Cardiovascular:  Negative for chest pain, palpitations and leg swelling.   Gastrointestinal:  Negative for abdominal pain, diarrhea, nausea and vomiting.   Genitourinary:  Negative for difficulty  "urinating, hematuria and vaginal bleeding.   Musculoskeletal:  Positive for arthralgias.        Knee pain   Skin:  Negative for rash.   Neurological:  Negative for dizziness, weakness and headaches.   Psychiatric/Behavioral:  Negative for agitation and sleep disturbance. The patient is not nervous/anxious.         Objective:      Vitals:    10/18/22 1303   BP: 114/68   Pulse: 68   Weight: 51.3 kg (113 lb)   Height: 5' 3" (1.6 m)     Physical Exam  Vitals and nursing note reviewed.   Constitutional:       General: She is not in acute distress.     Appearance: Normal appearance. She is well-developed.   Cardiovascular:      Rate and Rhythm: Normal rate and regular rhythm.      Heart sounds: No murmur heard.    No friction rub. No gallop.   Pulmonary:      Breath sounds: Normal breath sounds. No wheezing or rales.   Musculoskeletal:      Lumbar back: No spasms, tenderness or bony tenderness. Normal range of motion.      Left knee: Swelling and crepitus present. Decreased range of motion. Tenderness present.        Legs:       Comments: Palpation tenderness to posterior of left knee   Skin:     Findings: No rash.   Neurological:      Mental Status: She is alert and oriented to person, place, and time.      Sensory: No sensory deficit.      Gait: Gait normal.         Assessment:       1. DDD (degenerative disc disease), cervical    2. Hyperlipidemia, unspecified hyperlipidemia type    3. Major depressive disorder with single episode, in remission    4. Dyslipidemia    5. Gastroesophageal reflux disease, unspecified whether esophagitis present    6. Anxiety    7. Acute pain of left knee         Plan:       DDD (degenerative disc disease), cervical    Hyperlipidemia, unspecified hyperlipidemia type  -     rosuvastatin (CRESTOR) 5 MG tablet; Take 1 tablet (5 mg total) by mouth once daily.  Dispense: 90 tablet; Refill: 1    Major depressive disorder with single episode, in remission  -     sertraline (ZOLOFT) 50 MG tablet; " Take 1 tablet (50 mg total) by mouth once daily.  Dispense: 90 tablet; Refill: 0    Dyslipidemia    Gastroesophageal reflux disease, unspecified whether esophagitis present    Anxiety    Acute pain of left knee  Comments:  xray. will call with results    Other orders  -     pantoprazole (PROTONIX) 40 MG tablet; Take 1 tablet (40 mg total) by mouth once daily.  Dispense: 90 tablet; Refill: 1    Follow up in about 3 months (around 1/18/2023), or if symptoms worsen or fail to improve, for medication management.        10/31/2022 Bonnie Arita

## 2022-10-20 ENCOUNTER — HOSPITAL ENCOUNTER (OUTPATIENT)
Dept: RADIOLOGY | Facility: HOSPITAL | Age: 72
Discharge: HOME | End: 2022-10-20
Attending: SPECIALIST
Payer: MEDICARE

## 2022-10-20 DIAGNOSIS — R92.2 INCONCLUSIVE MAMMOGRAM: ICD-10-CM

## 2022-10-20 PROCEDURE — 77065 DX MAMMO INCL CAD UNI: CPT | Mod: TC,PO,RT

## 2022-11-14 ENCOUNTER — TELEPHONE (OUTPATIENT)
Dept: FAMILY MEDICINE | Facility: CLINIC | Age: 72
End: 2022-11-14

## 2022-11-14 DIAGNOSIS — M71.20 SYNOVIAL CYST OF POPLITEAL SPACE, UNSPECIFIED LATERALITY: Primary | ICD-10-CM

## 2022-11-14 NOTE — TELEPHONE ENCOUNTER
Spoke with pt who states she has been having knee pain since she saw Juan Antonio last - states it got better but is now worse. States she has a feeling she has a bakers cyst behind the knee. She also noticied a bump in her scalp that she feels like is getting worse. Wants to know if she should see ortho for the knee? Aware juan antonio is out of the office until tomorrow

## 2022-11-14 NOTE — TELEPHONE ENCOUNTER
----- Message from Matthew Roque MA sent at 11/14/2022  2:45 PM CST -----  Pt calling for an appt to be seen soon for ongoing left knee pain and a bump on her scalp that is tender and painful to touch. [phone]

## 2022-11-21 ENCOUNTER — OFFICE VISIT (OUTPATIENT)
Dept: ORTHOPEDICS | Facility: CLINIC | Age: 72
End: 2022-11-21
Payer: MEDICARE

## 2022-11-21 VITALS — WEIGHT: 113 LBS | BODY MASS INDEX: 20.02 KG/M2 | HEIGHT: 63 IN

## 2022-11-21 DIAGNOSIS — M17.12 PRIMARY OSTEOARTHRITIS OF LEFT KNEE: Primary | ICD-10-CM

## 2022-11-21 DIAGNOSIS — M71.22 SYNOVIAL CYST OF LEFT POPLITEAL SPACE: ICD-10-CM

## 2022-11-21 DIAGNOSIS — S83.242A TEAR OF MEDIAL MENISCUS OF LEFT KNEE, CURRENT, UNSPECIFIED TEAR TYPE, INITIAL ENCOUNTER: ICD-10-CM

## 2022-11-21 PROCEDURE — 3008F BODY MASS INDEX DOCD: CPT | Mod: CPTII,S$GLB,, | Performed by: ORTHOPAEDIC SURGERY

## 2022-11-21 PROCEDURE — 3288F PR FALLS RISK ASSESSMENT DOCUMENTED: ICD-10-PCS | Mod: CPTII,S$GLB,, | Performed by: ORTHOPAEDIC SURGERY

## 2022-11-21 PROCEDURE — 99213 OFFICE O/P EST LOW 20 MIN: CPT | Mod: S$GLB,,, | Performed by: ORTHOPAEDIC SURGERY

## 2022-11-21 PROCEDURE — 3008F PR BODY MASS INDEX (BMI) DOCUMENTED: ICD-10-PCS | Mod: CPTII,S$GLB,, | Performed by: ORTHOPAEDIC SURGERY

## 2022-11-21 PROCEDURE — 3288F FALL RISK ASSESSMENT DOCD: CPT | Mod: CPTII,S$GLB,, | Performed by: ORTHOPAEDIC SURGERY

## 2022-11-21 PROCEDURE — 1125F AMNT PAIN NOTED PAIN PRSNT: CPT | Mod: CPTII,S$GLB,, | Performed by: ORTHOPAEDIC SURGERY

## 2022-11-21 PROCEDURE — 1159F PR MEDICATION LIST DOCUMENTED IN MEDICAL RECORD: ICD-10-PCS | Mod: CPTII,S$GLB,, | Performed by: ORTHOPAEDIC SURGERY

## 2022-11-21 PROCEDURE — 1100F PR PT FALLS ASSESS DOC 2+ FALLS/FALL W/INJURY/YR: ICD-10-PCS | Mod: CPTII,S$GLB,, | Performed by: ORTHOPAEDIC SURGERY

## 2022-11-21 PROCEDURE — 1160F PR REVIEW ALL MEDS BY PRESCRIBER/CLIN PHARMACIST DOCUMENTED: ICD-10-PCS | Mod: CPTII,S$GLB,, | Performed by: ORTHOPAEDIC SURGERY

## 2022-11-21 PROCEDURE — 99213 PR OFFICE/OUTPT VISIT, EST, LEVL III, 20-29 MIN: ICD-10-PCS | Mod: S$GLB,,, | Performed by: ORTHOPAEDIC SURGERY

## 2022-11-21 PROCEDURE — 1159F MED LIST DOCD IN RCRD: CPT | Mod: CPTII,S$GLB,, | Performed by: ORTHOPAEDIC SURGERY

## 2022-11-21 PROCEDURE — 1160F RVW MEDS BY RX/DR IN RCRD: CPT | Mod: CPTII,S$GLB,, | Performed by: ORTHOPAEDIC SURGERY

## 2022-11-21 PROCEDURE — 1125F PR PAIN SEVERITY QUANTIFIED, PAIN PRESENT: ICD-10-PCS | Mod: CPTII,S$GLB,, | Performed by: ORTHOPAEDIC SURGERY

## 2022-11-21 PROCEDURE — 1100F PTFALLS ASSESS-DOCD GE2>/YR: CPT | Mod: CPTII,S$GLB,, | Performed by: ORTHOPAEDIC SURGERY

## 2022-11-21 NOTE — PROGRESS NOTES
Subjective:       Patient ID: Sylvia Maxwell is a 71 y.o. female.    Chief Complaint: Pain of the Left Knee (Left knee pain that she was seen for in March. States the pain has been getting worse. Ascending and descending stairs has become very painful.)      History of Present Illness    Prior to meeting with the patient I reviewed the medical chart in [facility]. This included reviewing the previous progress notes from our office, review of the patient's last appointment with their primary care provider, review of any visits to the emergency room, and review of any pain management appointments or procedures.   Patient is here today referred by primary care for left knee pain.  Recently seen by her primary care provider.  However today she states that she has very little pain except for some pain in the area of the popliteal fossa.  Not enough pain to do anything about.    Current Medications  Current Outpatient Medications   Medication Sig Dispense Refill    ascorbate calcium, vitamin C, 500 mg Tab Vitamin C 500 mg tablet   Take 1 tablet every day by oral route.      calcium-vitamin D3 (OS-FERNANDA 500 + D3) 500 mg-5 mcg (200 unit) per tablet Take 1 tablet by mouth 2 (two) times daily with meals.      magnesium 200 mg Tab magnesium      multivit-min-iron-FA-K.gin 18 mg iron-400 mcg-50 mg Tab Take 1 tablet by mouth once daily.       pantoprazole (PROTONIX) 40 MG tablet Take 1 tablet (40 mg total) by mouth once daily. 90 tablet 1    rosuvastatin (CRESTOR) 5 MG tablet Take 1 tablet (5 mg total) by mouth once daily. 90 tablet 1    sertraline (ZOLOFT) 50 MG tablet Take 1 tablet (50 mg total) by mouth once daily. 90 tablet 0     No current facility-administered medications for this visit.       Allergies  Review of patient's allergies indicates:  No Known Allergies    Past Medical History  History reviewed. No pertinent past medical history.    Surgical History  Past Surgical History:   Procedure Laterality Date    BREAST CYST  ASPIRATION Left     left wrist      arthritis    TUBAL LIGATION         Family History:   Family History   Problem Relation Age of Onset    Eczema Neg Hx     Lupus Neg Hx     Psoriasis Neg Hx     Melanoma Neg Hx        Social History:   Social History     Socioeconomic History    Marital status:    Tobacco Use    Smoking status: Former    Smokeless tobacco: Never   Substance and Sexual Activity    Alcohol use: Yes    Drug use: Never     Social Determinants of Health     Financial Resource Strain: Low Risk     Difficulty of Paying Living Expenses: Not hard at all   Food Insecurity: No Food Insecurity    Worried About Running Out of Food in the Last Year: Never true    Ran Out of Food in the Last Year: Never true   Transportation Needs: No Transportation Needs    Lack of Transportation (Medical): No    Lack of Transportation (Non-Medical): No   Physical Activity: Insufficiently Active    Days of Exercise per Week: 4 days    Minutes of Exercise per Session: 20 min   Stress: No Stress Concern Present    Feeling of Stress : Only a little   Social Connections: Unknown    Frequency of Communication with Friends and Family: More than three times a week    Frequency of Social Gatherings with Friends and Family: Twice a week    Active Member of Clubs or Organizations: No    Attends Club or Organization Meetings: Never    Marital Status:    Housing Stability: Low Risk     Unable to Pay for Housing in the Last Year: No    Number of Places Lived in the Last Year: 1    Unstable Housing in the Last Year: No       Hospitalization/Major Diagnostic Procedure:     Review of Systems     General/Constitutional:  Chills denies. Fatigue denies. Fever denies. Weight gain denies. Weight loss denies.    Respiratory:  Shortness of breath denies.    Cardiovascular:  Chest pain denies.    Gastrointestinal:  Constipation denies. Diarrhea denies. Nausea denies. Vomiting denies.     Hematology:  Easy bruising denies. Prolonged  bleeding denies.     Genitourinary:  Frequent urination denies. Pain in lower back denies. Painful urination denies.     Musculoskeletal:  See HPI for details    Skin:  Rash denies.    Neurologic:  Dizziness denies. Gait abnormalities denies. Seizures denies. Tingling/Numbess denies.    Psychiatric:  Anxiety denies. Depressed mood denies.     Objective:   Vital Signs: There were no vitals filed for this visit.     Physical Exam      General Examination:     Constitutional: The patient is alert and oriented to lace person and time. Mood is pleasant.     Head/Face: Normal facial features normal eyebrows    Eyes: Normal extraocular motion bilaterally    Lungs: Respirations are equal and unlabored    Gait is coordinated.    Cardiovascular: There are no swelling or varicosities present.    Lymphatic: Negative for adenopathy    Skin: Normal    Neurological: Level of consciousness normal. Oriented to place person and time and situation    Psychiatric: Oriented to time place person and situation    Nonantalgic gait.  Left knee exam demonstrates full active range of motion and normal strength with no ligamentous instability.  No significant effusion but I can palpate some mild popliteal fullness which would be indicative of a Baker cyst.  She does have a mildly positive Aleksandra's on exam.    XRAY Report/ Interpretation:  No new studies today.  We had ordered some updated x-rays of her left knee but by her own admission she has very little pain today.      Assessment:       1. Primary osteoarthritis of left knee    2. Synovial cyst of popliteal space, unspecified laterality          Plan:       Sylvia was seen today for pain.    Diagnoses and all orders for this visit:    Primary osteoarthritis of left knee  -     X-Ray Knee 1 or 2 View Left    Synovial cyst of popliteal space, unspecified laterality  -     Ambulatory referral/consult to Orthopedics         No follow-ups on file.  This is to attest that the physician's  assistant Damion Voss served in the capacity as a scribe for this patient's encounter.  This is also to verify that I have reviewed the patient's history and helped formulate the treatment plan and discussed it with the physician's assistant.  I have actively participated  in the evaluation and treatment plan for this patient visit.  The treatment plan and medical decision-making is as outlined below:  I offered her some different treatment options or diagnostics.  However at the end of the day, she decides that she does not want to do anything more aggressive because overall she has little to no symptomatology.  I did express to her that if she has some acute pain and swelling then she should call and make an appointment to come in so that we could possibly aspirate her knee and inject some corticosteroid.  I also recommend that if she has some prolonged chronic pain of the knee then she should call and I will order an MRI of the knee.  The intended the MRI would be to review and schedule her for arthroscopy to improve her symptoms.  Otherwise follow-up p.r.n..    Treatment options were discussed with regards to the nature of the medical condition. Conservative pain intervention and surgical options were discussed in detail. The probability of success of each separate treatment option was discussed. The patient expressed a clear understanding of the treatment options. With regards to surgery, the procedure risk, benefits, complications, and outcomes were discussed. No guarantees were given with regards to surgical outcome.   The risk of complications, morbidity, and mortality of patient management decisions have been made at the time of this visit. These are associated with the patient's problems, diagnostic procedures and treatment options. This includes the possible management options selected and those considered but not selected by the patient after shared medical decision making we discussed with the  patient.     This note was created using Dragon voice recognition software that occasionally misinterpreted phrases or words.

## 2022-11-23 RX ORDER — MUPIROCIN 20 MG/G
OINTMENT TOPICAL 3 TIMES DAILY
Qty: 30 G | Refills: 0 | Status: SHIPPED | OUTPATIENT
Start: 2022-11-23 | End: 2023-09-20

## 2022-11-23 NOTE — TELEPHONE ENCOUNTER
Spoke with pt she stated her nose bleeding started when she started using the heat for the winter. Now her nose is itchy. Pt stated this happened every year and she has used mupirocin in the past.

## 2022-11-23 NOTE — TELEPHONE ENCOUNTER
----- Message from Matthew Roque MA sent at 11/23/2022 12:07 PM CST -----  Pt calling because her nose is bleeding when she wipes it and it is itchy. Pt would like to have a script for mupiricon called in. Pt is asking for a call back when it is sent or if it won't be sent.

## 2022-12-05 ENCOUNTER — TELEPHONE (OUTPATIENT)
Dept: PODIATRY | Facility: CLINIC | Age: 72
End: 2022-12-05
Payer: MEDICARE

## 2022-12-05 NOTE — TELEPHONE ENCOUNTER
----- Message from Janie Shirley sent at 12/5/2022  4:34 PM CST -----  Contact: Patient  Type:  Sooner Appointment Request    Caller is requesting a sooner appointment.  Caller declined first available appointment listed below.  Caller will not accept being placed on the waitlist and is requesting a message be sent to doctor.    Name of Caller: Patient  When is the first available appointment? N/a  Symptoms: ankle and foot pain  Best Call Back Number: [phone]  Additional Information: Please contact Patient to schedule a sooner appointment.

## 2022-12-05 NOTE — TELEPHONE ENCOUNTER
----- Message from Janie Shirley sent at 12/5/2022  4:34 PM CST -----  Contact: Patient  Type:  Sooner Appointment Request    Caller is requesting a sooner appointment.  Caller declined first available appointment listed below.  Caller will not accept being placed on the waitlist and is requesting a message be sent to doctor.    Name of Caller: Patient  When is the first available appointment? N/a  Symptoms: ankle and foot pain  Best Call Back Number: [phone]  Additional Information: Please contact Patient to schedule a sooner appointment.

## 2022-12-07 ENCOUNTER — HOSPITAL ENCOUNTER (OUTPATIENT)
Dept: RADIOLOGY | Facility: CLINIC | Age: 72
Discharge: HOME | End: 2022-12-07
Attending: PODIATRIST
Payer: MEDICARE

## 2022-12-07 ENCOUNTER — OFFICE VISIT (OUTPATIENT)
Dept: PODIATRY | Facility: CLINIC | Age: 72
End: 2022-12-07
Payer: MEDICARE

## 2022-12-07 VITALS — RESPIRATION RATE: 16 BRPM | WEIGHT: 110 LBS | HEIGHT: 63 IN | BODY MASS INDEX: 19.49 KG/M2

## 2022-12-07 DIAGNOSIS — M25.571 SINUS TARSI SYNDROME OF RIGHT FOOT: Primary | ICD-10-CM

## 2022-12-07 DIAGNOSIS — M79.671 RIGHT FOOT PAIN: ICD-10-CM

## 2022-12-07 PROCEDURE — 3288F FALL RISK ASSESSMENT DOCD: CPT | Mod: CPTII,S$GLB,, | Performed by: PODIATRIST

## 2022-12-07 PROCEDURE — 73630 X-RAY EXAM OF FOOT: CPT | Mod: RT,S$GLB,, | Performed by: RADIOLOGY

## 2022-12-07 PROCEDURE — 73630 XR FOOT COMPLETE 3 VIEW RIGHT: ICD-10-PCS | Mod: RT,S$GLB,, | Performed by: RADIOLOGY

## 2022-12-07 PROCEDURE — 3288F PR FALLS RISK ASSESSMENT DOCUMENTED: ICD-10-PCS | Mod: CPTII,S$GLB,, | Performed by: PODIATRIST

## 2022-12-07 PROCEDURE — 3008F BODY MASS INDEX DOCD: CPT | Mod: CPTII,S$GLB,, | Performed by: PODIATRIST

## 2022-12-07 PROCEDURE — 1159F PR MEDICATION LIST DOCUMENTED IN MEDICAL RECORD: ICD-10-PCS | Mod: CPTII,S$GLB,, | Performed by: PODIATRIST

## 2022-12-07 PROCEDURE — 1159F MED LIST DOCD IN RCRD: CPT | Mod: CPTII,S$GLB,, | Performed by: PODIATRIST

## 2022-12-07 PROCEDURE — 1125F AMNT PAIN NOTED PAIN PRSNT: CPT | Mod: CPTII,S$GLB,, | Performed by: PODIATRIST

## 2022-12-07 PROCEDURE — 20600 PR DRAIN/INJECT SMALL JOINT/BURSA: ICD-10-PCS | Mod: RT,S$GLB,, | Performed by: PODIATRIST

## 2022-12-07 PROCEDURE — 1100F PR PT FALLS ASSESS DOC 2+ FALLS/FALL W/INJURY/YR: ICD-10-PCS | Mod: CPTII,S$GLB,, | Performed by: PODIATRIST

## 2022-12-07 PROCEDURE — 99203 OFFICE O/P NEW LOW 30 MIN: CPT | Mod: 25,S$GLB,, | Performed by: PODIATRIST

## 2022-12-07 PROCEDURE — 1100F PTFALLS ASSESS-DOCD GE2>/YR: CPT | Mod: CPTII,S$GLB,, | Performed by: PODIATRIST

## 2022-12-07 PROCEDURE — 1160F PR REVIEW ALL MEDS BY PRESCRIBER/CLIN PHARMACIST DOCUMENTED: ICD-10-PCS | Mod: CPTII,S$GLB,, | Performed by: PODIATRIST

## 2022-12-07 PROCEDURE — 1125F PR PAIN SEVERITY QUANTIFIED, PAIN PRESENT: ICD-10-PCS | Mod: CPTII,S$GLB,, | Performed by: PODIATRIST

## 2022-12-07 PROCEDURE — 99203 PR OFFICE/OUTPT VISIT, NEW, LEVL III, 30-44 MIN: ICD-10-PCS | Mod: 25,S$GLB,, | Performed by: PODIATRIST

## 2022-12-07 PROCEDURE — 20600 DRAIN/INJ JOINT/BURSA W/O US: CPT | Mod: RT,S$GLB,, | Performed by: PODIATRIST

## 2022-12-07 PROCEDURE — 1160F RVW MEDS BY RX/DR IN RCRD: CPT | Mod: CPTII,S$GLB,, | Performed by: PODIATRIST

## 2022-12-07 PROCEDURE — 3008F PR BODY MASS INDEX (BMI) DOCUMENTED: ICD-10-PCS | Mod: CPTII,S$GLB,, | Performed by: PODIATRIST

## 2022-12-07 RX ORDER — BUPIVACAINE HYDROCHLORIDE 5 MG/ML
1.5 INJECTION, SOLUTION PERINEURAL
Status: COMPLETED | OUTPATIENT
Start: 2022-12-07 | End: 2022-12-07

## 2022-12-07 RX ORDER — METHYLPREDNISOLONE ACETATE 40 MG/ML
20 INJECTION, SUSPENSION INTRA-ARTICULAR; INTRALESIONAL; INTRAMUSCULAR; SOFT TISSUE
Status: COMPLETED | OUTPATIENT
Start: 2022-12-07 | End: 2022-12-07

## 2022-12-07 RX ORDER — DEXAMETHASONE SODIUM PHOSPHATE 4 MG/ML
4 INJECTION, SOLUTION INTRA-ARTICULAR; INTRALESIONAL; INTRAMUSCULAR; INTRAVENOUS; SOFT TISSUE
Status: COMPLETED | OUTPATIENT
Start: 2022-12-07 | End: 2022-12-07

## 2022-12-07 RX ADMIN — BUPIVACAINE HYDROCHLORIDE 7.5 MG: 5 INJECTION, SOLUTION PERINEURAL at 09:12

## 2022-12-07 RX ADMIN — DEXAMETHASONE SODIUM PHOSPHATE 4 MG: 4 INJECTION, SOLUTION INTRA-ARTICULAR; INTRALESIONAL; INTRAMUSCULAR; INTRAVENOUS; SOFT TISSUE at 09:12

## 2022-12-07 RX ADMIN — METHYLPREDNISOLONE ACETATE 20 MG: 40 INJECTION, SUSPENSION INTRA-ARTICULAR; INTRALESIONAL; INTRAMUSCULAR; SOFT TISSUE at 09:12

## 2022-12-07 NOTE — PROGRESS NOTES
"  [address] [facility] [address]  [place] [address]  Phone: [phone]   [phone]    Patient's PCP:Damion Puckett MD  Referring Provider: Aaareferral Self    Subjective:      Chief Complaint:: Foot Pain (Right foot pain)    DESMOND Maxwell is a 72 y.o. female who presents today with a complaint of right foot pain and swelling lasting for eight days. Onset of symptoms sharp pain lateral right foot progressing across midfoot to medial aspect and reports no trauma.  Current symptoms include pain ranging from aching, to sharp and throbbing, swelling, and difficulty walking.  Aggravating factors are walking, weightbearing, range of motion. Symptoms have progressed. Treatment to date have included ice, stretching, and tylenol.    Vitals:    12/07/22 0917   Resp: 16   Weight: 49.9 kg (110 lb)   Height: 5' 3" (1.6 m)   PainSc:   8      Shoe Size: 7.5-8    Past Surgical History:   Procedure Laterality Date    BREAST CYST ASPIRATION Left     left wrist      arthritis    TUBAL LIGATION       History reviewed. No pertinent past medical history.  Family History   Problem Relation Age of Onset    Eczema Neg Hx     Lupus Neg Hx     Psoriasis Neg Hx     Melanoma Neg Hx         Social History:   Marital Status:   Alcohol History:  reports current alcohol use.  Tobacco History:  reports that she has quit smoking. She has never used smokeless tobacco.  Drug History:  reports no history of drug use.    Review of patient's allergies indicates:  No Known Allergies    Current Outpatient Medications   Medication Sig Dispense Refill    ascorbate calcium, vitamin C, 500 mg Tab Vitamin C 500 mg tablet   Take 1 tablet every day by oral route.      calcium-vitamin D3 (OS-FERNANDA 500 + D3) 500 mg-5 mcg (200 unit) per tablet Take 1 tablet by mouth 2 (two) times daily with meals.      magnesium 200 mg Tab magnesium      multivit-min-iron-FA-K.gin 18 mg iron-400 mcg-50 mg Tab Take 1 tablet by mouth once daily.       mupirocin " (BACTROBAN) 2 % ointment Apply topically 3 (three) times daily. 30 g 0    pantoprazole (PROTONIX) 40 MG tablet Take 1 tablet (40 mg total) by mouth once daily. 90 tablet 1    rosuvastatin (CRESTOR) 5 MG tablet Take 1 tablet (5 mg total) by mouth once daily. 90 tablet 1    sertraline (ZOLOFT) 50 MG tablet Take 1 tablet (50 mg total) by mouth once daily. 90 tablet 0     No current facility-administered medications for this visit.       Review of Systems   Constitutional:  Negative for chills, fatigue, fever and unexpected weight change.   HENT:  Negative for hearing loss and trouble swallowing.    Eyes:  Negative for photophobia and visual disturbance.   Respiratory:  Negative for cough, shortness of breath and wheezing.    Cardiovascular:  Negative for chest pain, palpitations and leg swelling.   Gastrointestinal:  Negative for abdominal pain and nausea.   Genitourinary:  Negative for dysuria and frequency.   Musculoskeletal:  Positive for arthralgias, back pain and gait problem. Negative for joint swelling.   Skin:  Negative for rash and wound.   Neurological:  Negative for tremors, seizures, weakness, numbness and headaches.   Hematological:  Does not bruise/bleed easily.       Objective:        Physical Exam:   Foot Exam    General  General Appearance: appears stated age and healthy   Orientation: alert and oriented to person, place, and time   Affect: appropriate   Gait: unimpaired       Right Foot/Ankle     Inspection and Palpation  Ecchymosis: none  Tenderness: subtalar joint   Swelling: subtalar joint   Arch: normal  Skin Exam: skin intact; no drainage, no ulcer and no erythema   Neurovascular  Dorsalis pedis: 2+  Posterior tibial: 2+  Capillary Refill: 2+  Varicose veins: not present  Saphenous nerve sensation: normal  Tibial nerve sensation: normal  Superficial peroneal nerve sensation: normal  Deep peroneal nerve sensation: normal  Sural nerve sensation: normal    Edema  Type of edema: non-pitting    Muscle  Strength  Ankle dorsiflexion: 5  Ankle plantar flexion: 5  Ankle inversion: 5  Ankle eversion: 5  Great toe extension: 5  Great toe flexion: 5    Range of Motion    Normal right ankle ROM  Passive  Ankle eversion: pain  Ankle inversion: pain    Active  Ankle eversion: pain  Ankle inversion: pain    Tests  Anterior drawer: negative   Talar tilt: negative   PT Tinel's sign: negative    Paresthesia: negative      Physical Exam  Cardiovascular:      Pulses:           Dorsalis pedis pulses are 2+ on the right side.        Posterior tibial pulses are 2+ on the right side.   Feet:      Right foot:      Skin integrity: No ulcer or erythema.             Right Ankle/Foot Exam     Swelling   The patient is swollen on the subtalar joint.    Tenderness   The patient is tender to palpation of the subtalar joint.    Range of Motion   The patient has normal right ankle ROM.        Muscle Strength   Right Lower Extremity   Ankle Dorsiflexion:  5   Plantar flexion:  5/5    Vascular Exam     Right Pulses  Dorsalis Pedis:      2+  Posterior Tibial:      2+         Imaging: X-Ray Foot Complete Right  Narrative: EXAMINATION:  XR FOOT COMPLETE 3 VIEW RIGHT    CLINICAL HISTORY:  . Pain in right foot    TECHNIQUE:  AP, lateral, and oblique views of the right foot were performed.    COMPARISON:  None    FINDINGS:  There is mild hallux valgus with bunion formation.  No fracture or dislocation.  The soft tissues are unremarkable.  Impression: As above    Electronically signed by: Rogerio Lemos MD  Date:    12/07/2022  Time:    09:43             Assessment:       1. Sinus tarsi syndrome of right foot    2. Right foot pain      Plan:   Sinus tarsi syndrome of right foot  -     X-Ray Foot Complete Right  -     methylPREDNISolone acetate injection 20 mg  -     BUPivacaine injection 7.5 mg  -     dexAMETHasone injection 4 mg  -     IMMOBILIZER FOR HOME USE    Right foot pain  -     X-Ray Foot Complete Right  -     methylPREDNISolone acetate  injection 20 mg  -     BUPivacaine injection 7.5 mg  -     dexAMETHasone injection 4 mg  -     IMMOBILIZER FOR HOME USE    Follow up if symptoms worsen or fail to improve.    Procedures Informed consent was obtained.  Time-out was called.  The area was prepped with alcohol, and a steroid injection was performed at right subtalar joint using 1.5 cc of 0.5% Marcaine w/out epi, 1 cc Dexamethasone, 0.5 cc Methylprednisolone. Patient tolerated the procedure well.           We are going to do a steroid injection in the right subtalar joint today I am going to place patient in a supportive ankle brace.  Recommend weight-bearing with running-type shoe.  Ice as needed for pain and swelling.  I discussed her that if her symptoms are not improving then I would recommend further immobilization in Cam Walker boot.    Counseling:     I provided patient education verbally regarding:   Patient diagnosis, treatment options, as well as alternatives, risks, and benefits.     This note was created using Dragon voice recognition software that occasionally misinterpreted phrases or words.

## 2022-12-07 NOTE — PATIENT INSTRUCTIONS
What Is Sinus Tarsi Syndrome?    Sinus tarsi syndrome is painful swelling on the outside of the joint below the ankle known as the subtalar joint. This joint allows the foot to move from side to side.       Causes  A common cause of sinus tarsi is flatfoot deformity. With flatfoot deformity, the arch of the foot drops and the two bones on the outside portion of the subtalar joint pinch against each other. This can put increased pressure on the soft tissue in that area, leading to inflammation of the joint lining or the tissue outside the joint.   Sinus tarsi syndrome also can occur due to arthritis in the subtalar joint, scar tissue, joint instability, or as a result of injury.       Symptoms  Sinus tarsi syndrome commonly leads to pain over the outside of the back of the foot. Swelling over the hollow between the ankle bone and the heel bone can develop. The swelling can enlarge so that it can be mistaken for a cyst or tumor.        Diagnosis  This syndrome is usually diagnosed by an exam by a foot and ankle orthopaedic surgeon. Your surgeon will see swelling over the outside of the joint below the ankle and tenderness over a specific area of the foot. X-rays can be helpful in diagnosis. On X-rays, your doctor may see collapse of the arch or arthritis.        Treatments  There are non-surgical and surgical treatment options available. In most cases, your doctor will attempt non-surgical treatments first. Anti-inflammatory medications may decrease the swelling in the sinus tarsi. A steroid injection may be tried if other medicines do not relieve the pain. An arch support can be used to relieve the pinching of the subtalar joint. A brace can be applied to the ankle and back of the foot to support and rest the subtalar joint.  Surgical treatments vary depending on the cause of the sinus tarsi pain. Options include removal of inflammation and scarring of the sinus tarsi. This can be done in an open or arthroscopic  technique.    If a flatfoot is the cause of the sinus tarsi pain, your surgeon may recommend correction of the flatfoot. If the subtalar joint has advanced arthritis, your doctor may recommend a subtalar fusion (arthrodesis).      Recovery  If surgery is performed, the recovery involves limited weightbearing until the stitches are removed and a fracture boot is placed on the foot. Weightbearing may be allowed at that time depending on the surgery performed. Usually, physical therapy is ordered to help regain range of motion and strength. A boot may be used for several weeks to aid walking.      Risks and Complications  All surgeries come with possible complications, including the risks associated with anesthesia, infection, damage to nerves and blood vessels, and bleeding or blood clots.      FAQs  Are there things that make people more likely to develop sinus tarsi syndrome?  People with flat feet and those who participate in activities that require cutting maneuvers can be more prone to this syndrome. Also, repetitive activities on uneven surfaces can make someone more likely to develop symptoms.    How often is surgery necessary to treat sinus tarsi syndrome?  Surgery usually is not necessary in most patients who develop sinus tarsi syndrome. Non-surgical treatment can be very successful in relieving pain and swelling. Prior to considering surgery, it is important to see a foot and ankle orthopaedic surgeon to identify the cause of the sinus tarsi syndrome and the best treatment for that problem.

## 2022-12-29 ENCOUNTER — OFFICE VISIT (OUTPATIENT)
Dept: CARDIOLOGY | Facility: CLINIC | Age: 72
End: 2022-12-29
Payer: MEDICARE

## 2022-12-29 VITALS
DIASTOLIC BLOOD PRESSURE: 72 MMHG | HEIGHT: 63 IN | HEART RATE: 61 BPM | WEIGHT: 114 LBS | BODY MASS INDEX: 20.2 KG/M2 | OXYGEN SATURATION: 98 % | RESPIRATION RATE: 16 BRPM | SYSTOLIC BLOOD PRESSURE: 126 MMHG

## 2022-12-29 DIAGNOSIS — I34.1 MITRAL VALVE PROLAPSE: Primary | ICD-10-CM

## 2022-12-29 DIAGNOSIS — E78.5 DYSLIPIDEMIA: ICD-10-CM

## 2022-12-29 DIAGNOSIS — K21.9 GASTROESOPHAGEAL REFLUX DISEASE, UNSPECIFIED WHETHER ESOPHAGITIS PRESENT: ICD-10-CM

## 2022-12-29 DIAGNOSIS — E78.2 MIXED HYPERLIPIDEMIA: ICD-10-CM

## 2022-12-29 PROCEDURE — 1160F PR REVIEW ALL MEDS BY PRESCRIBER/CLIN PHARMACIST DOCUMENTED: ICD-10-PCS | Mod: CPTII,S$GLB,, | Performed by: INTERNAL MEDICINE

## 2022-12-29 PROCEDURE — 1126F PR PAIN SEVERITY QUANTIFIED, NO PAIN PRESENT: ICD-10-PCS | Mod: CPTII,S$GLB,, | Performed by: INTERNAL MEDICINE

## 2022-12-29 PROCEDURE — 99214 PR OFFICE/OUTPT VISIT, EST, LEVL IV, 30-39 MIN: ICD-10-PCS | Mod: S$GLB,,, | Performed by: INTERNAL MEDICINE

## 2022-12-29 PROCEDURE — 3074F PR MOST RECENT SYSTOLIC BLOOD PRESSURE < 130 MM HG: ICD-10-PCS | Mod: CPTII,S$GLB,, | Performed by: INTERNAL MEDICINE

## 2022-12-29 PROCEDURE — 1159F PR MEDICATION LIST DOCUMENTED IN MEDICAL RECORD: ICD-10-PCS | Mod: CPTII,S$GLB,, | Performed by: INTERNAL MEDICINE

## 2022-12-29 PROCEDURE — 99999 PR PBB SHADOW E&M-EST. PATIENT-LVL III: CPT | Mod: PBBFAC,,, | Performed by: INTERNAL MEDICINE

## 2022-12-29 PROCEDURE — 1126F AMNT PAIN NOTED NONE PRSNT: CPT | Mod: CPTII,S$GLB,, | Performed by: INTERNAL MEDICINE

## 2022-12-29 PROCEDURE — 1100F PTFALLS ASSESS-DOCD GE2>/YR: CPT | Mod: CPTII,S$GLB,, | Performed by: INTERNAL MEDICINE

## 2022-12-29 PROCEDURE — 3078F DIAST BP <80 MM HG: CPT | Mod: CPTII,S$GLB,, | Performed by: INTERNAL MEDICINE

## 2022-12-29 PROCEDURE — 3074F SYST BP LT 130 MM HG: CPT | Mod: CPTII,S$GLB,, | Performed by: INTERNAL MEDICINE

## 2022-12-29 PROCEDURE — 99999 PR PBB SHADOW E&M-EST. PATIENT-LVL III: ICD-10-PCS | Mod: PBBFAC,,, | Performed by: INTERNAL MEDICINE

## 2022-12-29 PROCEDURE — 3008F BODY MASS INDEX DOCD: CPT | Mod: CPTII,S$GLB,, | Performed by: INTERNAL MEDICINE

## 2022-12-29 PROCEDURE — 3078F PR MOST RECENT DIASTOLIC BLOOD PRESSURE < 80 MM HG: ICD-10-PCS | Mod: CPTII,S$GLB,, | Performed by: INTERNAL MEDICINE

## 2022-12-29 PROCEDURE — 93000 EKG 12-LEAD: ICD-10-PCS | Mod: S$GLB,,, | Performed by: INTERNAL MEDICINE

## 2022-12-29 PROCEDURE — 93000 ELECTROCARDIOGRAM COMPLETE: CPT | Mod: S$GLB,,, | Performed by: INTERNAL MEDICINE

## 2022-12-29 PROCEDURE — 99214 OFFICE O/P EST MOD 30 MIN: CPT | Mod: S$GLB,,, | Performed by: INTERNAL MEDICINE

## 2022-12-29 PROCEDURE — 3288F FALL RISK ASSESSMENT DOCD: CPT | Mod: CPTII,S$GLB,, | Performed by: INTERNAL MEDICINE

## 2022-12-29 PROCEDURE — 3008F PR BODY MASS INDEX (BMI) DOCUMENTED: ICD-10-PCS | Mod: CPTII,S$GLB,, | Performed by: INTERNAL MEDICINE

## 2022-12-29 PROCEDURE — 1160F RVW MEDS BY RX/DR IN RCRD: CPT | Mod: CPTII,S$GLB,, | Performed by: INTERNAL MEDICINE

## 2022-12-29 PROCEDURE — 3288F PR FALLS RISK ASSESSMENT DOCUMENTED: ICD-10-PCS | Mod: CPTII,S$GLB,, | Performed by: INTERNAL MEDICINE

## 2022-12-29 PROCEDURE — 1100F PR PT FALLS ASSESS DOC 2+ FALLS/FALL W/INJURY/YR: ICD-10-PCS | Mod: CPTII,S$GLB,, | Performed by: INTERNAL MEDICINE

## 2022-12-29 PROCEDURE — 1159F MED LIST DOCD IN RCRD: CPT | Mod: CPTII,S$GLB,, | Performed by: INTERNAL MEDICINE

## 2022-12-29 NOTE — PROGRESS NOTES
Subjective:    Patient ID:  Sylvia Maxwell is a 72 y.o. female     Chief Complaint   Patient presents with    Hypertension    Follow-up       HPI:  Ms Sylvia Maxwell is a 72 y.o. female is here for follow-up.    Her breathing has been good denies any shortness of breath or difficulty in breathing denies any chest pain or tightness heaviness denies any dizziness or lightheadedness or loss of consciousness falls or head injury.    She is been taking all her medications regularly.    Review of patient's allergies indicates:  No Known Allergies    History reviewed. No pertinent past medical history.  Past Surgical History:   Procedure Laterality Date    BREAST CYST ASPIRATION Left     left wrist      arthritis    TUBAL LIGATION       Social History     Tobacco Use    Smoking status: Former    Smokeless tobacco: Never   Substance Use Topics    Alcohol use: Yes    Drug use: Never     Family History   Problem Relation Age of Onset    Eczema Neg Hx     Lupus Neg Hx     Psoriasis Neg Hx     Melanoma Neg Hx         Review of Systems:   Constitution: Negative for diaphoresis and fever.   HEENT: Negative for nosebleeds.    Cardiovascular: Negative for chest pain       No dyspnea on exertion       No leg swelling        No palpitations  Respiratory: Negative for shortness of breath and wheezing.    Hematologic/Lymphatic: Negative for bleeding problem. Does not bruise/bleed easily.   Skin: Negative for color change and rash.   Musculoskeletal: Negative for falls and myalgias.   Gastrointestinal: Negative for hematemesis and hematochezia.   Genitourinary: Negative for hematuria.   Neurological: Negative for dizziness and light-headedness.   Psychiatric/Behavioral: Negative for altered mental status and memory loss.          Objective:        Vitals:    12/29/22 1323   BP: 126/72   Pulse: 61   Resp: 16       Lab Results   Component Value Date    WBC 4.6 02/10/2022    HGB 12.4 02/10/2022    HCT 36.1 02/10/2022     02/10/2022     CHOL 171 02/10/2022    TRIG 63 02/10/2022    HDL 75 02/10/2022    ALT 22 02/10/2022    AST 23 02/10/2022     02/10/2022    K 4.1 02/10/2022     02/10/2022    CREATININE 0.52 (L) 02/10/2022    BUN 25 02/10/2022    CO2 29 02/10/2022    INR 1.0 11/18/2019    MICROALBUR 1.9 02/10/2022        ECHOCARDIOGRAM RESULTS  No results found for this or any previous visit.        CURRENT/PREVIOUS VISIT EKG  Results for orders placed or performed in visit on 12/09/20   IN OFFICE EKG 12-LEAD (to [facility])    Collection Time: 12/09/20 10:46 AM    Narrative    Test Reason : I34.1,I34.1,    Vent. Rate : 066 BPM     Atrial Rate : 066 BPM     P-R Int : 150 ms          QRS Dur : 084 ms      QT Int : 400 ms       P-R-T Axes : 031 -04 008 degrees     QTc Int : 419 ms    Normal sinus rhythm  Normal ECG  When compared with ECG of 18-NOV-2019 15:24,  Questionable change in The axis  Nonspecific T wave abnormality now evident in Inferior leads  Confirmed by Esmer Aguillon MD ([id number]) on 12/14/2020 5:27:58 PM    Referred By:             Confirmed By:Esmer Aguillon MD     No valid procedures specified.   No results found for this or any previous visit.      Physical Exam:  CONSTITUTIONAL: No fever, no chills  HEENT: Normocephalic, atraumatic,pupils reactive to light                 NECK:  No JVD no carotid bruit  CVS: S1S2+, RRR, faint systolic murmurs,   LUNGS: Clear  ABDOMEN: Soft, NT, BS+  EXTREMITIES: No cyanosis, edema  : No fung catheter  NEURO: AAO X 3  PSY: Normal affect      Medication List with Changes/Refills   Current Medications    ASCORBATE CALCIUM, VITAMIN C, 500 MG TAB    Vitamin C 500 mg tablet   Take 1 tablet every day by oral route.    CALCIUM-VITAMIN D3 (OS-FERNANDA 500 + D3) 500 MG-5 MCG (200 UNIT) PER TABLET    Take 1 tablet by mouth 2 (two) times daily with meals.    MAGNESIUM 200 MG TAB    magnesium    MULTIVIT-MIN-IRON-FA-K.GIN 18 MG IRON-400 MCG-50 MG TAB    Take 1 tablet by mouth once daily.      MUPIROCIN (BACTROBAN) 2 % OINTMENT    Apply topically 3 (three) times daily.    PANTOPRAZOLE (PROTONIX) 40 MG TABLET    Take 1 tablet (40 mg total) by mouth once daily.    ROSUVASTATIN (CRESTOR) 5 MG TABLET    Take 1 tablet (5 mg total) by mouth once daily.    SERTRALINE (ZOLOFT) 50 MG TABLET    Take 1 tablet (50 mg total) by mouth once daily.             Assessment:       1. Mitral valve prolapse    2. Dyslipidemia    3. Mixed hyperlipidemia    4. Gastroesophageal reflux disease, unspecified whether esophagitis present         Plan:   1. Patient has history of mitral valve prolapse.    Will do a 2D echocardiogram for LV function ejection fraction wall motion abnormality.  And also for valvular function.    2. Mixed hyperlipidemia   She is currently on rosuvastatin 5 mg p.o. daily continue the same and she follows up with her primary physician in regards with the blood work.  On her last blood work her total cholesterol was 171 HDL was 75 LDL was 82 and triglycerides were 63.  3. Gastroesophageal reflux disease she is currently on Protonix 40 mg p.o. daily continue the same.  She follows up with her primary physician in regards with.  5. Patient is doing well reviewed EKG independently she has normal sinus rhythm with heart rate of 61 beats per minute normal intervals and normal EKG.    6. Patient to continue current management I will see her back in the office after the testing has been completed.          Problem List Items Addressed This Visit          Cardiac/Vascular    Dyslipidemia    Mitral valve prolapse - Primary     Other Visit Diagnoses       Mixed hyperlipidemia        Gastroesophageal reflux disease, unspecified whether esophagitis present                No follow-ups on file.

## 2023-01-04 ENCOUNTER — HOSPITAL ENCOUNTER (OUTPATIENT)
Dept: CARDIOLOGY | Facility: HOSPITAL | Age: 73
Discharge: HOME | End: 2023-01-04
Attending: INTERNAL MEDICINE
Payer: MEDICARE

## 2023-01-04 VITALS — WEIGHT: 114 LBS | BODY MASS INDEX: 20.2 KG/M2 | HEIGHT: 63 IN

## 2023-01-04 DIAGNOSIS — K21.9 GASTROESOPHAGEAL REFLUX DISEASE, UNSPECIFIED WHETHER ESOPHAGITIS PRESENT: ICD-10-CM

## 2023-01-04 DIAGNOSIS — I34.1 MITRAL VALVE PROLAPSE: ICD-10-CM

## 2023-01-04 DIAGNOSIS — E78.5 DYSLIPIDEMIA: ICD-10-CM

## 2023-01-04 DIAGNOSIS — E78.2 MIXED HYPERLIPIDEMIA: ICD-10-CM

## 2023-01-04 LAB
AORTIC ROOT ANNULUS: 2.6 CM
AORTIC VALVE CUSP SEPERATION: 1.8 CM
AV INDEX (PROSTH): 0.73
AV MEAN GRADIENT: 5 MMHG
AV PEAK GRADIENT: 8 MMHG
AV REGURGITATION PRESSURE HALF TIME: 496 MS
AV VALVE AREA: 2.31 CM2
AV VELOCITY RATIO: 0.65
BSA FOR ECHO PROCEDURE: 1.52 M2
CV ECHO LV RWT: 0.41 CM
DOP CALC AO PEAK VEL: 1.43 M/S
DOP CALC AO VTI: 30.5 CM
DOP CALC LVOT AREA: 3.1 CM2
DOP CALC LVOT DIAMETER: 2 CM
DOP CALC LVOT PEAK VEL: 0.93 M/S
DOP CALC LVOT STROKE VOLUME: 70.34 CM3
DOP CALCLVOT PEAK VEL VTI: 22.4 CM
E WAVE DECELERATION TIME: 218 MSEC
E/A RATIO: 1.26
E/E' RATIO: 11.6 M/S
ECHO LV POSTERIOR WALL: 0.82 CM (ref 0.6–1.1)
EJECTION FRACTION: 65 %
FRACTIONAL SHORTENING: 25 % (ref 28–44)
INTERVENTRICULAR SEPTUM: 0.71 CM (ref 0.6–1.1)
IVRT: 141 MSEC
LEFT ATRIUM SIZE: 3.2 CM
LEFT INTERNAL DIMENSION IN SYSTOLE: 3.01 CM (ref 2.1–4)
LEFT VENTRICLE DIASTOLIC VOLUME INDEX: 46.58 ML/M2
LEFT VENTRICLE DIASTOLIC VOLUME: 70.8 ML
LEFT VENTRICLE MASS INDEX: 58 G/M2
LEFT VENTRICLE SYSTOLIC VOLUME INDEX: 19.8 ML/M2
LEFT VENTRICLE SYSTOLIC VOLUME: 30.1 ML
LEFT VENTRICULAR INTERNAL DIMENSION IN DIASTOLE: 4.02 CM (ref 3.5–6)
LEFT VENTRICULAR MASS: 88.78 G
LV LATERAL E/E' RATIO: 9.67 M/S
LV SEPTAL E/E' RATIO: 14.5 M/S
LVOT MG: 2 MMHG
LVOT MV: 0.63 CM/S
MV PEAK A VEL: 0.69 M/S
MV PEAK E VEL: 0.87 M/S
MV STENOSIS PRESSURE HALF TIME: 70 MS
MV VALVE AREA P 1/2 METHOD: 3.14 CM2
PISA AR MAX VEL: 3.39 M/S
PISA TR MAX VEL: 2.55 M/S
RIGHT VENTRICULAR END-DIASTOLIC DIMENSION: 1.72 CM
TDI LATERAL: 0.09 M/S
TDI SEPTAL: 0.06 M/S
TDI: 0.08 M/S
TR MAX PG: 26 MMHG

## 2023-01-04 PROCEDURE — 93306 TTE W/DOPPLER COMPLETE: CPT | Mod: 26,,, | Performed by: INTERNAL MEDICINE

## 2023-01-04 PROCEDURE — 93306 TTE W/DOPPLER COMPLETE: CPT

## 2023-01-04 PROCEDURE — 93306 ECHO (CUPID ONLY): ICD-10-PCS | Mod: 26,,, | Performed by: INTERNAL MEDICINE

## 2023-01-26 DIAGNOSIS — J32.4 PANSINUSITIS, UNSPECIFIED CHRONICITY: ICD-10-CM

## 2023-01-26 DIAGNOSIS — J01.90 ACUTE SINUSITIS, UNSPECIFIED: Primary | ICD-10-CM

## 2023-01-30 ENCOUNTER — PATIENT MESSAGE (OUTPATIENT)
Dept: FAMILY MEDICINE | Facility: CLINIC | Age: 73
End: 2023-01-30

## 2023-01-31 ENCOUNTER — PATIENT MESSAGE (OUTPATIENT)
Dept: FAMILY MEDICINE | Facility: CLINIC | Age: 73
End: 2023-01-31

## 2023-01-31 ENCOUNTER — TELEPHONE (OUTPATIENT)
Dept: FAMILY MEDICINE | Facility: CLINIC | Age: 73
End: 2023-01-31

## 2023-01-31 ENCOUNTER — HOSPITAL ENCOUNTER (OUTPATIENT)
Dept: RADIOLOGY | Facility: HOSPITAL | Age: 73
Discharge: HOME | End: 2023-01-31
Attending: OTOLARYNGOLOGY
Payer: MEDICARE

## 2023-01-31 DIAGNOSIS — F32.5 MAJOR DEPRESSIVE DISORDER WITH SINGLE EPISODE, IN REMISSION: ICD-10-CM

## 2023-01-31 DIAGNOSIS — J32.4 PANSINUSITIS, UNSPECIFIED CHRONICITY: ICD-10-CM

## 2023-01-31 PROCEDURE — 70486 CT MAXILLOFACIAL W/O DYE: CPT | Mod: TC,PO

## 2023-01-31 RX ORDER — SERTRALINE HYDROCHLORIDE 50 MG/1
50 TABLET, FILM COATED ORAL DAILY
Qty: 30 TABLET | Refills: 0 | Status: SHIPPED | OUTPATIENT
Start: 2023-01-31 | End: 2023-02-15 | Stop reason: SDUPTHER

## 2023-01-31 NOTE — TELEPHONE ENCOUNTER
----- Message from Jennifer Sawyer sent at 1/31/2023 10:07 AM CST -----  Patient called and stated that she was told to call and make a follow up appointment because she need a refill please give her a call at [phone]

## 2023-02-07 ENCOUNTER — PATIENT MESSAGE (OUTPATIENT)
Dept: FAMILY MEDICINE | Facility: CLINIC | Age: 73
End: 2023-02-07

## 2023-02-09 ENCOUNTER — PATIENT MESSAGE (OUTPATIENT)
Dept: FAMILY MEDICINE | Facility: CLINIC | Age: 73
End: 2023-02-09

## 2023-02-10 ENCOUNTER — TELEPHONE (OUTPATIENT)
Dept: FAMILY MEDICINE | Facility: CLINIC | Age: 73
End: 2023-02-10

## 2023-02-10 ENCOUNTER — PATIENT MESSAGE (OUTPATIENT)
Dept: FAMILY MEDICINE | Facility: CLINIC | Age: 73
End: 2023-02-10

## 2023-02-10 DIAGNOSIS — E78.5 HYPERLIPIDEMIA, UNSPECIFIED HYPERLIPIDEMIA TYPE: ICD-10-CM

## 2023-02-10 DIAGNOSIS — Z00.00 PHYSICAL EXAM: Primary | ICD-10-CM

## 2023-02-10 DIAGNOSIS — Z79.899 HIGH RISK MEDICATION USE: ICD-10-CM

## 2023-02-15 ENCOUNTER — OFFICE VISIT (OUTPATIENT)
Dept: FAMILY MEDICINE | Facility: CLINIC | Age: 73
End: 2023-02-15
Payer: MEDICARE

## 2023-02-15 VITALS
BODY MASS INDEX: 19.81 KG/M2 | HEIGHT: 63 IN | HEART RATE: 76 BPM | WEIGHT: 111.81 LBS | SYSTOLIC BLOOD PRESSURE: 120 MMHG | OXYGEN SATURATION: 99 % | DIASTOLIC BLOOD PRESSURE: 70 MMHG

## 2023-02-15 DIAGNOSIS — F32.5 MAJOR DEPRESSIVE DISORDER WITH SINGLE EPISODE, IN REMISSION: ICD-10-CM

## 2023-02-15 DIAGNOSIS — K21.9 GASTROESOPHAGEAL REFLUX DISEASE, UNSPECIFIED WHETHER ESOPHAGITIS PRESENT: ICD-10-CM

## 2023-02-15 DIAGNOSIS — R51.9 NONINTRACTABLE HEADACHE, UNSPECIFIED CHRONICITY PATTERN, UNSPECIFIED HEADACHE TYPE: Primary | ICD-10-CM

## 2023-02-15 DIAGNOSIS — M54.30 SCIATICA, UNSPECIFIED LATERALITY: ICD-10-CM

## 2023-02-15 DIAGNOSIS — M54.12 CERVICAL RADICULOPATHY: ICD-10-CM

## 2023-02-15 DIAGNOSIS — M25.50 ARTHRALGIA, UNSPECIFIED JOINT: ICD-10-CM

## 2023-02-15 LAB
ALBUMIN SERPL-MCNC: 4.3 G/DL (ref 3.6–5.1)
ALBUMIN/GLOB SERPL: 1.8 (CALC) (ref 1–2.5)
ALP SERPL-CCNC: 80 U/L (ref 37–153)
ALT SERPL-CCNC: 18 U/L (ref 6–29)
APPEARANCE UR: CLEAR
AST SERPL-CCNC: 21 U/L (ref 10–35)
BACTERIA #/AREA URNS HPF: NORMAL /HPF
BACTERIA UR CULT: NORMAL
BASOPHILS # BLD AUTO: 80 CELLS/UL (ref 0–200)
BASOPHILS NFR BLD AUTO: 2 %
BILIRUB SERPL-MCNC: 0.9 MG/DL (ref 0.2–1.2)
BILIRUB UR QL STRIP: NEGATIVE
BUN SERPL-MCNC: 19 MG/DL (ref 7–25)
BUN/CREAT SERPL: 35 (CALC) (ref 6–22)
CALCIUM SERPL-MCNC: 9.3 MG/DL (ref 8.6–10.4)
CHLORIDE SERPL-SCNC: 98 MMOL/L (ref 98–110)
CHOLEST SERPL-MCNC: 184 MG/DL
CHOLEST/HDLC SERPL: 2.3 (CALC)
CO2 SERPL-SCNC: 29 MMOL/L (ref 20–32)
COLOR UR: YELLOW
CREAT SERPL-MCNC: 0.54 MG/DL (ref 0.6–1)
EGFR: 98 ML/MIN/1.73M2
EOSINOPHIL # BLD AUTO: 32 CELLS/UL (ref 15–500)
EOSINOPHIL NFR BLD AUTO: 0.8 %
ERYTHROCYTE [DISTWIDTH] IN BLOOD BY AUTOMATED COUNT: 11.9 % (ref 11–15)
GLOBULIN SER CALC-MCNC: 2.4 G/DL (CALC) (ref 1.9–3.7)
GLUCOSE SERPL-MCNC: 80 MG/DL (ref 65–99)
GLUCOSE UR QL STRIP: NEGATIVE
HCT VFR BLD AUTO: 37.7 % (ref 35–45)
HDLC SERPL-MCNC: 80 MG/DL
HGB BLD-MCNC: 12.4 G/DL (ref 11.7–15.5)
HGB UR QL STRIP: NEGATIVE
HYALINE CASTS #/AREA URNS LPF: NORMAL /LPF
KETONES UR QL STRIP: NEGATIVE
LDLC SERPL CALC-MCNC: 90 MG/DL (CALC)
LEUKOCYTE ESTERASE UR QL STRIP: NEGATIVE
LYMPHOCYTES # BLD AUTO: 1092 CELLS/UL (ref 850–3900)
LYMPHOCYTES NFR BLD AUTO: 27.3 %
MCH RBC QN AUTO: 31.2 PG (ref 27–33)
MCHC RBC AUTO-ENTMCNC: 32.9 G/DL (ref 32–36)
MCV RBC AUTO: 95 FL (ref 80–100)
MONOCYTES # BLD AUTO: 520 CELLS/UL (ref 200–950)
MONOCYTES NFR BLD AUTO: 13 %
NEUTROPHILS # BLD AUTO: 2276 CELLS/UL (ref 1500–7800)
NEUTROPHILS NFR BLD AUTO: 56.9 %
NITRITE UR QL STRIP: NEGATIVE
NONHDLC SERPL-MCNC: 104 MG/DL (CALC)
PH UR STRIP: 6.5 [PH] (ref 5–8)
PLATELET # BLD AUTO: 201 THOUSAND/UL (ref 140–400)
PMV BLD REES-ECKER: 11.6 FL (ref 7.5–12.5)
POTASSIUM SERPL-SCNC: 4 MMOL/L (ref 3.5–5.3)
PROT SERPL-MCNC: 6.7 G/DL (ref 6.1–8.1)
PROT UR QL STRIP: NEGATIVE
RBC # BLD AUTO: 3.97 MILLION/UL (ref 3.8–5.1)
RBC #/AREA URNS HPF: NORMAL /HPF
SERVICE CMNT-IMP: NORMAL
SODIUM SERPL-SCNC: 137 MMOL/L (ref 135–146)
SP GR UR STRIP: 1.02 (ref 1–1.03)
SQUAMOUS #/AREA URNS HPF: NORMAL /HPF
TRIGL SERPL-MCNC: 56 MG/DL
TSH SERPL-ACNC: 1.32 MIU/L (ref 0.4–4.5)
WBC # BLD AUTO: 4 THOUSAND/UL (ref 3.8–10.8)
WBC #/AREA URNS HPF: NORMAL /HPF

## 2023-02-15 PROCEDURE — 1159F PR MEDICATION LIST DOCUMENTED IN MEDICAL RECORD: ICD-10-PCS | Mod: CPTII,S$GLB,, | Performed by: NURSE PRACTITIONER

## 2023-02-15 PROCEDURE — 3288F FALL RISK ASSESSMENT DOCD: CPT | Mod: CPTII,S$GLB,, | Performed by: NURSE PRACTITIONER

## 2023-02-15 PROCEDURE — 3288F PR FALLS RISK ASSESSMENT DOCUMENTED: ICD-10-PCS | Mod: CPTII,S$GLB,, | Performed by: NURSE PRACTITIONER

## 2023-02-15 PROCEDURE — 3008F BODY MASS INDEX DOCD: CPT | Mod: CPTII,S$GLB,, | Performed by: NURSE PRACTITIONER

## 2023-02-15 PROCEDURE — 3078F PR MOST RECENT DIASTOLIC BLOOD PRESSURE < 80 MM HG: ICD-10-PCS | Mod: CPTII,S$GLB,, | Performed by: NURSE PRACTITIONER

## 2023-02-15 PROCEDURE — 3008F PR BODY MASS INDEX (BMI) DOCUMENTED: ICD-10-PCS | Mod: CPTII,S$GLB,, | Performed by: NURSE PRACTITIONER

## 2023-02-15 PROCEDURE — 99214 PR OFFICE/OUTPT VISIT, EST, LEVL IV, 30-39 MIN: ICD-10-PCS | Mod: S$GLB,,, | Performed by: NURSE PRACTITIONER

## 2023-02-15 PROCEDURE — 1159F MED LIST DOCD IN RCRD: CPT | Mod: CPTII,S$GLB,, | Performed by: NURSE PRACTITIONER

## 2023-02-15 PROCEDURE — 3074F PR MOST RECENT SYSTOLIC BLOOD PRESSURE < 130 MM HG: ICD-10-PCS | Mod: CPTII,S$GLB,, | Performed by: NURSE PRACTITIONER

## 2023-02-15 PROCEDURE — 99214 OFFICE O/P EST MOD 30 MIN: CPT | Mod: S$GLB,,, | Performed by: NURSE PRACTITIONER

## 2023-02-15 PROCEDURE — 1101F PR PT FALLS ASSESS DOC 0-1 FALLS W/OUT INJ PAST YR: ICD-10-PCS | Mod: CPTII,S$GLB,, | Performed by: NURSE PRACTITIONER

## 2023-02-15 PROCEDURE — 1160F RVW MEDS BY RX/DR IN RCRD: CPT | Mod: CPTII,S$GLB,, | Performed by: NURSE PRACTITIONER

## 2023-02-15 PROCEDURE — 1101F PT FALLS ASSESS-DOCD LE1/YR: CPT | Mod: CPTII,S$GLB,, | Performed by: NURSE PRACTITIONER

## 2023-02-15 PROCEDURE — 3078F DIAST BP <80 MM HG: CPT | Mod: CPTII,S$GLB,, | Performed by: NURSE PRACTITIONER

## 2023-02-15 PROCEDURE — 3074F SYST BP LT 130 MM HG: CPT | Mod: CPTII,S$GLB,, | Performed by: NURSE PRACTITIONER

## 2023-02-15 PROCEDURE — 1160F PR REVIEW ALL MEDS BY PRESCRIBER/CLIN PHARMACIST DOCUMENTED: ICD-10-PCS | Mod: CPTII,S$GLB,, | Performed by: NURSE PRACTITIONER

## 2023-02-15 RX ORDER — PANTOPRAZOLE SODIUM 40 MG/1
40 TABLET, DELAYED RELEASE ORAL DAILY
Qty: 90 TABLET | Refills: 1 | Status: SHIPPED | OUTPATIENT
Start: 2023-04-20 | End: 2023-10-18 | Stop reason: SDUPTHER

## 2023-02-15 RX ORDER — TIZANIDINE 4 MG/1
4 TABLET ORAL EVERY 6 HOURS PRN
Qty: 30 TABLET | Refills: 0 | Status: SHIPPED | OUTPATIENT
Start: 2023-02-15 | End: 2023-02-25

## 2023-02-15 RX ORDER — SERTRALINE HYDROCHLORIDE 50 MG/1
50 TABLET, FILM COATED ORAL DAILY
Qty: 90 TABLET | Refills: 0 | Status: SHIPPED | OUTPATIENT
Start: 2023-02-15 | End: 2023-05-31 | Stop reason: SDUPTHER

## 2023-02-15 RX ORDER — METHYLPREDNISOLONE 4 MG/1
TABLET ORAL
Qty: 21 EACH | Refills: 0 | Status: SHIPPED | OUTPATIENT
Start: 2023-02-15 | End: 2023-03-08

## 2023-02-16 ENCOUNTER — PATIENT MESSAGE (OUTPATIENT)
Dept: FAMILY MEDICINE | Facility: CLINIC | Age: 73
End: 2023-02-16

## 2023-02-18 ENCOUNTER — PATIENT MESSAGE (OUTPATIENT)
Dept: FAMILY MEDICINE | Facility: CLINIC | Age: 73
End: 2023-02-18

## 2023-02-25 NOTE — PROGRESS NOTES
SUBJECTIVE:    Patient ID: Sylvia Maxwell is a 72 y.o. female.    Chief Complaint: Headache (No bottles/ Med refills/BG)    72y.o. female who presents today for urgent visit. She is treated regularly for depression, dyslipidemia and gerd.  She is complaining of headaches. Seems like headaches are becoming more frequent. Saw dr. Monique in January. Did not think headaches were sinus related. No visual changes. Seems to occur in frontal area. Does radiate to back some. . Denies injury or trauma. No previous trauma or surgery. Has not taken anything. Under some stress. Lives alone. Sold house. Packing up house.       Telephone on 02/10/2023   Component Date Value Ref Range Status    Cholesterol 02/14/2023 184  <200 mg/dL Final    HDL 02/14/2023 80  > OR = 50 mg/dL Final    Triglycerides 02/14/2023 56  <150 mg/dL Final    LDL Cholesterol 02/14/2023 90  mg/dL (calc) Final    HDL/Cholesterol Ratio 02/14/2023 2.3  <5.0 (calc) Final    Non HDL Chol. (LDL+VLDL) 02/14/2023 104  <130 mg/dL (calc) Final    TSH w/reflex to FT4 02/14/2023 1.32  0.40 - 4.50 mIU/L Final    Color, UA 02/14/2023 YELLOW  YELLOW Final    Appearance, UA 02/14/2023 CLEAR  CLEAR Final    Specific [place], UA 02/14/2023 1.021  1.001 - 1.035 Final    pH, UA 02/14/2023 6.5  5.0 - 8.0 Final    Glucose, UA 02/14/2023 NEGATIVE  NEGATIVE Final    Bilirubin, UA 02/14/2023 NEGATIVE  NEGATIVE Final    Ketones, UA 02/14/2023 NEGATIVE  NEGATIVE Final    Occult Blood UA 02/14/2023 NEGATIVE  NEGATIVE Final    Protein, UA 02/14/2023 NEGATIVE  NEGATIVE Final    Nitrite, UA 02/14/2023 NEGATIVE  NEGATIVE Final    Leukocytes, UA 02/14/2023 NEGATIVE  NEGATIVE Final    WBC Casts, UA 02/14/2023 NONE SEEN  < OR = 5 /HPF Final    RBC Casts, UA 02/14/2023 NONE SEEN  < OR = 2 /HPF Final    Squam Epithel, UA 02/14/2023 NONE SEEN  < OR = 5 /HPF Final    Bacteria, UA 02/14/2023 NONE SEEN  NONE SEEN /HPF Final    Hyaline Casts, UA 02/14/2023 NONE SEEN  NONE SEEN /LPF Final    Service  Cmt: 02/14/2023    Final    Reflexive Urine Culture 02/14/2023    Final    WBC 02/14/2023 4.0  3.8 - 10.8 Thousand/uL Final    RBC 02/14/2023 3.97  3.80 - 5.10 Million/uL Final    Hemoglobin 02/14/2023 12.4  11.7 - 15.5 g/dL Final    Hematocrit 02/14/2023 37.7  35.0 - 45.0 % Final    MCV 02/14/2023 95.0  80.0 - 100.0 fL Final    MCH 02/14/2023 31.2  27.0 - 33.0 pg Final    MCHC 02/14/2023 32.9  32.0 - 36.0 g/dL Final    RDW 02/14/2023 11.9  11.0 - 15.0 % Final    Platelets 02/14/2023 201  140 - 400 Thousand/uL Final    MPV 02/14/2023 11.6  7.5 - 12.5 fL Final    Neutrophils, Abs 02/14/2023 2,276  1,500 - 7,800 cells/uL Final    Lymph # 02/14/2023 1,092  850 - 3,900 cells/uL Final    Mono # 02/14/2023 520  200 - 950 cells/uL Final    Eos # 02/14/2023 32  15 - 500 cells/uL Final    Baso # 02/14/2023 80  0 - 200 cells/uL Final    Neutrophils Relative 02/14/2023 56.9  % Final    Lymph % 02/14/2023 27.3  % Final    Mono % 02/14/2023 13.0  % Final    Eosinophil % 02/14/2023 0.8  % Final    Basophil % 02/14/2023 2.0  % Final    Glucose 02/14/2023 80  65 - 99 mg/dL Final    BUN 02/14/2023 19  7 - 25 mg/dL Final    Creatinine 02/14/2023 0.54 (L)  0.60 - 1.00 mg/dL Final    eGFR 02/14/2023 98  > OR = 60 mL/min/1.73m2 Final    BUN/Creatinine Ratio 02/14/2023 35 (H)  6 - 22 (calc) Final    Sodium 02/14/2023 137  135 - 146 mmol/L Final    Potassium 02/14/2023 4.0  3.5 - 5.3 mmol/L Final    Chloride 02/14/2023 98  98 - 110 mmol/L Final    CO2 02/14/2023 29  20 - 32 mmol/L Final    Calcium 02/14/2023 9.3  8.6 - 10.4 mg/dL Final    Total Protein 02/14/2023 6.7  6.1 - 8.1 g/dL Final    Albumin 02/14/2023 4.3  3.6 - 5.1 g/dL Final    Globulin, Total 02/14/2023 2.4  1.9 - 3.7 g/dL (calc) Final    Albumin/Globulin Ratio 02/14/2023 1.8  1.0 - 2.5 (calc) Final    Total Bilirubin 02/14/2023 0.9  0.2 - 1.2 mg/dL Final    Alkaline Phosphatase 02/14/2023 80  37 - 153 U/L Final    AST 02/14/2023 21  10 - 35 U/L Final    ALT 02/14/2023 18  6 -  29 U/L Final   Hospital Outpatient Visit on 01/04/2023   Component Date Value Ref Range Status    AV regurgitation pressure 1/2 time 01/04/2023 496  ms Final    Ao peak celestine 01/04/2023 1.43  m/s Final    AV mean gradient 01/04/2023 5  mmHg Final    AORTIC VALVE CUSP SEPERATION 01/04/2023 1.80  cm Final    Ao VTI 01/04/2023 30.5  cm Final    IVRT 01/04/2023 141.00  msec Final    IVS 01/04/2023 0.71  0.6 - 1.1 cm Final    LVIDd 01/04/2023 4.02  3.5 - 6.0 cm Final    LVIDs 01/04/2023 3.01  2.1 - 4.0 cm Final    LVOT diameter 01/04/2023 2.00  cm Final    LVOT peak VTI 01/04/2023 22.40  cm Final    Posterior Wall 01/04/2023 0.82  0.6 - 1.1 cm Final    LA size 01/04/2023 3.20  cm Final    E wave deceleration time 01/04/2023 218.00  msec Final    MV stenosis pressure 1/2 time 01/04/2023 70.00  ms Final    MV Peak A [place] 01/04/2023 0.69  m/s Final    LVOT peak celestine 01/04/2023 0.93  m/s Final    RVDD 01/04/2023 1.72  cm Final    TR Max [place] 01/04/2023 2.55  m/s Final    Triscuspid Valve Regurgitation Pea* 01/04/2023 26  mmHg Final    BSA 01/04/2023 1.52  m2 Final    TDI SEPTAL 01/04/2023 0.06  m/s Final    LV LATERAL E/E' RATIO 01/04/2023 9.67  m/s Final    LV SEPTAL E/E' RATIO 01/04/2023 14.50  m/s Final    Left Ventricular Outflow Tract Melissa* 01/04/2023 0.63  cm/s Final    Left Ventricular Outflow Tract Melissa* 01/04/2023 2.00  mmHg Final    TDI LATERAL 01/04/2023 0.09  m/s Final    Ao root annulus 01/04/2023 2.60  cm Final    FS 01/04/2023 25  28 - 44 % Final    LV mass 01/04/2023 88.78  g Final    Left Ventricle Relative Wall Thick* 01/04/2023 0.41  cm Final    AV valve area 01/04/2023 2.31  cm2 Final    AV Velocity Ratio 01/04/2023 0.65   Final    AV index (prosthetic) 01/04/2023 0.73   Final    MV valve area p 1/2 method 01/04/2023 3.14  cm2 Final    E/A ratio 01/04/2023 1.26   Final    Mean e' 01/04/2023 0.08  m/s Final    LVOT area 01/04/2023 3.1  cm2 Final    LVOT stroke volume 01/04/2023 70.34  cm3 Final    AV peak  gradient 01/04/2023 8  mmHg Final    E/E' ratio 01/04/2023 11.60  m/s Final    MV Peak E Cel[place] 01/04/2023 0.87  m/s Final    AR Max Cel[place] 01/04/2023 3.39  m/s Final    LV Systolic Volume 01/04/2023 30.10  mL Final    LV Systolic Volume Index 01/04/2023 19.8  mL/m2 Final    LV Diastolic Volume 01/04/2023 70.80  mL Final    LV Diastolic Volume Index 01/04/2023 46.58  mL/m2 Final    LV Mass Index 01/04/2023 58  g/m2 Final    EF 01/04/2023 65  % Final       History reviewed. No pertinent past medical history.  Social History     Socioeconomic History    Marital status:    Tobacco Use    Smoking status: Former    Smokeless tobacco: Never   Substance and Sexual Activity    Alcohol use: Yes    Drug use: Never     Social Determinants of Health     Financial Resource Strain: Unknown    Difficulty of Paying Living Expenses: Patient refused   Food Insecurity: Unknown    Worried About Running Out of Food in the Last Year: Patient refused    Ran Out of Food in the Last Year: Patient refused   Transportation Needs: Unknown    Lack of Transportation (Medical): Patient refused    Lack of Transportation (Non-Medical): Patient refused   Physical Activity: Unknown    Days of Exercise per Week: Patient refused    Minutes of Exercise per Session: 30 min   Stress: Unknown    Feeling of Stress : Patient refused   Social Connections: Unknown    Frequency of Communication with Friends and Family: Patient refused    Frequency of Social Gatherings with Friends and Family: Patient refused    Active Member of Clubs or Organizations: Patient refused    Attends Club or Organization Meetings: Patient refused    Marital Status: Patient refused   Housing Stability: Unknown    Unable to Pay for Housing in the Last Year: Patient refused    Number of Places Lived in the Last Year: 1    Unstable Housing in the Last Year: Patient refused     Past Surgical History:   Procedure Laterality Date    BREAST CYST ASPIRATION Left     left wrist      arthritis     TUBAL LIGATION       Family History   Problem Relation Age of Onset    Eczema Neg Hx     Lupus Neg Hx     Psoriasis Neg Hx     Melanoma Neg Hx        Review of patient's allergies indicates:  No Known Allergies    Current Outpatient Medications:     ascorbate calcium, vitamin C, 500 mg Tab, Vitamin C 500 mg tablet  Take 1 tablet every day by oral route., Disp: , Rfl:     calcium-vitamin D3 (OS-FERNANDA 500 + D3) 500 mg-5 mcg (200 unit) per tablet, Take 1 tablet by mouth 2 (two) times daily with meals., Disp: , Rfl:     magnesium 200 mg Tab, magnesium, Disp: , Rfl:     methylPREDNISolone (MEDROL DOSEPACK) 4 mg tablet, use as directed, Disp: 21 each, Rfl: 0    multivit-min-iron-FA-K.gin 18 mg iron-400 mcg-50 mg Tab, Take 1 tablet by mouth once daily. , Disp: , Rfl:     mupirocin (BACTROBAN) 2 % ointment, Apply topically 3 (three) times daily., Disp: 30 g, Rfl: 0    [START ON 4/20/2023] pantoprazole (PROTONIX) 40 MG tablet, Take 1 tablet (40 mg total) by mouth once daily., Disp: 90 tablet, Rfl: 1    rosuvastatin (CRESTOR) 5 MG tablet, Take 1 tablet (5 mg total) by mouth once daily., Disp: 90 tablet, Rfl: 1    sertraline (ZOLOFT) 50 MG tablet, Take 1 tablet (50 mg total) by mouth once daily., Disp: 90 tablet, Rfl: 0    tiZANidine (ZANAFLEX) 4 MG tablet, Take 1 tablet (4 mg total) by mouth every 6 (six) hours as needed., Disp: 30 tablet, Rfl: 0    Review of Systems   Constitutional:  Negative for chills, fever and unexpected weight change.   HENT:  Negative for ear pain, rhinorrhea and sore throat.    Eyes:  Negative for pain and visual disturbance.   Respiratory:  Negative for cough and shortness of breath.    Cardiovascular:  Negative for chest pain, palpitations and leg swelling.   Gastrointestinal:  Negative for abdominal pain, diarrhea, nausea and vomiting.   Genitourinary:  Negative for difficulty urinating, hematuria and vaginal bleeding.   Musculoskeletal:  Negative for arthralgias.   Skin:  Negative for rash.  "  Neurological:  Positive for dizziness and headaches. Negative for weakness.   Psychiatric/Behavioral:  Negative for agitation and sleep disturbance. The patient is not nervous/anxious.         Objective:      Vitals:    02/15/23 1251   BP: 120/70   Pulse: 76   SpO2: 99%   Weight: 50.7 kg (111 lb 12.8 oz)   Height: 5' 3" (1.6 m)     Physical Exam  Vitals and nursing note reviewed.   Constitutional:       Appearance: Normal appearance. She is well-developed.   HENT:      Head: Normocephalic.      Right Ear: External ear normal.      Left Ear: External ear normal.   Eyes:      Conjunctiva/sclera: Conjunctivae normal.      Pupils: Pupils are equal, round, and reactive to light.   Neck:      Vascular: No JVD.   Cardiovascular:      Rate and Rhythm: Normal rate and regular rhythm.      Heart sounds: No murmur heard.  Pulmonary:      Effort: Pulmonary effort is normal.      Breath sounds: Normal breath sounds.   Abdominal:      General: Bowel sounds are normal.      Palpations: Abdomen is soft.   Musculoskeletal:         General: No deformity. Normal range of motion.      Cervical back: Normal range of motion and neck supple. Spasms present. Normal range of motion.   Lymphadenopathy:      Cervical: No cervical adenopathy.   Skin:     General: Skin is warm and dry.      Findings: No rash.   Neurological:      Mental Status: She is alert and oriented to person, place, and time.      Gait: Gait normal.   Psychiatric:         Speech: Speech normal.         Behavior: Behavior normal.         Assessment:       1. Nonintractable headache, unspecified chronicity pattern, unspecified headache type    2. Major depressive disorder with single episode, in remission    3. Gastroesophageal reflux disease, unspecified whether esophagitis present    4. Cervical radiculopathy    5. Sciatica, unspecified laterality    6. Arthralgia, unspecified joint           Plan:       Nonintractable headache, unspecified chronicity pattern, " unspecified headache type  Comments:  mri if symptoms do not resolve xray of cervical spine if pain persists  Orders:  -     MRI Brain Without Contrast; Future; Expected date: 02/15/2023    Major depressive disorder with single episode, in remission  -     sertraline (ZOLOFT) 50 MG tablet; Take 1 tablet (50 mg total) by mouth once daily.  Dispense: 90 tablet; Refill: 0    Gastroesophageal reflux disease, unspecified whether esophagitis present  -     pantoprazole (PROTONIX) 40 MG tablet; Take 1 tablet (40 mg total) by mouth once daily.  Dispense: 90 tablet; Refill: 1    Cervical radiculopathy  -     methylPREDNISolone (MEDROL DOSEPACK) 4 mg tablet; use as directed  Dispense: 21 each; Refill: 0  -     tiZANidine (ZANAFLEX) 4 MG tablet; Take 1 tablet (4 mg total) by mouth every 6 (six) hours as needed.  Dispense: 30 tablet; Refill: 0    Sciatica, unspecified laterality    Arthralgia, unspecified joint      Follow up in about 3 months (around 5/15/2023), or if symptoms worsen or fail to improve, for medication management.        2/25/2023 Bonnie Arita

## 2023-02-27 ENCOUNTER — TELEPHONE (OUTPATIENT)
Dept: FAMILY MEDICINE | Facility: CLINIC | Age: 73
End: 2023-02-27

## 2023-02-27 NOTE — TELEPHONE ENCOUNTER
----- Message from Bonnie Arita NP sent at 2/25/2023 12:02 AM CST -----  Labs are within normal limits . Continue as is.

## 2023-05-23 ENCOUNTER — TELEPHONE (OUTPATIENT)
Dept: FAMILY MEDICINE | Facility: CLINIC | Age: 73
End: 2023-05-23

## 2023-05-23 NOTE — TELEPHONE ENCOUNTER
----- Message from Matthew Roque MA sent at 5/23/2023  2:22 PM CDT -----  Regarding: appt request  Pt would like to be seen for a check up and lab work. [phone]

## 2023-05-29 ENCOUNTER — TELEPHONE (OUTPATIENT)
Dept: FAMILY MEDICINE | Facility: CLINIC | Age: 73
End: 2023-05-29

## 2023-05-29 NOTE — TELEPHONE ENCOUNTER
----- Message from Shawnee Munoz MA sent at 5/29/2023 11:28 AM CDT -----  Regarding: worsening stomach issues  Complains of worsening stomach issues. Has appt on 6/15/23 but would like to be seen sooner.  [phone]

## 2023-05-29 NOTE — TELEPHONE ENCOUNTER
"Spoke with patient, states the issues are "kind of the same as I always get" States she feels like the symptoms are just lasting a little longer. Abdomen is sensitive in the middle and sometimes it feels like her ribs are sore. States it's been ongoing for the last month. She states she isn't sure what is causing this or if she should have an ultrasound or some lab work.  "

## 2023-05-31 ENCOUNTER — OFFICE VISIT (OUTPATIENT)
Dept: FAMILY MEDICINE | Facility: CLINIC | Age: 73
End: 2023-05-31
Payer: MEDICARE

## 2023-05-31 VITALS
HEIGHT: 63 IN | WEIGHT: 110.63 LBS | DIASTOLIC BLOOD PRESSURE: 70 MMHG | BODY MASS INDEX: 19.6 KG/M2 | OXYGEN SATURATION: 99 % | SYSTOLIC BLOOD PRESSURE: 110 MMHG | HEART RATE: 66 BPM

## 2023-05-31 DIAGNOSIS — R11.0 NAUSEA: ICD-10-CM

## 2023-05-31 DIAGNOSIS — E78.5 DYSLIPIDEMIA: Primary | ICD-10-CM

## 2023-05-31 DIAGNOSIS — E78.5 HYPERLIPIDEMIA, UNSPECIFIED HYPERLIPIDEMIA TYPE: ICD-10-CM

## 2023-05-31 DIAGNOSIS — R10.9 ABDOMINAL PAIN, UNSPECIFIED ABDOMINAL LOCATION: ICD-10-CM

## 2023-05-31 DIAGNOSIS — F32.5 MAJOR DEPRESSIVE DISORDER WITH SINGLE EPISODE, IN REMISSION: ICD-10-CM

## 2023-05-31 DIAGNOSIS — K21.9 GASTROESOPHAGEAL REFLUX DISEASE, UNSPECIFIED WHETHER ESOPHAGITIS PRESENT: ICD-10-CM

## 2023-05-31 PROCEDURE — 1101F PR PT FALLS ASSESS DOC 0-1 FALLS W/OUT INJ PAST YR: ICD-10-PCS | Mod: CPTII,S$GLB,, | Performed by: NURSE PRACTITIONER

## 2023-05-31 PROCEDURE — 1160F RVW MEDS BY RX/DR IN RCRD: CPT | Mod: CPTII,S$GLB,, | Performed by: NURSE PRACTITIONER

## 2023-05-31 PROCEDURE — 99214 PR OFFICE/OUTPT VISIT, EST, LEVL IV, 30-39 MIN: ICD-10-PCS | Mod: S$GLB,,, | Performed by: NURSE PRACTITIONER

## 2023-05-31 PROCEDURE — 3008F BODY MASS INDEX DOCD: CPT | Mod: CPTII,S$GLB,, | Performed by: NURSE PRACTITIONER

## 2023-05-31 PROCEDURE — 3078F PR MOST RECENT DIASTOLIC BLOOD PRESSURE < 80 MM HG: ICD-10-PCS | Mod: CPTII,S$GLB,, | Performed by: NURSE PRACTITIONER

## 2023-05-31 PROCEDURE — 3074F PR MOST RECENT SYSTOLIC BLOOD PRESSURE < 130 MM HG: ICD-10-PCS | Mod: CPTII,S$GLB,, | Performed by: NURSE PRACTITIONER

## 2023-05-31 PROCEDURE — 1159F PR MEDICATION LIST DOCUMENTED IN MEDICAL RECORD: ICD-10-PCS | Mod: CPTII,S$GLB,, | Performed by: NURSE PRACTITIONER

## 2023-05-31 PROCEDURE — 3288F PR FALLS RISK ASSESSMENT DOCUMENTED: ICD-10-PCS | Mod: CPTII,S$GLB,, | Performed by: NURSE PRACTITIONER

## 2023-05-31 PROCEDURE — 3074F SYST BP LT 130 MM HG: CPT | Mod: CPTII,S$GLB,, | Performed by: NURSE PRACTITIONER

## 2023-05-31 PROCEDURE — 3288F FALL RISK ASSESSMENT DOCD: CPT | Mod: CPTII,S$GLB,, | Performed by: NURSE PRACTITIONER

## 2023-05-31 PROCEDURE — 1101F PT FALLS ASSESS-DOCD LE1/YR: CPT | Mod: CPTII,S$GLB,, | Performed by: NURSE PRACTITIONER

## 2023-05-31 PROCEDURE — 1160F PR REVIEW ALL MEDS BY PRESCRIBER/CLIN PHARMACIST DOCUMENTED: ICD-10-PCS | Mod: CPTII,S$GLB,, | Performed by: NURSE PRACTITIONER

## 2023-05-31 PROCEDURE — 3008F PR BODY MASS INDEX (BMI) DOCUMENTED: ICD-10-PCS | Mod: CPTII,S$GLB,, | Performed by: NURSE PRACTITIONER

## 2023-05-31 PROCEDURE — 1159F MED LIST DOCD IN RCRD: CPT | Mod: CPTII,S$GLB,, | Performed by: NURSE PRACTITIONER

## 2023-05-31 PROCEDURE — 3078F DIAST BP <80 MM HG: CPT | Mod: CPTII,S$GLB,, | Performed by: NURSE PRACTITIONER

## 2023-05-31 PROCEDURE — 99214 OFFICE O/P EST MOD 30 MIN: CPT | Mod: S$GLB,,, | Performed by: NURSE PRACTITIONER

## 2023-05-31 RX ORDER — SERTRALINE HYDROCHLORIDE 50 MG/1
50 TABLET, FILM COATED ORAL DAILY
Qty: 90 TABLET | Refills: 0 | Status: SHIPPED | OUTPATIENT
Start: 2023-05-31 | End: 2023-08-30 | Stop reason: SDUPTHER

## 2023-05-31 RX ORDER — ROSUVASTATIN CALCIUM 5 MG/1
5 TABLET, COATED ORAL DAILY
Qty: 90 TABLET | Refills: 1 | Status: SHIPPED | OUTPATIENT
Start: 2023-05-31 | End: 2023-11-26 | Stop reason: SDUPTHER

## 2023-05-31 RX ORDER — FAMOTIDINE 40 MG/1
40 TABLET, FILM COATED ORAL DAILY
Qty: 30 TABLET | Refills: 11 | Status: SHIPPED | OUTPATIENT
Start: 2023-05-31 | End: 2024-05-30

## 2023-06-03 ENCOUNTER — HOSPITAL ENCOUNTER (OUTPATIENT)
Dept: RADIOLOGY | Facility: HOSPITAL | Age: 73
Discharge: HOME | End: 2023-06-03
Attending: NURSE PRACTITIONER
Payer: MEDICARE

## 2023-06-03 DIAGNOSIS — R10.9 ABDOMINAL PAIN, UNSPECIFIED ABDOMINAL LOCATION: ICD-10-CM

## 2023-06-03 DIAGNOSIS — R11.0 NAUSEA: ICD-10-CM

## 2023-06-03 PROCEDURE — 76700 US EXAM ABDOM COMPLETE: CPT | Mod: TC

## 2023-06-03 PROCEDURE — 76700 US ABDOMEN COMPLETE: ICD-10-PCS | Mod: 26,,, | Performed by: RADIOLOGY

## 2023-06-03 PROCEDURE — 76700 US EXAM ABDOM COMPLETE: CPT | Mod: 26,,, | Performed by: RADIOLOGY

## 2023-06-12 ENCOUNTER — TELEPHONE (OUTPATIENT)
Dept: FAMILY MEDICINE | Facility: CLINIC | Age: 73
End: 2023-06-12

## 2023-06-12 DIAGNOSIS — R10.9 ABDOMINAL PAIN, UNSPECIFIED ABDOMINAL LOCATION: Primary | ICD-10-CM

## 2023-06-12 NOTE — TELEPHONE ENCOUNTER
----- Message from Bonnie Arita NP sent at 6/12/2023  3:10 AM CDT -----  Ultrasound shows no acute findings. Incidentally simple cysts on liver and kidney.

## 2023-06-12 NOTE — TELEPHONE ENCOUNTER
Spoke to patient with results verbatim per Bonnie. Verbalized understanding. Wants to know what to do now, see gastro per patient? Also has an appointment with Bonnie 6/15. Wants to know if she needs to keep that.

## 2023-08-25 ENCOUNTER — PATIENT MESSAGE (OUTPATIENT)
Dept: FAMILY MEDICINE | Facility: CLINIC | Age: 73
End: 2023-08-25

## 2023-08-28 ENCOUNTER — PATIENT MESSAGE (OUTPATIENT)
Dept: FAMILY MEDICINE | Facility: CLINIC | Age: 73
End: 2023-08-28

## 2023-08-28 DIAGNOSIS — E78.5 HYPERLIPIDEMIA, UNSPECIFIED HYPERLIPIDEMIA TYPE: ICD-10-CM

## 2023-08-28 DIAGNOSIS — Z79.899 HIGH RISK MEDICATION USE: Primary | ICD-10-CM

## 2023-08-30 ENCOUNTER — PATIENT MESSAGE (OUTPATIENT)
Dept: FAMILY MEDICINE | Facility: CLINIC | Age: 73
End: 2023-08-30

## 2023-08-30 DIAGNOSIS — F32.5 MAJOR DEPRESSIVE DISORDER WITH SINGLE EPISODE, IN REMISSION: ICD-10-CM

## 2023-08-30 RX ORDER — SERTRALINE HYDROCHLORIDE 50 MG/1
50 TABLET, FILM COATED ORAL DAILY
Qty: 90 TABLET | Refills: 0 | Status: SHIPPED | OUTPATIENT
Start: 2023-08-30 | End: 2023-11-26 | Stop reason: SDUPTHER

## 2023-09-01 LAB
ALBUMIN SERPL-MCNC: 4.4 G/DL (ref 3.6–5.1)
ALBUMIN/GLOB SERPL: 1.8 (CALC) (ref 1–2.5)
ALP SERPL-CCNC: 68 U/L (ref 37–153)
ALT SERPL-CCNC: 16 U/L (ref 6–29)
AST SERPL-CCNC: 21 U/L (ref 10–35)
BILIRUB SERPL-MCNC: 0.8 MG/DL (ref 0.2–1.2)
BUN SERPL-MCNC: 22 MG/DL (ref 7–25)
BUN/CREAT SERPL: 39 (CALC) (ref 6–22)
CALCIUM SERPL-MCNC: 9.5 MG/DL (ref 8.6–10.4)
CHLORIDE SERPL-SCNC: 98 MMOL/L (ref 98–110)
CHOLEST SERPL-MCNC: 176 MG/DL
CHOLEST/HDLC SERPL: 2.3 (CALC)
CO2 SERPL-SCNC: 29 MMOL/L (ref 20–32)
CREAT SERPL-MCNC: 0.57 MG/DL (ref 0.6–1)
EGFR: 97 ML/MIN/1.73M2
GLOBULIN SER CALC-MCNC: 2.5 G/DL (CALC) (ref 1.9–3.7)
GLUCOSE SERPL-MCNC: 86 MG/DL (ref 65–99)
HDLC SERPL-MCNC: 78 MG/DL
LDLC SERPL CALC-MCNC: 81 MG/DL (CALC)
NONHDLC SERPL-MCNC: 98 MG/DL (CALC)
POTASSIUM SERPL-SCNC: 4.1 MMOL/L (ref 3.5–5.3)
PROT SERPL-MCNC: 6.9 G/DL (ref 6.1–8.1)
SODIUM SERPL-SCNC: 135 MMOL/L (ref 135–146)
TRIGL SERPL-MCNC: 88 MG/DL
TSH SERPL-ACNC: 1.84 MIU/L (ref 0.4–4.5)

## 2023-09-07 ENCOUNTER — TELEPHONE (OUTPATIENT)
Dept: FAMILY MEDICINE | Facility: CLINIC | Age: 73
End: 2023-09-07

## 2023-09-07 NOTE — TELEPHONE ENCOUNTER
Spoke with pt in regards to recent lab results. Verbalized per Bonnie that pt labs are within normal limits. Pt acknowledge understanding.

## 2023-09-07 NOTE — TELEPHONE ENCOUNTER
----- Message from Bonnie Arita NP sent at 9/7/2023  1:12 AM CDT -----  Labs are within normal limits

## 2023-09-20 ENCOUNTER — OFFICE VISIT (OUTPATIENT)
Dept: FAMILY MEDICINE | Facility: CLINIC | Age: 73
End: 2023-09-20
Payer: MEDICARE

## 2023-09-20 DIAGNOSIS — M50.30 DDD (DEGENERATIVE DISC DISEASE), CERVICAL: ICD-10-CM

## 2023-09-20 DIAGNOSIS — R42 DIZZINESS: ICD-10-CM

## 2023-09-20 DIAGNOSIS — Z23 ENCOUNTER FOR ADMINISTRATION OF VACCINE: Primary | ICD-10-CM

## 2023-09-20 DIAGNOSIS — M54.30 SCIATICA, UNSPECIFIED LATERALITY: ICD-10-CM

## 2023-09-20 DIAGNOSIS — E78.5 DYSLIPIDEMIA: ICD-10-CM

## 2023-09-20 DIAGNOSIS — F32.5 MAJOR DEPRESSIVE DISORDER WITH SINGLE EPISODE, IN REMISSION: ICD-10-CM

## 2023-09-20 DIAGNOSIS — R09.89 OTHER SPECIFIED SYMPTOMS AND SIGNS INVOLVING THE CIRCULATORY AND RESPIRATORY SYSTEMS: ICD-10-CM

## 2023-09-20 DIAGNOSIS — K21.9 GASTROESOPHAGEAL REFLUX DISEASE, UNSPECIFIED WHETHER ESOPHAGITIS PRESENT: ICD-10-CM

## 2023-09-20 PROCEDURE — 1160F PR REVIEW ALL MEDS BY PRESCRIBER/CLIN PHARMACIST DOCUMENTED: ICD-10-PCS | Mod: CPTII,S$GLB,, | Performed by: NURSE PRACTITIONER

## 2023-09-20 PROCEDURE — 1159F PR MEDICATION LIST DOCUMENTED IN MEDICAL RECORD: ICD-10-PCS | Mod: CPTII,S$GLB,, | Performed by: NURSE PRACTITIONER

## 2023-09-20 PROCEDURE — 1101F PT FALLS ASSESS-DOCD LE1/YR: CPT | Mod: CPTII,S$GLB,, | Performed by: NURSE PRACTITIONER

## 2023-09-20 PROCEDURE — G0008 FLU VACCINE - QUADRIVALENT - ADJUVANTED: ICD-10-PCS | Mod: S$GLB,,, | Performed by: NURSE PRACTITIONER

## 2023-09-20 PROCEDURE — 99214 PR OFFICE/OUTPT VISIT, EST, LEVL IV, 30-39 MIN: ICD-10-PCS | Mod: S$GLB,,, | Performed by: NURSE PRACTITIONER

## 2023-09-20 PROCEDURE — G0008 ADMIN INFLUENZA VIRUS VAC: HCPCS | Mod: S$GLB,,, | Performed by: NURSE PRACTITIONER

## 2023-09-20 PROCEDURE — 3288F PR FALLS RISK ASSESSMENT DOCUMENTED: ICD-10-PCS | Mod: CPTII,S$GLB,, | Performed by: NURSE PRACTITIONER

## 2023-09-20 PROCEDURE — 90694 VACC AIIV4 NO PRSRV 0.5ML IM: CPT | Mod: S$GLB,,, | Performed by: NURSE PRACTITIONER

## 2023-09-20 PROCEDURE — 1160F RVW MEDS BY RX/DR IN RCRD: CPT | Mod: CPTII,S$GLB,, | Performed by: NURSE PRACTITIONER

## 2023-09-20 PROCEDURE — 3288F FALL RISK ASSESSMENT DOCD: CPT | Mod: CPTII,S$GLB,, | Performed by: NURSE PRACTITIONER

## 2023-09-20 PROCEDURE — 99214 OFFICE O/P EST MOD 30 MIN: CPT | Mod: S$GLB,,, | Performed by: NURSE PRACTITIONER

## 2023-09-20 PROCEDURE — 90694 FLU VACCINE - QUADRIVALENT - ADJUVANTED: ICD-10-PCS | Mod: S$GLB,,, | Performed by: NURSE PRACTITIONER

## 2023-09-20 PROCEDURE — 1101F PR PT FALLS ASSESS DOC 0-1 FALLS W/OUT INJ PAST YR: ICD-10-PCS | Mod: CPTII,S$GLB,, | Performed by: NURSE PRACTITIONER

## 2023-09-20 PROCEDURE — 1159F MED LIST DOCD IN RCRD: CPT | Mod: CPTII,S$GLB,, | Performed by: NURSE PRACTITIONER

## 2023-09-20 NOTE — PROGRESS NOTES
SUBJECTIVE:    Patient ID: Sylvia Maxwell is a 72 y.o. female.    Chief Complaint: Back Pain (Pt c/o ongoing back pain. )    72y.o. female who presents today for urgent visit. She is treated regularly for depression, dyslipidemia and gerd.  She is complaining of back pain. Seems like back  pain is becoming more frequent. Pain does not radiate. Denies injury or trauma. Has tried otc meds with some improvement. Had mri last august. Did have some degenerative changes. No loss of bowel or bladder function.       Patient Message on 08/28/2023   Component Date Value Ref Range Status    Glucose 08/31/2023 86  65 - 99 mg/dL Final    BUN 08/31/2023 22  7 - 25 mg/dL Final    Creatinine 08/31/2023 0.57 (L)  0.60 - 1.00 mg/dL Final    eGFR 08/31/2023 97  > OR = 60 mL/min/1.73m2 Final    BUN/Creatinine Ratio 08/31/2023 39 (H)  6 - 22 (calc) Final    Sodium 08/31/2023 135  135 - 146 mmol/L Final    Potassium 08/31/2023 4.1  3.5 - 5.3 mmol/L Final    Chloride 08/31/2023 98  98 - 110 mmol/L Final    CO2 08/31/2023 29  20 - 32 mmol/L Final    Calcium 08/31/2023 9.5  8.6 - 10.4 mg/dL Final    Total Protein 08/31/2023 6.9  6.1 - 8.1 g/dL Final    Albumin 08/31/2023 4.4  3.6 - 5.1 g/dL Final    Globulin, Total 08/31/2023 2.5  1.9 - 3.7 g/dL (calc) Final    Albumin/Globulin Ratio 08/31/2023 1.8  1.0 - 2.5 (calc) Final    Total Bilirubin 08/31/2023 0.8  0.2 - 1.2 mg/dL Final    Alkaline Phosphatase 08/31/2023 68  37 - 153 U/L Final    AST 08/31/2023 21  10 - 35 U/L Final    ALT 08/31/2023 16  6 - 29 U/L Final    Cholesterol 08/31/2023 176  <200 mg/dL Final    HDL 08/31/2023 78  > OR = 50 mg/dL Final    Triglycerides 08/31/2023 88  <150 mg/dL Final    LDL Cholesterol 08/31/2023 81  mg/dL (calc) Final    HDL/Cholesterol Ratio 08/31/2023 2.3  <5.0 (calc) Final    Non HDL Chol. (LDL+VLDL) 08/31/2023 98  <130 mg/dL (calc) Final    TSH w/reflex to FT4 08/31/2023 1.84  0.40 - 4.50 mIU/L Final       History reviewed. No pertinent past medical  history.  Social History     Socioeconomic History    Marital status:    Tobacco Use    Smoking status: Former    Smokeless tobacco: Never   Substance and Sexual Activity    Alcohol use: Yes    Drug use: Never     Social Determinants of Health     Financial Resource Strain: Unknown (9/16/2023)    Overall Financial Resource Strain (CARDIA)     Difficulty of Paying Living Expenses: Patient refused   Food Insecurity: Unknown (9/16/2023)    Hunger Vital Sign     Worried About Running Out of Food in the Last Year: Patient refused     Ran Out of Food in the Last Year: Patient refused   Transportation Needs: Unknown (9/16/2023)    PRAPARE - Transportation     Lack of Transportation (Medical): Patient refused     Lack of Transportation (Non-Medical): Patient refused   Physical Activity: Unknown (9/16/2023)    Exercise Vital Sign     Days of Exercise per Week: Patient refused     Minutes of Exercise per Session: 30 min   Stress: Unknown (9/16/2023)    [employer] - Occupational Stress Questionnaire     Feeling of Stress : Patient refused   Social Connections: Unknown (9/16/2023)    Social Connection and Isolation Panel [NHANES]     Frequency of Communication with Friends and Family: Patient refused     Frequency of Social Gatherings with Friends and Family: Patient refused     Active Member of Clubs or Organizations: Patient refused     Attends Club or Organization Meetings: Patient refused     Marital Status: Patient refused   Housing Stability: Unknown (9/16/2023)    Housing Stability Vital Sign     Unable to Pay for Housing in the Last Year: Patient refused     Number of Places Lived in the Last Year: 1     Unstable Housing in the Last Year: Patient refused     Past Surgical History:   Procedure Laterality Date    BREAST CYST ASPIRATION Left     left wrist      arthritis    TUBAL LIGATION       Family History   Problem Relation Age of Onset    Eczema Neg Hx     Lupus Neg Hx      "Psoriasis Neg Hx     Melanoma Neg Hx        All of your core healthy metrics are met.      Review of patient's allergies indicates:  No Known Allergies    Current Outpatient Medications:     ascorbate calcium, vitamin C, 500 mg Tab, Vitamin C 500 mg tablet  Take 1 tablet every day by oral route., Disp: , Rfl:     calcium-vitamin D3 (OS-FERNANDA 500 + D3) 500 mg-5 mcg (200 unit) per tablet, Take 1 tablet by mouth 2 (two) times daily with meals., Disp: , Rfl:     famotidine (PEPCID) 40 MG tablet, Take 1 tablet (40 mg total) by mouth once daily., Disp: 30 tablet, Rfl: 11    magnesium 200 mg Tab, magnesium, Disp: , Rfl:     multivit-min-iron-FA-K.gin 18 mg iron-400 mcg-50 mg Tab, Take 1 tablet by mouth once daily. , Disp: , Rfl:     rosuvastatin (CRESTOR) 5 MG tablet, Take 1 tablet (5 mg total) by mouth once daily., Disp: 90 tablet, Rfl: 1    sertraline (ZOLOFT) 50 MG tablet, Take 1 tablet (50 mg total) by mouth once daily., Disp: 90 tablet, Rfl: 0    pantoprazole (PROTONIX) 40 MG tablet, Take 1 tablet (40 mg total) by mouth once daily., Disp: 90 tablet, Rfl: 1    Review of Systems   Constitutional:  Negative for activity change, chills and fever.   Respiratory:  Negative for cough and shortness of breath.    Cardiovascular:  Negative for chest pain.   Gastrointestinal:  Negative for abdominal pain, diarrhea, nausea and vomiting.   Genitourinary:  Negative for difficulty urinating and flank pain.   Musculoskeletal:  Negative for back pain and gait problem.   Neurological:  Negative for dizziness and weakness.          Objective:      Vitals:    09/20/23 1311   BP: (P) 98/62   Resp: (P) 16   SpO2: (P) 97%   Weight: (P) 51.8 kg (114 lb 3.2 oz)   Height: (P) 5' 3" (1.6 m)     Physical Exam  Vitals and nursing note reviewed.   Constitutional:       General: She is not in acute distress.     Appearance: Normal appearance. She is well-developed.   Cardiovascular:      Rate and Rhythm: Normal rate and regular rhythm.      Heart " sounds: No murmur heard.     No friction rub. No gallop.   Pulmonary:      Breath sounds: Normal breath sounds. No wheezing or rales.   Musculoskeletal:      Lumbar back: Tenderness present. No spasms or bony tenderness. Decreased range of motion. Negative right straight leg raise test and negative left straight leg raise test.      Comments: Negative bilat SLR   Skin:     Findings: No rash.   Neurological:      Mental Status: She is alert and oriented to person, place, and time.      Sensory: No sensory deficit.      Gait: Gait normal.           Assessment:       1. Encounter for administration of vaccine    2. DDD (degenerative disc disease), cervical    3. Major depressive disorder with single episode, in remission    4. Gastroesophageal reflux disease, unspecified whether esophagitis present    5. Dyslipidemia    6. Sciatica, unspecified laterality    7. Dizziness    8. Other specified symptoms and signs involving the circulatory and respiratory systems         Plan:       Encounter for administration of vaccine  -     Influenza (FLUAD) - Quadrivalent (Adjuvanted) *Preferred* (65+) (PF)    DDD (degenerative disc disease), cervical  Comments:  refer to dr. avila    Major depressive disorder with single episode, in remission  Comments:  refer to zoloft    Gastroesophageal reflux disease, unspecified whether esophagitis present  Comments:  protonix    Dyslipidemia  Comments:  crestor    Sciatica, unspecified laterality  -     Ambulatory referral/consult to Pain Clinic; Future; Expected date: 09/27/2023    Dizziness  -     US Carotid Bilateral; Future; Expected date: 09/20/2023    Other specified symptoms and signs involving the circulatory and respiratory systems  -     US Carotid Bilateral; Future; Expected date: 09/20/2023      Follow up in about 3 months (around 12/20/2023), or if symptoms worsen or fail to improve, for medication management.        10/2/2023 Bonnie Arita

## 2023-10-02 ENCOUNTER — HOSPITAL ENCOUNTER (OUTPATIENT)
Dept: RADIOLOGY | Facility: HOSPITAL | Age: 73
Discharge: HOME | End: 2023-10-02
Attending: NURSE PRACTITIONER
Payer: MEDICARE

## 2023-10-02 DIAGNOSIS — R42 DIZZINESS: ICD-10-CM

## 2023-10-02 DIAGNOSIS — R09.89 OTHER SPECIFIED SYMPTOMS AND SIGNS INVOLVING THE CIRCULATORY AND RESPIRATORY SYSTEMS: ICD-10-CM

## 2023-10-02 PROCEDURE — 93880 EXTRACRANIAL BILAT STUDY: CPT | Mod: TC,PO

## 2023-10-03 ENCOUNTER — PATIENT MESSAGE (OUTPATIENT)
Dept: FAMILY MEDICINE | Facility: CLINIC | Age: 73
End: 2023-10-03

## 2023-10-05 DIAGNOSIS — Z12.31 ENCOUNTER FOR SCREENING MAMMOGRAM FOR MALIGNANT NEOPLASM OF BREAST: Primary | ICD-10-CM

## 2023-10-18 ENCOUNTER — PATIENT MESSAGE (OUTPATIENT)
Dept: FAMILY MEDICINE | Facility: CLINIC | Age: 73
End: 2023-10-18

## 2023-10-18 DIAGNOSIS — K21.9 GASTROESOPHAGEAL REFLUX DISEASE, UNSPECIFIED WHETHER ESOPHAGITIS PRESENT: ICD-10-CM

## 2023-10-18 RX ORDER — PANTOPRAZOLE SODIUM 40 MG/1
40 TABLET, DELAYED RELEASE ORAL DAILY
Qty: 90 TABLET | Refills: 1 | Status: SHIPPED | OUTPATIENT
Start: 2023-10-18

## 2023-11-21 ENCOUNTER — HOSPITAL ENCOUNTER (OUTPATIENT)
Dept: RADIOLOGY | Facility: HOSPITAL | Age: 73
Discharge: HOME | End: 2023-11-21
Attending: SPECIALIST
Payer: MEDICARE

## 2023-11-21 VITALS — BODY MASS INDEX: 20.23 KG/M2 | WEIGHT: 114.19 LBS | HEIGHT: 63 IN

## 2023-11-21 DIAGNOSIS — Z12.31 ENCOUNTER FOR SCREENING MAMMOGRAM FOR MALIGNANT NEOPLASM OF BREAST: ICD-10-CM

## 2023-11-21 PROCEDURE — 77067 SCR MAMMO BI INCL CAD: CPT | Mod: TC,PO

## 2023-11-22 ENCOUNTER — TELEPHONE (OUTPATIENT)
Dept: FAMILY MEDICINE | Facility: CLINIC | Age: 73
End: 2023-11-22

## 2023-11-22 NOTE — TELEPHONE ENCOUNTER
Spoke with pt in regards to recent mammogram results. Verbalized per Bonnie that pt's mammogram negative. Pt acknowledged understanding.

## 2023-11-26 DIAGNOSIS — F32.5 MAJOR DEPRESSIVE DISORDER WITH SINGLE EPISODE, IN REMISSION: ICD-10-CM

## 2023-11-26 DIAGNOSIS — E78.5 HYPERLIPIDEMIA, UNSPECIFIED HYPERLIPIDEMIA TYPE: ICD-10-CM

## 2023-11-27 RX ORDER — SERTRALINE HYDROCHLORIDE 50 MG/1
50 TABLET, FILM COATED ORAL DAILY
Qty: 90 TABLET | Refills: 0 | Status: SHIPPED | OUTPATIENT
Start: 2023-11-27 | End: 2024-02-27 | Stop reason: SDUPTHER

## 2023-11-27 RX ORDER — ROSUVASTATIN CALCIUM 5 MG/1
5 TABLET, COATED ORAL DAILY
Qty: 90 TABLET | Refills: 1 | Status: SHIPPED | OUTPATIENT
Start: 2023-11-27 | End: 2024-11-26

## 2023-11-30 ENCOUNTER — OFFICE VISIT (OUTPATIENT)
Dept: FAMILY MEDICINE | Facility: CLINIC | Age: 73
End: 2023-11-30
Payer: MEDICARE

## 2023-11-30 VITALS
HEART RATE: 77 BPM | BODY MASS INDEX: 20.91 KG/M2 | OXYGEN SATURATION: 98 % | DIASTOLIC BLOOD PRESSURE: 64 MMHG | SYSTOLIC BLOOD PRESSURE: 100 MMHG | HEIGHT: 63 IN | WEIGHT: 118 LBS | RESPIRATION RATE: 16 BRPM

## 2023-11-30 DIAGNOSIS — F32.5 MAJOR DEPRESSIVE DISORDER WITH SINGLE EPISODE, IN REMISSION: ICD-10-CM

## 2023-11-30 DIAGNOSIS — K21.9 GASTROESOPHAGEAL REFLUX DISEASE, UNSPECIFIED WHETHER ESOPHAGITIS PRESENT: ICD-10-CM

## 2023-11-30 DIAGNOSIS — M50.30 DDD (DEGENERATIVE DISC DISEASE), CERVICAL: Primary | ICD-10-CM

## 2023-11-30 DIAGNOSIS — E78.5 DYSLIPIDEMIA: ICD-10-CM

## 2023-11-30 PROCEDURE — 1160F RVW MEDS BY RX/DR IN RCRD: CPT | Mod: CPTII,S$GLB,, | Performed by: NURSE PRACTITIONER

## 2023-11-30 PROCEDURE — 96372 PR INJECTION,THERAP/PROPH/DIAG2ST, IM OR SUBCUT: ICD-10-PCS | Mod: S$GLB,,, | Performed by: NURSE PRACTITIONER

## 2023-11-30 PROCEDURE — 1101F PT FALLS ASSESS-DOCD LE1/YR: CPT | Mod: CPTII,S$GLB,, | Performed by: NURSE PRACTITIONER

## 2023-11-30 PROCEDURE — 3078F PR MOST RECENT DIASTOLIC BLOOD PRESSURE < 80 MM HG: ICD-10-PCS | Mod: CPTII,S$GLB,, | Performed by: NURSE PRACTITIONER

## 2023-11-30 PROCEDURE — 3288F PR FALLS RISK ASSESSMENT DOCUMENTED: ICD-10-PCS | Mod: CPTII,S$GLB,, | Performed by: NURSE PRACTITIONER

## 2023-11-30 PROCEDURE — 1101F PR PT FALLS ASSESS DOC 0-1 FALLS W/OUT INJ PAST YR: ICD-10-PCS | Mod: CPTII,S$GLB,, | Performed by: NURSE PRACTITIONER

## 2023-11-30 PROCEDURE — 3008F PR BODY MASS INDEX (BMI) DOCUMENTED: ICD-10-PCS | Mod: CPTII,S$GLB,, | Performed by: NURSE PRACTITIONER

## 2023-11-30 PROCEDURE — 99214 PR OFFICE/OUTPT VISIT, EST, LEVL IV, 30-39 MIN: ICD-10-PCS | Mod: 25,S$GLB,, | Performed by: NURSE PRACTITIONER

## 2023-11-30 PROCEDURE — 3074F PR MOST RECENT SYSTOLIC BLOOD PRESSURE < 130 MM HG: ICD-10-PCS | Mod: CPTII,S$GLB,, | Performed by: NURSE PRACTITIONER

## 2023-11-30 PROCEDURE — 1160F PR REVIEW ALL MEDS BY PRESCRIBER/CLIN PHARMACIST DOCUMENTED: ICD-10-PCS | Mod: CPTII,S$GLB,, | Performed by: NURSE PRACTITIONER

## 2023-11-30 PROCEDURE — 1159F MED LIST DOCD IN RCRD: CPT | Mod: CPTII,S$GLB,, | Performed by: NURSE PRACTITIONER

## 2023-11-30 PROCEDURE — 3288F FALL RISK ASSESSMENT DOCD: CPT | Mod: CPTII,S$GLB,, | Performed by: NURSE PRACTITIONER

## 2023-11-30 PROCEDURE — 3008F BODY MASS INDEX DOCD: CPT | Mod: CPTII,S$GLB,, | Performed by: NURSE PRACTITIONER

## 2023-11-30 PROCEDURE — 1125F AMNT PAIN NOTED PAIN PRSNT: CPT | Mod: CPTII,S$GLB,, | Performed by: NURSE PRACTITIONER

## 2023-11-30 PROCEDURE — 3078F DIAST BP <80 MM HG: CPT | Mod: CPTII,S$GLB,, | Performed by: NURSE PRACTITIONER

## 2023-11-30 PROCEDURE — 1159F PR MEDICATION LIST DOCUMENTED IN MEDICAL RECORD: ICD-10-PCS | Mod: CPTII,S$GLB,, | Performed by: NURSE PRACTITIONER

## 2023-11-30 PROCEDURE — 99214 OFFICE O/P EST MOD 30 MIN: CPT | Mod: 25,S$GLB,, | Performed by: NURSE PRACTITIONER

## 2023-11-30 PROCEDURE — 96372 THER/PROPH/DIAG INJ SC/IM: CPT | Mod: S$GLB,,, | Performed by: NURSE PRACTITIONER

## 2023-11-30 PROCEDURE — 3074F SYST BP LT 130 MM HG: CPT | Mod: CPTII,S$GLB,, | Performed by: NURSE PRACTITIONER

## 2023-11-30 PROCEDURE — 1125F PR PAIN SEVERITY QUANTIFIED, PAIN PRESENT: ICD-10-PCS | Mod: CPTII,S$GLB,, | Performed by: NURSE PRACTITIONER

## 2023-11-30 RX ORDER — DEXAMETHASONE SODIUM PHOSPHATE 4 MG/ML
8 INJECTION, SOLUTION INTRA-ARTICULAR; INTRALESIONAL; INTRAMUSCULAR; INTRAVENOUS; SOFT TISSUE ONCE
Status: COMPLETED | OUTPATIENT
Start: 2023-11-30 | End: 2023-11-30

## 2023-11-30 RX ORDER — METHYLPREDNISOLONE 4 MG/1
TABLET ORAL
Qty: 21 EACH | Refills: 0 | Status: SHIPPED | OUTPATIENT
Start: 2023-11-30 | End: 2023-12-21

## 2023-11-30 RX ORDER — HYDROCODONE BITARTRATE AND ACETAMINOPHEN 5; 325 MG/1; MG/1
1 TABLET ORAL EVERY 8 HOURS PRN
Qty: 20 TABLET | Refills: 0 | Status: SHIPPED | OUTPATIENT
Start: 2023-11-30 | End: 2024-03-17

## 2023-11-30 RX ADMIN — DEXAMETHASONE SODIUM PHOSPHATE 8 MG: 4 INJECTION, SOLUTION INTRA-ARTICULAR; INTRALESIONAL; INTRAMUSCULAR; INTRAVENOUS; SOFT TISSUE at 02:11

## 2023-12-04 NOTE — PROGRESS NOTES
SUBJECTIVE:    Patient ID: Sylvia Maxwell is a 72 y.o. female.    Chief Complaint: Medication Management and Back Pain (Pt c/o lower back pain and bilateral leg pain mostly right . )    72y.o. female who presents today for follow up. She is treated regularly for depression, dyslipidemia and gerd.  She is complaining of back pain. Seems like back  pain is becoming more frequent. Had mri. Since last visit was seen by dr. Fields. He recommended juan. Patient did not want to do this. Pain is now radiating down both legs. No loss of bowel or bladder function.  ss of bowel or bladder function. Stress is manageable        Patient Message on 08/28/2023   Component Date Value Ref Range Status    Glucose 08/31/2023 86  65 - 99 mg/dL Final    BUN 08/31/2023 22  7 - 25 mg/dL Final    Creatinine 08/31/2023 0.57 (L)  0.60 - 1.00 mg/dL Final    eGFR 08/31/2023 97  > OR = 60 mL/min/1.73m2 Final    BUN/Creatinine Ratio 08/31/2023 39 (H)  6 - 22 (calc) Final    Sodium 08/31/2023 135  135 - 146 mmol/L Final    Potassium 08/31/2023 4.1  3.5 - 5.3 mmol/L Final    Chloride 08/31/2023 98  98 - 110 mmol/L Final    CO2 08/31/2023 29  20 - 32 mmol/L Final    Calcium 08/31/2023 9.5  8.6 - 10.4 mg/dL Final    Total Protein 08/31/2023 6.9  6.1 - 8.1 g/dL Final    Albumin 08/31/2023 4.4  3.6 - 5.1 g/dL Final    Globulin, Total 08/31/2023 2.5  1.9 - 3.7 g/dL (calc) Final    Albumin/Globulin Ratio 08/31/2023 1.8  1.0 - 2.5 (calc) Final    Total Bilirubin 08/31/2023 0.8  0.2 - 1.2 mg/dL Final    Alkaline Phosphatase 08/31/2023 68  37 - 153 U/L Final    AST 08/31/2023 21  10 - 35 U/L Final    ALT 08/31/2023 16  6 - 29 U/L Final    Cholesterol 08/31/2023 176  <200 mg/dL Final    HDL 08/31/2023 78  > OR = 50 mg/dL Final    Triglycerides 08/31/2023 88  <150 mg/dL Final    LDL Cholesterol 08/31/2023 81  mg/dL (calc) Final    HDL/Cholesterol Ratio 08/31/2023 2.3  <5.0 (calc) Final    Non HDL Chol. (LDL+VLDL) 08/31/2023 98  <130 mg/dL (calc) Final    TSH  w/reflex to FT4 08/31/2023 1.84  0.40 - 4.50 mIU/L Final       History reviewed. No pertinent past medical history.  Social History     Socioeconomic History    Marital status:    Tobacco Use    Smoking status: Former    Smokeless tobacco: Never   Substance and Sexual Activity    Alcohol use: Yes     Comment: ocassionally    Drug use: Never    Sexual activity: Not Currently     Social Determinants of Health     Financial Resource Strain: Unknown (11/25/2023)    Overall Financial Resource Strain (CARDIA)     Difficulty of Paying Living Expenses: Patient refused   Food Insecurity: Unknown (11/25/2023)    Hunger Vital Sign     Worried About Running Out of Food in the Last Year: Patient refused     Ran Out of Food in the Last Year: Patient refused   Transportation Needs: Unknown (11/25/2023)    PRAPARE - Transportation     Lack of Transportation (Medical): Patient refused     Lack of Transportation (Non-Medical): Patient refused   Physical Activity: Unknown (11/25/2023)    Exercise Vital Sign     Days of Exercise per Week: Patient refused     Minutes of Exercise per Session: 30 min   Stress: Unknown (11/25/2023)    [employer] - Occupational Stress Questionnaire     Feeling of Stress : Patient refused   Social Connections: Unknown (11/25/2023)    Social Connection and Isolation Panel [NHANES]     Frequency of Communication with Friends and Family: Patient refused     Frequency of Social Gatherings with Friends and Family: Patient refused     Active Member of Clubs or Organizations: Patient refused     Attends Club or Organization Meetings: Patient refused     Marital Status: Patient refused   Housing Stability: Unknown (11/25/2023)    Housing Stability Vital Sign     Unable to Pay for Housing in the Last Year: Patient refused     Number of Places Lived in the Last Year: 1     Unstable Housing in the Last Year: Patient refused     Past Surgical History:   Procedure Laterality Date     BREAST CYST ASPIRATION Left     left wrist      arthritis    TUBAL LIGATION       Family History   Problem Relation Age of Onset    Eczema Neg Hx     Lupus Neg Hx     Psoriasis Neg Hx     Melanoma Neg Hx        All of your core healthy metrics are met.      Review of patient's allergies indicates:  No Known Allergies    Current Outpatient Medications:     ascorbate calcium, vitamin C, 500 mg Tab, Vitamin C 500 mg tablet  Take 1 tablet every day by oral route., Disp: , Rfl:     calcium-vitamin D3 (OS-FERNANDA 500 + D3) 500 mg-5 mcg (200 unit) per tablet, Take 1 tablet by mouth 2 (two) times daily with meals., Disp: , Rfl:     famotidine (PEPCID) 40 MG tablet, Take 1 tablet (40 mg total) by mouth once daily., Disp: 30 tablet, Rfl: 11    magnesium 200 mg Tab, magnesium, Disp: , Rfl:     multivit-min-iron-FA-K.gin 18 mg iron-400 mcg-50 mg Tab, Take 1 tablet by mouth once daily. , Disp: , Rfl:     pantoprazole (PROTONIX) 40 MG tablet, Take 1 tablet (40 mg total) by mouth once daily., Disp: 90 tablet, Rfl: 1    rosuvastatin (CRESTOR) 5 MG tablet, Take 1 tablet (5 mg total) by mouth once daily., Disp: 90 tablet, Rfl: 1    sertraline (ZOLOFT) 50 MG tablet, Take 1 tablet (50 mg total) by mouth once daily., Disp: 90 tablet, Rfl: 0    HYDROcodone-acetaminophen (NORCO) 5-325 mg per tablet, Take 1 tablet by mouth every 8 (eight) hours as needed for Pain., Disp: 20 tablet, Rfl: 0    methylPREDNISolone (MEDROL DOSEPACK) 4 mg tablet, use as directed, Disp: 21 each, Rfl: 0    Review of Systems   Constitutional:  Negative for activity change, chills and fever.   Respiratory:  Negative for cough and shortness of breath.    Cardiovascular:  Negative for chest pain.   Gastrointestinal:  Negative for abdominal pain, diarrhea, nausea and vomiting.   Genitourinary:  Negative for difficulty urinating and flank pain.   Musculoskeletal:  Positive for back pain. Negative for gait problem.   Neurological:  Negative for dizziness and weakness.       "    Objective:      Vitals:    11/30/23 1328   BP: 100/64   Pulse: 77   Resp: 16   SpO2: 98%   Weight: 53.5 kg (118 lb)   Height: 5' 3" (1.6 m)     Physical Exam  Vitals and nursing note reviewed.   Constitutional:       General: She is not in acute distress.     Appearance: Normal appearance. She is well-developed.   Cardiovascular:      Rate and Rhythm: Normal rate and regular rhythm.      Heart sounds: No murmur heard.     No friction rub. No gallop.   Pulmonary:      Breath sounds: Normal breath sounds. No wheezing or rales.   Musculoskeletal:      Lumbar back: No spasms, tenderness or bony tenderness. Decreased range of motion. Positive right straight leg raise test and positive left straight leg raise test.      Comments: Negative bilat SLR   Skin:     Findings: No rash.   Neurological:      Mental Status: She is alert and oriented to person, place, and time.      Sensory: No sensory deficit.      Gait: Gait normal.           Assessment:       1. DDD (degenerative disc disease), cervical    2. Major depressive disorder with single episode, in remission    3. Dyslipidemia    4. Gastroesophageal reflux disease, unspecified whether esophagitis present         Plan:       DDD (degenerative disc disease), cervical  Comments:  medrol. follow up with dr. avila    Major depressive disorder with single episode, in remission  Comments:  continue zoloft    Dyslipidemia  Comments:  crestor    Gastroesophageal reflux disease, unspecified whether esophagitis present  Comments:  protonix    Other orders  -     dexAMETHasone injection 8 mg  -     methylPREDNISolone (MEDROL DOSEPACK) 4 mg tablet; use as directed  Dispense: 21 each; Refill: 0  -     HYDROcodone-acetaminophen (NORCO) 5-325 mg per tablet; Take 1 tablet by mouth every 8 (eight) hours as needed for Pain.  Dispense: 20 tablet; Refill: 0      Follow up in about 3 months (around 2/29/2024), or if symptoms worsen or fail to improve, for medication management.    "     12/4/2023 Bonnie Arita

## 2024-02-27 DIAGNOSIS — F32.5 MAJOR DEPRESSIVE DISORDER WITH SINGLE EPISODE, IN REMISSION: ICD-10-CM

## 2024-02-27 DIAGNOSIS — Z00.00 ENCOUNTER FOR MEDICARE ANNUAL WELLNESS EXAM: ICD-10-CM

## 2024-02-27 RX ORDER — SERTRALINE HYDROCHLORIDE 50 MG/1
50 TABLET, FILM COATED ORAL DAILY
Qty: 90 TABLET | Refills: 0 | Status: SHIPPED | OUTPATIENT
Start: 2024-02-27 | End: 2024-05-27 | Stop reason: SDUPTHER

## 2024-03-07 ENCOUNTER — TELEPHONE (OUTPATIENT)
Dept: FAMILY MEDICINE | Facility: CLINIC | Age: 74
End: 2024-03-07
Payer: MEDICARE

## 2024-03-07 NOTE — TELEPHONE ENCOUNTER
----- Message from Alena Jackson sent at 3/7/2024  3:16 PM CST -----   Pt has concerns about credential issues and trying to clarify if she should keep her appt.   [phone]

## 2024-03-13 ENCOUNTER — OFFICE VISIT (OUTPATIENT)
Dept: FAMILY MEDICINE | Facility: CLINIC | Age: 74
End: 2024-03-13
Payer: MEDICARE

## 2024-03-13 VITALS
WEIGHT: 118 LBS | HEIGHT: 62 IN | SYSTOLIC BLOOD PRESSURE: 92 MMHG | DIASTOLIC BLOOD PRESSURE: 66 MMHG | BODY MASS INDEX: 21.71 KG/M2 | OXYGEN SATURATION: 98 % | HEART RATE: 86 BPM

## 2024-03-13 DIAGNOSIS — K21.9 GASTROESOPHAGEAL REFLUX DISEASE, UNSPECIFIED WHETHER ESOPHAGITIS PRESENT: ICD-10-CM

## 2024-03-13 DIAGNOSIS — M25.50 ARTHRALGIA, UNSPECIFIED JOINT: ICD-10-CM

## 2024-03-13 DIAGNOSIS — Z74.09 OTHER REDUCED MOBILITY: ICD-10-CM

## 2024-03-13 DIAGNOSIS — F32.5 MAJOR DEPRESSIVE DISORDER WITH SINGLE EPISODE, IN REMISSION: ICD-10-CM

## 2024-03-13 DIAGNOSIS — Z79.899 HIGH RISK MEDICATION USE: ICD-10-CM

## 2024-03-13 DIAGNOSIS — M50.30 DDD (DEGENERATIVE DISC DISEASE), CERVICAL: ICD-10-CM

## 2024-03-13 DIAGNOSIS — Z00.00 PHYSICAL EXAM: ICD-10-CM

## 2024-03-13 DIAGNOSIS — E78.5 DYSLIPIDEMIA: Primary | ICD-10-CM

## 2024-03-13 PROCEDURE — 99214 OFFICE O/P EST MOD 30 MIN: CPT | Mod: S$GLB,,, | Performed by: NURSE PRACTITIONER

## 2024-03-13 PROCEDURE — 1101F PT FALLS ASSESS-DOCD LE1/YR: CPT | Mod: CPTII,S$GLB,, | Performed by: NURSE PRACTITIONER

## 2024-03-13 PROCEDURE — 3288F FALL RISK ASSESSMENT DOCD: CPT | Mod: CPTII,S$GLB,, | Performed by: NURSE PRACTITIONER

## 2024-03-13 PROCEDURE — 3074F SYST BP LT 130 MM HG: CPT | Mod: CPTII,S$GLB,, | Performed by: NURSE PRACTITIONER

## 2024-03-13 PROCEDURE — 1125F AMNT PAIN NOTED PAIN PRSNT: CPT | Mod: CPTII,S$GLB,, | Performed by: NURSE PRACTITIONER

## 2024-03-13 PROCEDURE — 3008F BODY MASS INDEX DOCD: CPT | Mod: CPTII,S$GLB,, | Performed by: NURSE PRACTITIONER

## 2024-03-13 PROCEDURE — 3078F DIAST BP <80 MM HG: CPT | Mod: CPTII,S$GLB,, | Performed by: NURSE PRACTITIONER

## 2024-03-13 PROCEDURE — 1159F MED LIST DOCD IN RCRD: CPT | Mod: CPTII,S$GLB,, | Performed by: NURSE PRACTITIONER

## 2024-03-13 RX ORDER — METHYLPREDNISOLONE 4 MG/1
TABLET ORAL
Qty: 21 EACH | Refills: 0 | Status: SHIPPED | OUTPATIENT
Start: 2024-03-13 | End: 2024-04-03

## 2024-03-17 ENCOUNTER — PATIENT MESSAGE (OUTPATIENT)
Dept: FAMILY MEDICINE | Facility: CLINIC | Age: 74
End: 2024-03-17
Payer: MEDICARE

## 2024-03-17 NOTE — PROGRESS NOTES
SUBJECTIVE:    Patient ID: Sylvia Maxwell is a 73 y.o. female.    Chief Complaint: Follow-up (No bottles//Pt here for 3 mo follow up//JL)    73y.o. female who presents today for check up. She is treated regularly for depression, dyslipidemia  and gerd.  She is complaining of back pain. Seems like back  pain is not much better.received juan from dr avila. Did not get much relief. Hard time sleeping . Can not get comfortable. Has hand soreness in am. Still has sinus congestion. Stress is manageable.=    Follow-up  Associated symptoms include arthralgias. Pertinent negatives include no chest pain, headaches or vomiting.       Office Visit on 03/13/2024   Component Date Value Ref Range Status    WBC 03/15/2024 7.6  3.8 - 10.8 Thousand/uL Final    RBC 03/15/2024 4.19  3.80 - 5.10 Million/uL Final    Hemoglobin 03/15/2024 13.2  11.7 - 15.5 g/dL Final    Hematocrit 03/15/2024 40.4  35.0 - 45.0 % Final    MCV 03/15/2024 96.4  80.0 - 100.0 fL Final    MCH 03/15/2024 31.5  27.0 - 33.0 pg Final    MCHC 03/15/2024 32.7  32.0 - 36.0 g/dL Final    RDW 03/15/2024 12.0  11.0 - 15.0 % Final    Platelets 03/15/2024 190  140 - 400 Thousand/uL Final    MPV 03/15/2024 11.8  7.5 - 12.5 fL Final    Neutrophils, Abs 03/15/2024 6,126  1,500 - 7,800 cells/uL Final    Lymph # 03/15/2024 935  850 - 3,900 cells/uL Final    Mono # 03/15/2024 479  200 - 950 cells/uL Final    Eos # 03/15/2024 8 (L)  15 - 500 cells/uL Final    Baso # 03/15/2024 53  0 - 200 cells/uL Final    Neutrophils Relative 03/15/2024 80.6  % Final    Lymph % 03/15/2024 12.3  % Final    Mono % 03/15/2024 6.3  % Final    Eosinophil % 03/15/2024 0.1  % Final    Basophil % 03/15/2024 0.7  % Final    Glucose 03/15/2024 108 (H)  65 - 99 mg/dL Final    BUN 03/15/2024 19  7 - 25 mg/dL Final    Creatinine 03/15/2024 0.49 (L)  0.60 - 1.00 mg/dL Final    eGFR 03/15/2024 100  > OR = 60 mL/min/1.73m2 Final    BUN/Creatinine Ratio 03/15/2024 39 (H)  6 - 22 (calc) Final    Sodium 03/15/2024  136  135 - 146 mmol/L Final    Potassium 03/15/2024 3.9  3.5 - 5.3 mmol/L Final    Chloride 03/15/2024 99  98 - 110 mmol/L Final    CO2 03/15/2024 30  20 - 32 mmol/L Final    Calcium 03/15/2024 9.9  8.6 - 10.4 mg/dL Final    Total Protein 03/15/2024 7.1  6.1 - 8.1 g/dL Final    Albumin 03/15/2024 4.5  3.6 - 5.1 g/dL Final    Globulin, Total 03/15/2024 2.6  1.9 - 3.7 g/dL (calc) Final    Albumin/Globulin Ratio 03/15/2024 1.7  1.0 - 2.5 (calc) Final    Total Bilirubin 03/15/2024 0.9  0.2 - 1.2 mg/dL Final    Alkaline Phosphatase 03/15/2024 64  37 - 153 U/L Final    AST 03/15/2024 18  10 - 35 U/L Final    ALT 03/15/2024 18  6 - 29 U/L Final    Cholesterol 03/15/2024 209 (H)  <200 mg/dL Final    HDL 03/15/2024 90  > OR = 50 mg/dL Final    Triglycerides 03/15/2024 64  <150 mg/dL Final    LDL Cholesterol 03/15/2024 104 (H)  mg/dL (calc) Final    HDL/Cholesterol Ratio 03/15/2024 2.3  <5.0 (calc) Final    Non HDL Chol. (LDL+VLDL) 03/15/2024 119  <130 mg/dL (calc) Final    TSH w/reflex to FT4 03/15/2024 0.93  0.40 - 4.50 mIU/L Final    Creatinine, Urine 03/15/2024 156  20 - 275 mg/dL Final    Microalb, Ur 03/15/2024 2.1  See Note: mg/dL Final    Microalb/Creat Ratio 03/15/2024 13  <30 mcg/mg creat Final    Color, UA 03/15/2024 YELLOW  YELLOW Final    Appearance, UA 03/15/2024 CLEAR  CLEAR Final    Specific Gravity, UA 03/15/2024 1.023  1.001 - 1.035 Final    pH, UA 03/15/2024 5.5  5.0 - 8.0 Final    Glucose, UA 03/15/2024 NEGATIVE  NEGATIVE Final    Bilirubin, UA 03/15/2024 NEGATIVE  NEGATIVE Final    Ketones, UA 03/15/2024 TRACE (A)  NEGATIVE Final    Occult Blood UA 03/15/2024 NEGATIVE  NEGATIVE Final    Protein, UA 03/15/2024 NEGATIVE  NEGATIVE Final    Nitrite, UA 03/15/2024 NEGATIVE  NEGATIVE Final    Leukocytes, UA 03/15/2024 NEGATIVE  NEGATIVE Final    WBC Casts, UA 03/15/2024 0-5  < OR = 5 /HPF Final    RBC Casts, UA 03/15/2024 NONE SEEN  < OR = 2 /HPF Final    Squam Epithel, UA 03/15/2024 NONE SEEN  < OR = 5 /HPF  Final    Bacteria, UA 03/15/2024 FEW (A)  NONE SEEN /HPF Final    Ca Oxalate [place], UA 03/15/2024 MODERATE (A)  NONE OR FEW /HPF Final    Hyaline Casts, UA 03/15/2024 0-5 (A)  NONE SEEN /LPF Final    Service Cmt: 03/15/2024    Final    Reflexive Urine Culture 03/15/2024    Final       History reviewed. No pertinent past medical history.  Social History     Socioeconomic History    Marital status:    Tobacco Use    Smoking status: Former    Smokeless tobacco: Never   Substance and Sexual Activity    Alcohol use: Yes     Comment: ocassionally    Drug use: Never    Sexual activity: Not Currently     Social Determinants of Health     Financial Resource Strain: Patient Declined (11/25/2023)    Overall Financial Resource Strain (CARDIA)     Difficulty of Paying Living Expenses: Patient declined   Food Insecurity: Patient Declined (11/25/2023)    Hunger Vital Sign     Worried About Running Out of Food in the Last Year: Patient declined     Ran Out of Food in the Last Year: Patient declined   Transportation Needs: Patient Declined (11/25/2023)    PRAPARE - Transportation     Lack of Transportation (Medical): Patient declined     Lack of Transportation (Non-Medical): Patient declined   Physical Activity: Unknown (11/25/2023)    Exercise Vital Sign     Days of Exercise per Week: Patient declined   Stress: Patient Declined (11/25/2023)    [employer] - Occupational Stress Questionnaire     Feeling of Stress : Patient declined   Social Connections: Unknown (11/25/2023)    Social Connection and Isolation Panel [NHANES]     Frequency of Communication with Friends and Family: Patient declined     Frequency of Social Gatherings with Friends and Family: Patient declined     Active Member of Clubs or Organizations: Patient declined     Attends Club or Organization Meetings: Patient declined     Marital Status: Patient declined   Housing Stability: Unknown (11/25/2023)    Housing Stability Vital Sign      Unable to Pay for Housing in the Last Year: Patient refused     Unstable Housing in the Last Year: Patient refused     Past Surgical History:   Procedure Laterality Date    BREAST CYST ASPIRATION Left     CATARACT EXTRACTION Bilateral     Both done in 1/2024    left wrist      arthritis    TUBAL LIGATION       Family History   Problem Relation Age of Onset    Eczema Neg Hx     Lupus Neg Hx     Psoriasis Neg Hx     Melanoma Neg Hx        All of your core healthy metrics are met.      Review of patient's allergies indicates:  No Known Allergies    Current Outpatient Medications:     ascorbate calcium, vitamin C, 500 mg Tab, Vitamin C 500 mg tablet  Take 1 tablet every day by oral route., Disp: , Rfl:     calcium-vitamin D3 (OS-FERNANDA 500 + D3) 500 mg-5 mcg (200 unit) per tablet, Take 1 tablet by mouth 2 (two) times daily with meals., Disp: , Rfl:     famotidine (PEPCID) 40 MG tablet, Take 1 tablet (40 mg total) by mouth once daily., Disp: 30 tablet, Rfl: 11    magnesium 200 mg Tab, magnesium, Disp: , Rfl:     multivit-min-iron-FA-K.gin 18 mg iron-400 mcg-50 mg Tab, Take 1 tablet by mouth once daily. , Disp: , Rfl:     pantoprazole (PROTONIX) 40 MG tablet, Take 1 tablet (40 mg total) by mouth once daily., Disp: 90 tablet, Rfl: 1    rosuvastatin (CRESTOR) 5 MG tablet, Take 1 tablet (5 mg total) by mouth once daily., Disp: 90 tablet, Rfl: 1    sertraline (ZOLOFT) 50 MG tablet, Take 1 tablet (50 mg total) by mouth once daily., Disp: 90 tablet, Rfl: 0    methylPREDNISolone (MEDROL DOSEPACK) 4 mg tablet, use as directed, Disp: 21 each, Rfl: 0    Review of Systems   Constitutional:  Positive for activity change.   HENT:  Positive for hearing loss. Negative for trouble swallowing.    Eyes:  Negative for discharge.   Respiratory:  Negative for chest tightness and wheezing.    Cardiovascular:  Negative for chest pain and palpitations.   Gastrointestinal:  Negative for constipation, diarrhea and vomiting.   Genitourinary:   "Negative for difficulty urinating and hematuria.   Musculoskeletal:  Positive for arthralgias.   Neurological:  Negative for headaches.   Psychiatric/Behavioral:  Negative for dysphoric mood.           Objective:      Vitals:    03/13/24 1347   BP: 92/66   Pulse: 86   SpO2: 98%   Weight: 53.5 kg (118 lb)   Height: 5' 1.5" (1.562 m)     Physical Exam  Vitals and nursing note reviewed.   Constitutional:       General: She is not in acute distress.     Appearance: Normal appearance. She is well-developed.   HENT:      Head: Normocephalic.      Right Ear: External ear normal.      Left Ear: External ear normal.   Eyes:      Conjunctiva/sclera: Conjunctivae normal.      Pupils: Pupils are equal, round, and reactive to light.   Neck:      Vascular: No JVD.   Cardiovascular:      Rate and Rhythm: Normal rate and regular rhythm.      Heart sounds: No murmur heard.  Pulmonary:      Effort: Pulmonary effort is normal.      Breath sounds: Normal breath sounds.   Abdominal:      General: Bowel sounds are normal.      Palpations: Abdomen is soft.   Musculoskeletal:         General: No deformity.      Cervical back: Neck supple. Decreased range of motion.      Lumbar back: Decreased range of motion.   Lymphadenopathy:      Cervical: No cervical adenopathy.   Skin:     General: Skin is warm and dry.      Findings: No rash.   Neurological:      Mental Status: She is alert and oriented to person, place, and time.      Gait: Gait normal.   Psychiatric:         Speech: Speech normal.         Behavior: Behavior normal.           Assessment:       1. Dyslipidemia    2. Major depressive disorder with single episode, in remission    3. Physical exam    4. High risk medication use    5. Arthralgia, unspecified joint    6. Other reduced mobility    7. DDD (degenerative disc disease), cervical    8. Gastroesophageal reflux disease, unspecified whether esophagitis present         Plan:       Dyslipidemia  Comments:  ldl is well " controlled  Orders:  -     CBC Auto Differential; Future; Expected date: 03/13/2024  -     Comprehensive Metabolic Panel; Future; Expected date: 03/13/2024  -     Lipid Panel; Future; Expected date: 03/13/2024  -     TSH w/reflex to FT4; Future; Expected date: 03/13/2024  -     Microalbumin/Creatinine Ratio, Urine; Future; Expected date: 03/13/2024  -     Urinalysis, Reflex to Urine Culture Urine, Clean Catch; Future; Expected date: 03/13/2024    Major depressive disorder with single episode, in remission  Comments:  zoloft  Orders:  -     CBC Auto Differential; Future; Expected date: 03/13/2024  -     Comprehensive Metabolic Panel; Future; Expected date: 03/13/2024  -     Lipid Panel; Future; Expected date: 03/13/2024  -     TSH w/reflex to FT4; Future; Expected date: 03/13/2024  -     Microalbumin/Creatinine Ratio, Urine; Future; Expected date: 03/13/2024  -     Urinalysis, Reflex to Urine Culture Urine, Clean Catch; Future; Expected date: 03/13/2024    Physical exam  -     CBC Auto Differential; Future; Expected date: 03/13/2024  -     Comprehensive Metabolic Panel; Future; Expected date: 03/13/2024  -     Lipid Panel; Future; Expected date: 03/13/2024  -     TSH w/reflex to FT4; Future; Expected date: 03/13/2024  -     Microalbumin/Creatinine Ratio, Urine; Future; Expected date: 03/13/2024  -     Urinalysis, Reflex to Urine Culture Urine, Clean Catch; Future; Expected date: 03/13/2024    High risk medication use  -     CBC Auto Differential; Future; Expected date: 03/13/2024  -     Comprehensive Metabolic Panel; Future; Expected date: 03/13/2024  -     Lipid Panel; Future; Expected date: 03/13/2024  -     TSH w/reflex to FT4; Future; Expected date: 03/13/2024  -     Microalbumin/Creatinine Ratio, Urine; Future; Expected date: 03/13/2024  -     Urinalysis, Reflex to Urine Culture Urine, Clean Catch; Future; Expected date: 03/13/2024    Arthralgia, unspecified joint  Comments:  labs  Orders:  -     HIGH  SENSITIVITY CRP; Future; Expected date: 03/13/2024  -     ANSLEY Screen w/Reflex; Future; Expected date: 03/13/2024  -     Rheumatoid Factor; Future; Expected date: 03/13/2024    Other reduced mobility  -     HIGH SENSITIVITY CRP; Future; Expected date: 03/13/2024    DDD (degenerative disc disease), cervical  Comments:  dr. avila    Gastroesophageal reflux disease, unspecified whether esophagitis present  Comments:  managed with diet and meds    Other orders  -     methylPREDNISolone (MEDROL DOSEPACK) 4 mg tablet; use as directed  Dispense: 21 each; Refill: 0  -     ANSLEY Screen w/Reflex  -     CRP, High Sensitivity  -     Rheumatoid Factor      Follow up in about 3 months (around 6/13/2024), or if symptoms worsen or fail to improve, for medication management.        3/17/2024 Bonnie Arita

## 2024-03-18 ENCOUNTER — PATIENT MESSAGE (OUTPATIENT)
Dept: FAMILY MEDICINE | Facility: CLINIC | Age: 74
End: 2024-03-18
Payer: MEDICARE

## 2024-03-18 DIAGNOSIS — R76.8 POSITIVE ANA (ANTINUCLEAR ANTIBODY): Primary | ICD-10-CM

## 2024-03-19 LAB
ALBUMIN SERPL-MCNC: 4.5 G/DL (ref 3.6–5.1)
ALBUMIN/CREAT UR: 13 MCG/MG CREAT
ALBUMIN/GLOB SERPL: 1.7 (CALC) (ref 1–2.5)
ALP SERPL-CCNC: 64 U/L (ref 37–153)
ALT SERPL-CCNC: 18 U/L (ref 6–29)
ANA PAT SER IF-IMP: ABNORMAL
ANA PAT SER IF-IMP: ABNORMAL
ANA SER QL IF: POSITIVE
ANA TITR SER IF: ABNORMAL TITER
ANA TITR SER IF: ABNORMAL TITER
APPEARANCE UR: CLEAR
AST SERPL-CCNC: 18 U/L (ref 10–35)
BACTERIA #/AREA URNS HPF: ABNORMAL /HPF
BACTERIA UR CULT: ABNORMAL
BASOPHILS # BLD AUTO: 53 CELLS/UL (ref 0–200)
BASOPHILS NFR BLD AUTO: 0.7 %
BILIRUB SERPL-MCNC: 0.9 MG/DL (ref 0.2–1.2)
BILIRUB UR QL STRIP: NEGATIVE
BUN SERPL-MCNC: 19 MG/DL (ref 7–25)
BUN/CREAT SERPL: 39 (CALC) (ref 6–22)
CALCIUM SERPL-MCNC: 9.9 MG/DL (ref 8.6–10.4)
CAOX CRY #/AREA URNS HPF: ABNORMAL /HPF
CHLORIDE SERPL-SCNC: 99 MMOL/L (ref 98–110)
CHOLEST SERPL-MCNC: 209 MG/DL
CHOLEST/HDLC SERPL: 2.3 (CALC)
CO2 SERPL-SCNC: 30 MMOL/L (ref 20–32)
COLOR UR: YELLOW
CREAT SERPL-MCNC: 0.49 MG/DL (ref 0.6–1)
CREAT UR-MCNC: 156 MG/DL (ref 20–275)
CRP SERPL HS-MCNC: 0.6 MG/L
EGFR: 100 ML/MIN/1.73M2
EOSINOPHIL # BLD AUTO: 8 CELLS/UL (ref 15–500)
EOSINOPHIL NFR BLD AUTO: 0.1 %
ERYTHROCYTE [DISTWIDTH] IN BLOOD BY AUTOMATED COUNT: 12 % (ref 11–15)
GLOBULIN SER CALC-MCNC: 2.6 G/DL (CALC) (ref 1.9–3.7)
GLUCOSE SERPL-MCNC: 108 MG/DL (ref 65–99)
GLUCOSE UR QL STRIP: NEGATIVE
HCT VFR BLD AUTO: 40.4 % (ref 35–45)
HDLC SERPL-MCNC: 90 MG/DL
HGB BLD-MCNC: 13.2 G/DL (ref 11.7–15.5)
HGB UR QL STRIP: NEGATIVE
HYALINE CASTS #/AREA URNS LPF: ABNORMAL /LPF
KETONES UR QL STRIP: ABNORMAL
LDLC SERPL CALC-MCNC: 104 MG/DL (CALC)
LEUKOCYTE ESTERASE UR QL STRIP: NEGATIVE
LYMPHOCYTES # BLD AUTO: 935 CELLS/UL (ref 850–3900)
LYMPHOCYTES NFR BLD AUTO: 12.3 %
MCH RBC QN AUTO: 31.5 PG (ref 27–33)
MCHC RBC AUTO-ENTMCNC: 32.7 G/DL (ref 32–36)
MCV RBC AUTO: 96.4 FL (ref 80–100)
MICROALBUMIN UR-MCNC: 2.1 MG/DL
MONOCYTES # BLD AUTO: 479 CELLS/UL (ref 200–950)
MONOCYTES NFR BLD AUTO: 6.3 %
NEUTROPHILS # BLD AUTO: 6126 CELLS/UL (ref 1500–7800)
NEUTROPHILS NFR BLD AUTO: 80.6 %
NITRITE UR QL STRIP: NEGATIVE
NONHDLC SERPL-MCNC: 119 MG/DL (CALC)
PH UR STRIP: 5.5 [PH] (ref 5–8)
PLATELET # BLD AUTO: 190 THOUSAND/UL (ref 140–400)
PMV BLD REES-ECKER: 11.8 FL (ref 7.5–12.5)
POTASSIUM SERPL-SCNC: 3.9 MMOL/L (ref 3.5–5.3)
PROT SERPL-MCNC: 7.1 G/DL (ref 6.1–8.1)
PROT UR QL STRIP: NEGATIVE
RBC # BLD AUTO: 4.19 MILLION/UL (ref 3.8–5.1)
RBC #/AREA URNS HPF: ABNORMAL /HPF
RHEUMATOID FACT SERPL-ACNC: <14 IU/ML
SERVICE CMNT-IMP: ABNORMAL
SODIUM SERPL-SCNC: 136 MMOL/L (ref 135–146)
SP GR UR STRIP: 1.02 (ref 1–1.03)
SQUAMOUS #/AREA URNS HPF: ABNORMAL /HPF
TRIGL SERPL-MCNC: 64 MG/DL
TSH SERPL-ACNC: 0.93 MIU/L (ref 0.4–4.5)
WBC # BLD AUTO: 7.6 THOUSAND/UL (ref 3.8–10.8)
WBC #/AREA URNS HPF: ABNORMAL /HPF

## 2024-04-24 ENCOUNTER — PATIENT MESSAGE (OUTPATIENT)
Dept: FAMILY MEDICINE | Facility: CLINIC | Age: 74
End: 2024-04-24
Payer: MEDICARE

## 2024-04-24 DIAGNOSIS — K21.9 GASTROESOPHAGEAL REFLUX DISEASE, UNSPECIFIED WHETHER ESOPHAGITIS PRESENT: ICD-10-CM

## 2024-04-24 RX ORDER — PANTOPRAZOLE SODIUM 40 MG/1
40 TABLET, DELAYED RELEASE ORAL DAILY
Qty: 90 TABLET | Refills: 1 | Status: SHIPPED | OUTPATIENT
Start: 2024-04-24

## 2024-05-26 ENCOUNTER — PATIENT MESSAGE (OUTPATIENT)
Dept: FAMILY MEDICINE | Facility: CLINIC | Age: 74
End: 2024-05-26
Payer: MEDICARE

## 2024-05-26 DIAGNOSIS — F32.5 MAJOR DEPRESSIVE DISORDER WITH SINGLE EPISODE, IN REMISSION: ICD-10-CM

## 2024-05-27 RX ORDER — SERTRALINE HYDROCHLORIDE 50 MG/1
50 TABLET, FILM COATED ORAL DAILY
Qty: 90 TABLET | Refills: 0 | Status: SHIPPED | OUTPATIENT
Start: 2024-05-27 | End: 2024-08-25

## 2024-06-15 ENCOUNTER — PATIENT MESSAGE (OUTPATIENT)
Dept: FAMILY MEDICINE | Facility: CLINIC | Age: 74
End: 2024-06-15
Payer: MEDICARE

## 2024-06-15 DIAGNOSIS — E78.5 HYPERLIPIDEMIA, UNSPECIFIED HYPERLIPIDEMIA TYPE: ICD-10-CM

## 2024-06-17 RX ORDER — ROSUVASTATIN CALCIUM 5 MG/1
5 TABLET, COATED ORAL DAILY
Qty: 90 TABLET | Refills: 1 | Status: SHIPPED | OUTPATIENT
Start: 2024-06-17 | End: 2024-06-20 | Stop reason: SDUPTHER

## 2024-06-17 RX ORDER — FAMOTIDINE 40 MG/1
40 TABLET, FILM COATED ORAL DAILY
Qty: 30 TABLET | Refills: 11 | Status: SHIPPED | OUTPATIENT
Start: 2024-06-17 | End: 2024-06-20 | Stop reason: SDUPTHER

## 2024-06-18 ENCOUNTER — PATIENT MESSAGE (OUTPATIENT)
Dept: FAMILY MEDICINE | Facility: CLINIC | Age: 74
End: 2024-06-18
Payer: MEDICARE

## 2024-06-18 DIAGNOSIS — E78.5 HYPERLIPIDEMIA, UNSPECIFIED HYPERLIPIDEMIA TYPE: ICD-10-CM

## 2024-06-20 RX ORDER — FAMOTIDINE 40 MG/1
40 TABLET, FILM COATED ORAL DAILY
Qty: 30 TABLET | Refills: 11 | Status: SHIPPED | OUTPATIENT
Start: 2024-06-20 | End: 2025-06-20

## 2024-06-20 RX ORDER — ROSUVASTATIN CALCIUM 5 MG/1
5 TABLET, COATED ORAL DAILY
Qty: 90 TABLET | Refills: 1 | Status: SHIPPED | OUTPATIENT
Start: 2024-06-20 | End: 2025-06-20

## 2024-06-25 DIAGNOSIS — M54.31 RIGHT SIDED SCIATICA: ICD-10-CM

## 2024-06-25 DIAGNOSIS — M54.50 LOW BACK PAIN: Primary | ICD-10-CM

## 2024-07-12 ENCOUNTER — HOSPITAL ENCOUNTER (OUTPATIENT)
Dept: RADIOLOGY | Facility: HOSPITAL | Age: 74
Discharge: HOME | End: 2024-07-12
Attending: ORTHOPAEDIC SURGERY
Payer: MEDICARE

## 2024-07-12 DIAGNOSIS — M54.31 RIGHT SIDED SCIATICA: ICD-10-CM

## 2024-07-12 DIAGNOSIS — M54.50 LOW BACK PAIN: ICD-10-CM

## 2024-07-12 PROCEDURE — 72148 MRI LUMBAR SPINE W/O DYE: CPT | Mod: 26,,, | Performed by: RADIOLOGY

## 2024-07-12 PROCEDURE — 72148 MRI LUMBAR SPINE W/O DYE: CPT | Mod: TC,PO

## 2024-08-12 ENCOUNTER — PATIENT MESSAGE (OUTPATIENT)
Dept: FAMILY MEDICINE | Facility: CLINIC | Age: 74
End: 2024-08-12
Payer: MEDICARE

## 2024-08-12 RX ORDER — MUPIROCIN 20 MG/G
OINTMENT TOPICAL 3 TIMES DAILY
Qty: 22 G | Refills: 0 | Status: SHIPPED | OUTPATIENT
Start: 2024-08-12

## 2024-08-19 ENCOUNTER — PATIENT MESSAGE (OUTPATIENT)
Dept: FAMILY MEDICINE | Facility: CLINIC | Age: 74
End: 2024-08-19

## 2024-08-19 ENCOUNTER — OFFICE VISIT (OUTPATIENT)
Dept: FAMILY MEDICINE | Facility: CLINIC | Age: 74
End: 2024-08-19
Payer: MEDICARE

## 2024-08-19 VITALS
BODY MASS INDEX: 22.68 KG/M2 | DIASTOLIC BLOOD PRESSURE: 62 MMHG | SYSTOLIC BLOOD PRESSURE: 106 MMHG | HEART RATE: 76 BPM | WEIGHT: 120.13 LBS | HEIGHT: 61 IN | OXYGEN SATURATION: 96 %

## 2024-08-19 DIAGNOSIS — F32.5 MAJOR DEPRESSIVE DISORDER WITH SINGLE EPISODE, IN REMISSION: ICD-10-CM

## 2024-08-19 DIAGNOSIS — Z79.899 HIGH RISK MEDICATION USE: Primary | ICD-10-CM

## 2024-08-19 DIAGNOSIS — E78.5 DYSLIPIDEMIA: ICD-10-CM

## 2024-08-19 DIAGNOSIS — D64.9 ANEMIA, UNSPECIFIED TYPE: ICD-10-CM

## 2024-08-19 DIAGNOSIS — K21.9 GASTROESOPHAGEAL REFLUX DISEASE, UNSPECIFIED WHETHER ESOPHAGITIS PRESENT: ICD-10-CM

## 2024-08-19 DIAGNOSIS — M41.80 LEVOSCOLIOSIS: ICD-10-CM

## 2024-08-19 DIAGNOSIS — R73.01 ELEVATED FASTING BLOOD SUGAR: Primary | ICD-10-CM

## 2024-08-19 PROCEDURE — 1101F PT FALLS ASSESS-DOCD LE1/YR: CPT | Mod: CPTII,S$GLB,, | Performed by: NURSE PRACTITIONER

## 2024-08-19 PROCEDURE — 3044F HG A1C LEVEL LT 7.0%: CPT | Mod: CPTII,S$GLB,, | Performed by: NURSE PRACTITIONER

## 2024-08-19 PROCEDURE — 1160F RVW MEDS BY RX/DR IN RCRD: CPT | Mod: CPTII,S$GLB,, | Performed by: NURSE PRACTITIONER

## 2024-08-19 PROCEDURE — 3008F BODY MASS INDEX DOCD: CPT | Mod: CPTII,S$GLB,, | Performed by: NURSE PRACTITIONER

## 2024-08-19 PROCEDURE — 1159F MED LIST DOCD IN RCRD: CPT | Mod: CPTII,S$GLB,, | Performed by: NURSE PRACTITIONER

## 2024-08-19 PROCEDURE — 1125F AMNT PAIN NOTED PAIN PRSNT: CPT | Mod: CPTII,S$GLB,, | Performed by: NURSE PRACTITIONER

## 2024-08-19 PROCEDURE — 3288F FALL RISK ASSESSMENT DOCD: CPT | Mod: CPTII,S$GLB,, | Performed by: NURSE PRACTITIONER

## 2024-08-19 PROCEDURE — 3078F DIAST BP <80 MM HG: CPT | Mod: CPTII,S$GLB,, | Performed by: NURSE PRACTITIONER

## 2024-08-19 PROCEDURE — 3074F SYST BP LT 130 MM HG: CPT | Mod: CPTII,S$GLB,, | Performed by: NURSE PRACTITIONER

## 2024-08-19 PROCEDURE — 99214 OFFICE O/P EST MOD 30 MIN: CPT | Mod: S$GLB,,, | Performed by: NURSE PRACTITIONER

## 2024-08-19 RX ORDER — PANTOPRAZOLE SODIUM 40 MG/1
40 TABLET, DELAYED RELEASE ORAL DAILY
Qty: 90 TABLET | Refills: 1 | Status: SHIPPED | OUTPATIENT
Start: 2024-08-19

## 2024-08-19 RX ORDER — SERTRALINE HYDROCHLORIDE 50 MG/1
50 TABLET, FILM COATED ORAL DAILY
Qty: 90 TABLET | Refills: 0 | Status: SHIPPED | OUTPATIENT
Start: 2024-08-19 | End: 2024-11-17

## 2024-08-19 NOTE — PROGRESS NOTES
SUBJECTIVE:    Patient ID: Sylvia Maxwell is a 73 y.o. female.    Chief Complaint: Follow-up (No Bottles//refills needed//dexa go through GYN//colon will go through Dr. Lewis//mammo will go through GYN//-ERL)    73y.o. female who presents today for check up. She is treated regularly for depression, dyslipidemia  and gerd.  She is complaining of back pain. Seems like back  pain is not much better.received juan from dr avila. Did not get much relief. Started pt . However did not complete. Had significant knee pain after  so did not continue. Frustrated. Not able to get around that well. Does not sleep well. Due for labs.    Follow-up  Associated symptoms include arthralgias and joint swelling. Pertinent negatives include no chest pain, headaches, neck pain, vomiting or weakness.       Office Visit on 08/19/2024   Component Date Value Ref Range Status    Glucose 08/21/2024 97  65 - 99 mg/dL Final    BUN 08/21/2024 20  7 - 25 mg/dL Final    Creatinine 08/21/2024 0.63  0.60 - 1.00 mg/dL Final    eGFR 08/21/2024 94  > OR = 60 mL/min/1.73m2 Final    BUN/Creatinine Ratio 08/21/2024 SEE NOTE:  6 - 22 (calc) Final    Sodium 08/21/2024 137  135 - 146 mmol/L Final    Potassium 08/21/2024 4.1  3.5 - 5.3 mmol/L Final    Chloride 08/21/2024 100  98 - 110 mmol/L Final    CO2 08/21/2024 27  20 - 32 mmol/L Final    Calcium 08/21/2024 9.7  8.6 - 10.4 mg/dL Final    Total Protein 08/21/2024 7.1  6.1 - 8.1 g/dL Final    Albumin 08/21/2024 4.6  3.6 - 5.1 g/dL Final    Globulin, Total 08/21/2024 2.5  1.9 - 3.7 g/dL (calc) Final    Albumin/Globulin Ratio 08/21/2024 1.8  1.0 - 2.5 (calc) Final    Total Bilirubin 08/21/2024 1.0  0.2 - 1.2 mg/dL Final    Alkaline Phosphatase 08/21/2024 72  37 - 153 U/L Final    AST 08/21/2024 20  10 - 35 U/L Final    ALT 08/21/2024 16  6 - 29 U/L Final    Cholesterol 08/21/2024 194  <200 mg/dL Final    HDL 08/21/2024 74  > OR = 50 mg/dL Final    Triglycerides 08/21/2024 94  <150 mg/dL Final    LDL  Cholesterol 08/21/2024 101 (H)  mg/dL (calc) Final    HDL/Cholesterol Ratio 08/21/2024 2.6  <5.0 (calc) Final    Non HDL Chol. (LDL+VLDL) 08/21/2024 120  <130 mg/dL (calc) Final    TSH w/reflex to FT4 08/21/2024 1.92  0.40 - 4.50 mIU/L Final    Ferritin 08/21/2024 86  16 - 288 ng/mL Final    Iron 08/21/2024 132  45 - 160 mcg/dL Final    TIBC 08/21/2024 389  250 - 450 mcg/dL (calc) Final    Iron Saturation 08/21/2024 34  16 - 45 % (calc) Final    Hemoglobin A1C 08/21/2024 5.5  <5.7 % of total Hgb Final   Office Visit on 03/13/2024   Component Date Value Ref Range Status    WBC 03/15/2024 7.6  3.8 - 10.8 Thousand/uL Final    RBC 03/15/2024 4.19  3.80 - 5.10 Million/uL Final    Hemoglobin 03/15/2024 13.2  11.7 - 15.5 g/dL Final    Hematocrit 03/15/2024 40.4  35.0 - 45.0 % Final    MCV 03/15/2024 96.4  80.0 - 100.0 fL Final    MCH 03/15/2024 31.5  27.0 - 33.0 pg Final    MCHC 03/15/2024 32.7  32.0 - 36.0 g/dL Final    RDW 03/15/2024 12.0  11.0 - 15.0 % Final    Platelets 03/15/2024 190  140 - 400 Thousand/uL Final    MPV 03/15/2024 11.8  7.5 - 12.5 fL Final    Neutrophils, Abs 03/15/2024 6,126  1,500 - 7,800 cells/uL Final    Lymph # 03/15/2024 935  850 - 3,900 cells/uL Final    Mono # 03/15/2024 479  200 - 950 cells/uL Final    Eos # 03/15/2024 8 (L)  15 - 500 cells/uL Final    Baso # 03/15/2024 53  0 - 200 cells/uL Final    Neutrophils Relative 03/15/2024 80.6  % Final    Lymph % 03/15/2024 12.3  % Final    Mono % 03/15/2024 6.3  % Final    Eosinophil % 03/15/2024 0.1  % Final    Basophil % 03/15/2024 0.7  % Final    Glucose 03/15/2024 108 (H)  65 - 99 mg/dL Final    BUN 03/15/2024 19  7 - 25 mg/dL Final    Creatinine 03/15/2024 0.49 (L)  0.60 - 1.00 mg/dL Final    eGFR 03/15/2024 100  > OR = 60 mL/min/1.73m2 Final    BUN/Creatinine Ratio 03/15/2024 39 (H)  6 - 22 (calc) Final    Sodium 03/15/2024 136  135 - 146 mmol/L Final    Potassium 03/15/2024 3.9  3.5 - 5.3 mmol/L Final    Chloride 03/15/2024 99  98 - 110 mmol/L  Final    CO2 03/15/2024 30  20 - 32 mmol/L Final    Calcium 03/15/2024 9.9  8.6 - 10.4 mg/dL Final    Total Protein 03/15/2024 7.1  6.1 - 8.1 g/dL Final    Albumin 03/15/2024 4.5  3.6 - 5.1 g/dL Final    Globulin, Total 03/15/2024 2.6  1.9 - 3.7 g/dL (calc) Final    Albumin/Globulin Ratio 03/15/2024 1.7  1.0 - 2.5 (calc) Final    Total Bilirubin 03/15/2024 0.9  0.2 - 1.2 mg/dL Final    Alkaline Phosphatase 03/15/2024 64  37 - 153 U/L Final    AST 03/15/2024 18  10 - 35 U/L Final    ALT 03/15/2024 18  6 - 29 U/L Final    Cholesterol 03/15/2024 209 (H)  <200 mg/dL Final    HDL 03/15/2024 90  > OR = 50 mg/dL Final    Triglycerides 03/15/2024 64  <150 mg/dL Final    LDL Cholesterol 03/15/2024 104 (H)  mg/dL (calc) Final    HDL/Cholesterol Ratio 03/15/2024 2.3  <5.0 (calc) Final    Non HDL Chol. (LDL+VLDL) 03/15/2024 119  <130 mg/dL (calc) Final    TSH w/reflex to FT4 03/15/2024 0.93  0.40 - 4.50 mIU/L Final    Creatinine, Urine 03/15/2024 156  20 - 275 mg/dL Final    Microalb, Ur 03/15/2024 2.1  See Note: mg/dL Final    Microalb/Creat Ratio 03/15/2024 13  <30 mcg/mg creat Final    Color, UA 03/15/2024 YELLOW  YELLOW Final    Appearance, UA 03/15/2024 CLEAR  CLEAR Final    Specific Gravity, UA 03/15/2024 1.023  1.001 - 1.035 Final    pH, UA 03/15/2024 5.5  5.0 - 8.0 Final    Glucose, UA 03/15/2024 NEGATIVE  NEGATIVE Final    Bilirubin, UA 03/15/2024 NEGATIVE  NEGATIVE Final    Ketones, UA 03/15/2024 TRACE (A)  NEGATIVE Final    Occult Blood UA 03/15/2024 NEGATIVE  NEGATIVE Final    Protein, UA 03/15/2024 NEGATIVE  NEGATIVE Final    Nitrite, UA 03/15/2024 NEGATIVE  NEGATIVE Final    Leukocytes, UA 03/15/2024 NEGATIVE  NEGATIVE Final    WBC Casts, UA 03/15/2024 0-5  < OR = 5 /HPF Final    RBC Casts, UA 03/15/2024 NONE SEEN  < OR = 2 /HPF Final    Squam Epithel, UA 03/15/2024 NONE SEEN  < OR = 5 /HPF Final    Bacteria, UA 03/15/2024 FEW (A)  NONE SEEN /HPF Final    Ca Oxalate [place], UA 03/15/2024 MODERATE (A)  NONE OR FEW  /HPF Final    Hyaline Casts, UA 03/15/2024 0-5 (A)  NONE SEEN /LPF Final    Service Cmt: 03/15/2024    Final    Reflexive Urine Culture 03/15/2024    Final    ANSLEY 03/15/2024 POSITIVE (A)  NEGATIVE Final    ANSLEY Titer 03/15/2024 1:40 (H)  titer Final    ANSLEY Pattern 03/15/2024 Nuclear, Homogeneous (A)   Final    ANSLEY Titer 03/15/2024 1:40 (H)  titer Final    ANSLEY Pattern 03/15/2024 Cytoplasmic (A)   Final    CRP, High Sensitivity 03/15/2024 0.6  mg/L  Final    Rheumatoid Factor 03/15/2024 <14  <14 IU/mL Final       History reviewed. No pertinent past medical history.  Social History     Socioeconomic History    Marital status:    Tobacco Use    Smoking status: Former    Smokeless tobacco: Never   Substance and Sexual Activity    Alcohol use: Yes     Comment: ocassionally    Drug use: Never    Sexual activity: Not Currently     Social Determinants of Health     Financial Resource Strain: Patient Declined (11/25/2023)    Overall Financial Resource Strain (CARDIA)     Difficulty of Paying Living Expenses: Patient declined   Food Insecurity: Patient Declined (11/25/2023)    Hunger Vital Sign     Worried About Running Out of Food in the Last Year: Patient declined     Ran Out of Food in the Last Year: Patient declined   Transportation Needs: Patient Declined (11/25/2023)    PRAPARE - Transportation     Lack of Transportation (Medical): Patient declined     Lack of Transportation (Non-Medical): Patient declined   Physical Activity: Unknown (11/25/2023)    Exercise Vital Sign     Days of Exercise per Week: Patient declined   Stress: Patient Declined (11/25/2023)    [employer] [place] [employer] - Occupational Stress Questionnaire     Feeling of Stress : Patient declined   Housing Stability: Unknown (11/25/2023)    Housing Stability Vital Sign     Unable to Pay for Housing in the Last Year: Patient refused     Unstable Housing in the Last Year: Patient refused     Past Surgical History:   Procedure Laterality  Date    BREAST CYST ASPIRATION Left     CATARACT EXTRACTION Bilateral     Both done in 1/2024    left wrist      arthritis    TUBAL LIGATION       Family History   Problem Relation Name Age of Onset    Eczema Neg Hx      Lupus Neg Hx      Psoriasis Neg Hx      Melanoma Neg Hx         All of your core healthy metrics are met.      Review of patient's allergies indicates:  No Known Allergies    Current Outpatient Medications:     ascorbate calcium, vitamin C, 500 mg Tab, Vitamin C 500 mg tablet  Take 1 tablet every day by oral route., Disp: , Rfl:     calcium-vitamin D3 (OS-FERNANDA 500 + D3) 500 mg-5 mcg (200 unit) per tablet, Take 1 tablet by mouth 2 (two) times daily with meals., Disp: , Rfl:     famotidine (PEPCID) 40 MG tablet, Take 1 tablet (40 mg total) by mouth once daily., Disp: 30 tablet, Rfl: 11    magnesium 200 mg Tab, magnesium, Disp: , Rfl:     multivit-min-iron-FA-K.gin 18 mg iron-400 mcg-50 mg Tab, Take 1 tablet by mouth once daily. , Disp: , Rfl:     rosuvastatin (CRESTOR) 5 MG tablet, Take 1 tablet (5 mg total) by mouth once daily., Disp: 90 tablet, Rfl: 1    mupirocin (BACTROBAN) 2 % ointment, Apply topically 3 (three) times daily. (Patient not taking: Reported on 8/19/2024), Disp: 22 g, Rfl: 0    pantoprazole (PROTONIX) 40 MG tablet, Take 1 tablet (40 mg total) by mouth once daily., Disp: 90 tablet, Rfl: 1    sertraline (ZOLOFT) 50 MG tablet, Take 1 tablet (50 mg total) by mouth once daily., Disp: 90 tablet, Rfl: 0    Review of Systems   Constitutional:  Negative for activity change and unexpected weight change.   HENT:  Positive for hearing loss. Negative for rhinorrhea and trouble swallowing.    Eyes:  Negative for discharge and visual disturbance.   Respiratory:  Negative for chest tightness and wheezing.    Cardiovascular:  Negative for chest pain and palpitations.   Gastrointestinal:  Negative for blood in stool, constipation, diarrhea and vomiting.   Endocrine: Negative for polydipsia and  "polyuria.   Genitourinary:  Negative for difficulty urinating, dysuria, hematuria and menstrual problem.   Musculoskeletal:  Positive for arthralgias and joint swelling. Negative for neck pain.   Neurological:  Negative for weakness and headaches.   Psychiatric/Behavioral:  Negative for confusion and dysphoric mood.           Objective:      Vitals:    08/19/24 1300   BP: 106/62   Pulse: 76   SpO2: 96%   Weight: 54.5 kg (120 lb 2 oz)   Height: 5' 0.5" (1.537 m)     Physical Exam  Vitals and nursing note reviewed.   Constitutional:       General: She is not in acute distress.     Appearance: Normal appearance. She is well-developed and normal weight.   HENT:      Head: Normocephalic.      Right Ear: External ear normal.      Left Ear: External ear normal.   Eyes:      Conjunctiva/sclera: Conjunctivae normal.      Pupils: Pupils are equal, round, and reactive to light.   Neck:      Vascular: No JVD.   Cardiovascular:      Rate and Rhythm: Normal rate and regular rhythm.      Heart sounds: No murmur heard.  Pulmonary:      Effort: Pulmonary effort is normal.      Breath sounds: Normal breath sounds.   Abdominal:      General: Bowel sounds are normal.      Palpations: Abdomen is soft.   Musculoskeletal:         General: No deformity.      Cervical back: Normal range of motion and neck supple.      Lumbar back: Decreased range of motion. Negative right straight leg raise test and negative left straight leg raise test.   Lymphadenopathy:      Cervical: No cervical adenopathy.   Skin:     General: Skin is warm and dry.      Findings: No rash.   Neurological:      Mental Status: She is alert and oriented to person, place, and time.      Gait: Gait normal.   Psychiatric:         Speech: Speech normal.         Behavior: Behavior normal.           Assessment:       1. High risk medication use    2. Major depressive disorder with single episode, in remission    3. Gastroesophageal reflux disease, unspecified whether esophagitis " present    4. Dyslipidemia    5. Anemia, unspecified type    6. Levoscoliosis         Plan:       High risk medication use  -     Comprehensive Metabolic Panel; Future; Expected date: 08/19/2024    Major depressive disorder with single episode, in remission  Comments:  continue zoloft  Orders:  -     sertraline (ZOLOFT) 50 MG tablet; Take 1 tablet (50 mg total) by mouth once daily.  Dispense: 90 tablet; Refill: 0    Gastroesophageal reflux disease, unspecified whether esophagitis present  Comments:  protonix  Orders:  -     pantoprazole (PROTONIX) 40 MG tablet; Take 1 tablet (40 mg total) by mouth once daily.  Dispense: 90 tablet; Refill: 1    Dyslipidemia  Comments:  crestor  Orders:  -     Lipid Panel; Future; Expected date: 08/19/2024    Anemia, unspecified type  Comments:  labs  Orders:  -     TSH w/reflex to FT4; Future; Expected date: 08/19/2024  -     Ferritin; Future; Expected date: 08/19/2024  -     Iron and TIBC; Future; Expected date: 08/19/2024    Levoscoliosis  Comments:  refer to pt  Orders:  -     Ambulatory referral/consult to Physical/Occupational Therapy; Future; Expected date: 08/26/2024    Other orders  -     Hemoglobin A1C      Follow up in about 4 months (around 12/19/2024), or if symptoms worsen or fail to improve, for medication management.        8/27/2024 Bonnie Arita

## 2024-08-22 LAB
ALBUMIN SERPL-MCNC: 4.6 G/DL (ref 3.6–5.1)
ALBUMIN/GLOB SERPL: 1.8 (CALC) (ref 1–2.5)
ALP SERPL-CCNC: 72 U/L (ref 37–153)
ALT SERPL-CCNC: 16 U/L (ref 6–29)
AST SERPL-CCNC: 20 U/L (ref 10–35)
BILIRUB SERPL-MCNC: 1 MG/DL (ref 0.2–1.2)
BUN SERPL-MCNC: 20 MG/DL (ref 7–25)
BUN/CREAT SERPL: NORMAL (CALC) (ref 6–22)
CALCIUM SERPL-MCNC: 9.7 MG/DL (ref 8.6–10.4)
CHLORIDE SERPL-SCNC: 100 MMOL/L (ref 98–110)
CHOLEST SERPL-MCNC: 194 MG/DL
CHOLEST/HDLC SERPL: 2.6 (CALC)
CO2 SERPL-SCNC: 27 MMOL/L (ref 20–32)
CREAT SERPL-MCNC: 0.63 MG/DL (ref 0.6–1)
EGFR: 94 ML/MIN/1.73M2
FERRITIN SERPL-MCNC: 86 NG/ML (ref 16–288)
GLOBULIN SER CALC-MCNC: 2.5 G/DL (CALC) (ref 1.9–3.7)
GLUCOSE SERPL-MCNC: 97 MG/DL (ref 65–99)
HBA1C MFR BLD: 5.5 % OF TOTAL HGB
HDLC SERPL-MCNC: 74 MG/DL
IRON SATN MFR SERPL: 34 % (CALC) (ref 16–45)
IRON SERPL-MCNC: 132 MCG/DL (ref 45–160)
LDLC SERPL CALC-MCNC: 101 MG/DL (CALC)
NONHDLC SERPL-MCNC: 120 MG/DL (CALC)
POTASSIUM SERPL-SCNC: 4.1 MMOL/L (ref 3.5–5.3)
PROT SERPL-MCNC: 7.1 G/DL (ref 6.1–8.1)
SODIUM SERPL-SCNC: 137 MMOL/L (ref 135–146)
TIBC SERPL-MCNC: 389 MCG/DL (CALC) (ref 250–450)
TRIGL SERPL-MCNC: 94 MG/DL
TSH SERPL-ACNC: 1.92 MIU/L (ref 0.4–4.5)

## 2024-08-27 ENCOUNTER — TELEPHONE (OUTPATIENT)
Dept: FAMILY MEDICINE | Facility: CLINIC | Age: 74
End: 2024-08-27
Payer: MEDICARE

## 2024-08-27 NOTE — TELEPHONE ENCOUNTER
----- Message from Bonnie Arita NP sent at 8/27/2024 12:35 AM CDT -----  Labs are within normal limits. You are not anemic. No diabetes detected.

## 2024-08-27 NOTE — TELEPHONE ENCOUNTER
Spoke to patient with result per Bonnie. Said she is still concerned about blood sugar and A1C because they are on the higher end of normal. Wants to know if she can do anything else to get her sugar lower

## 2024-09-25 DIAGNOSIS — Z00.00 ENCOUNTER FOR MEDICARE ANNUAL WELLNESS EXAM: ICD-10-CM

## 2024-09-26 ENCOUNTER — PATIENT MESSAGE (OUTPATIENT)
Dept: FAMILY MEDICINE | Facility: CLINIC | Age: 74
End: 2024-09-26
Payer: MEDICARE

## 2024-09-30 ENCOUNTER — CLINICAL SUPPORT (OUTPATIENT)
Dept: FAMILY MEDICINE | Facility: CLINIC | Age: 74
End: 2024-09-30
Payer: MEDICARE

## 2024-09-30 DIAGNOSIS — Z23 NEED FOR VACCINATION: Primary | ICD-10-CM

## 2024-09-30 PROCEDURE — G0008 ADMIN INFLUENZA VIRUS VAC: HCPCS | Mod: S$GLB,,, | Performed by: NURSE PRACTITIONER

## 2024-09-30 PROCEDURE — 90653 IIV ADJUVANT VACCINE IM: CPT | Mod: S$GLB,,, | Performed by: NURSE PRACTITIONER

## 2024-11-07 DIAGNOSIS — Z13.820 OSTEOPOROSIS SCREENING: Primary | ICD-10-CM

## 2024-11-08 DIAGNOSIS — Z12.31 ENCOUNTER FOR SCREENING MAMMOGRAM FOR MALIGNANT NEOPLASM OF BREAST: Primary | ICD-10-CM

## 2024-11-15 LAB — CRC RECOMMENDATION EXT: NORMAL

## 2024-11-19 ENCOUNTER — PATIENT MESSAGE (OUTPATIENT)
Dept: FAMILY MEDICINE | Facility: CLINIC | Age: 74
End: 2024-11-19
Payer: MEDICARE

## 2024-11-19 ENCOUNTER — PATIENT OUTREACH (OUTPATIENT)
Dept: ADMINISTRATIVE | Facility: HOSPITAL | Age: 74
End: 2024-11-19
Payer: MEDICARE

## 2024-11-20 ENCOUNTER — PATIENT MESSAGE (OUTPATIENT)
Dept: FAMILY MEDICINE | Facility: CLINIC | Age: 74
End: 2024-11-20
Payer: MEDICARE

## 2024-11-20 DIAGNOSIS — F32.5 MAJOR DEPRESSIVE DISORDER WITH SINGLE EPISODE, IN REMISSION: ICD-10-CM

## 2024-11-20 RX ORDER — SERTRALINE HYDROCHLORIDE 50 MG/1
50 TABLET, FILM COATED ORAL DAILY
Qty: 90 TABLET | Refills: 0 | Status: SHIPPED | OUTPATIENT
Start: 2024-11-20 | End: 2025-02-18

## 2024-11-22 ENCOUNTER — HOSPITAL ENCOUNTER (OUTPATIENT)
Dept: RADIOLOGY | Facility: HOSPITAL | Age: 74
Discharge: HOME | End: 2024-11-22
Attending: STUDENT IN AN ORGANIZED HEALTH CARE EDUCATION/TRAINING PROGRAM
Payer: MEDICARE

## 2024-11-22 VITALS — WEIGHT: 120 LBS | BODY MASS INDEX: 23.56 KG/M2 | HEIGHT: 60 IN

## 2024-11-22 DIAGNOSIS — Z13.820 OSTEOPOROSIS SCREENING: ICD-10-CM

## 2024-11-22 DIAGNOSIS — Z12.31 ENCOUNTER FOR SCREENING MAMMOGRAM FOR MALIGNANT NEOPLASM OF BREAST: ICD-10-CM

## 2024-11-22 PROCEDURE — 77067 SCR MAMMO BI INCL CAD: CPT | Mod: TC,PO

## 2024-11-22 PROCEDURE — 77080 DXA BONE DENSITY AXIAL: CPT | Mod: 26,,, | Performed by: RADIOLOGY

## 2024-11-22 PROCEDURE — 77067 SCR MAMMO BI INCL CAD: CPT | Mod: 26,,, | Performed by: RADIOLOGY

## 2024-11-22 PROCEDURE — 77063 BREAST TOMOSYNTHESIS BI: CPT | Mod: 26,,, | Performed by: RADIOLOGY

## 2024-11-22 PROCEDURE — 77080 DXA BONE DENSITY AXIAL: CPT | Mod: TC,PO

## 2024-11-26 ENCOUNTER — TELEPHONE (OUTPATIENT)
Dept: FAMILY MEDICINE | Facility: CLINIC | Age: 74
End: 2024-11-26
Payer: MEDICARE

## 2024-12-11 ENCOUNTER — PATIENT MESSAGE (OUTPATIENT)
Dept: FAMILY MEDICINE | Facility: CLINIC | Age: 74
End: 2024-12-11
Payer: MEDICARE

## 2024-12-11 DIAGNOSIS — E78.5 HYPERLIPIDEMIA, UNSPECIFIED HYPERLIPIDEMIA TYPE: ICD-10-CM

## 2024-12-12 RX ORDER — ROSUVASTATIN CALCIUM 5 MG/1
5 TABLET, COATED ORAL DAILY
Qty: 90 TABLET | Refills: 1 | Status: SHIPPED | OUTPATIENT
Start: 2024-12-12 | End: 2025-12-12

## 2025-01-07 ENCOUNTER — OFFICE VISIT (OUTPATIENT)
Dept: FAMILY MEDICINE | Facility: CLINIC | Age: 75
End: 2025-01-07
Payer: MEDICARE

## 2025-01-07 VITALS
SYSTOLIC BLOOD PRESSURE: 102 MMHG | OXYGEN SATURATION: 100 % | BODY MASS INDEX: 23.29 KG/M2 | WEIGHT: 118.63 LBS | DIASTOLIC BLOOD PRESSURE: 68 MMHG | HEART RATE: 64 BPM | HEIGHT: 60 IN

## 2025-01-07 DIAGNOSIS — K21.9 GASTROESOPHAGEAL REFLUX DISEASE, UNSPECIFIED WHETHER ESOPHAGITIS PRESENT: ICD-10-CM

## 2025-01-07 DIAGNOSIS — M25.562 LEFT KNEE PAIN, UNSPECIFIED CHRONICITY: Primary | ICD-10-CM

## 2025-01-07 DIAGNOSIS — M41.80 LEVOSCOLIOSIS: ICD-10-CM

## 2025-01-07 DIAGNOSIS — M54.30 SCIATICA, UNSPECIFIED LATERALITY: ICD-10-CM

## 2025-01-07 DIAGNOSIS — E78.5 HYPERLIPIDEMIA, UNSPECIFIED HYPERLIPIDEMIA TYPE: ICD-10-CM

## 2025-01-07 DIAGNOSIS — F32.5 MAJOR DEPRESSIVE DISORDER WITH SINGLE EPISODE, IN REMISSION: ICD-10-CM

## 2025-01-07 PROCEDURE — 99214 OFFICE O/P EST MOD 30 MIN: CPT | Mod: S$GLB,,, | Performed by: NURSE PRACTITIONER

## 2025-01-07 PROCEDURE — 1125F AMNT PAIN NOTED PAIN PRSNT: CPT | Mod: CPTII,S$GLB,, | Performed by: NURSE PRACTITIONER

## 2025-01-07 PROCEDURE — 3074F SYST BP LT 130 MM HG: CPT | Mod: CPTII,S$GLB,, | Performed by: NURSE PRACTITIONER

## 2025-01-07 PROCEDURE — 1160F RVW MEDS BY RX/DR IN RCRD: CPT | Mod: CPTII,S$GLB,, | Performed by: NURSE PRACTITIONER

## 2025-01-07 PROCEDURE — 3288F FALL RISK ASSESSMENT DOCD: CPT | Mod: CPTII,S$GLB,, | Performed by: NURSE PRACTITIONER

## 2025-01-07 PROCEDURE — 3008F BODY MASS INDEX DOCD: CPT | Mod: CPTII,S$GLB,, | Performed by: NURSE PRACTITIONER

## 2025-01-07 PROCEDURE — 3078F DIAST BP <80 MM HG: CPT | Mod: CPTII,S$GLB,, | Performed by: NURSE PRACTITIONER

## 2025-01-07 PROCEDURE — 1101F PT FALLS ASSESS-DOCD LE1/YR: CPT | Mod: CPTII,S$GLB,, | Performed by: NURSE PRACTITIONER

## 2025-01-07 PROCEDURE — 1159F MED LIST DOCD IN RCRD: CPT | Mod: CPTII,S$GLB,, | Performed by: NURSE PRACTITIONER

## 2025-01-07 RX ORDER — METHYLPREDNISOLONE 4 MG/1
TABLET ORAL
Qty: 21 EACH | Refills: 0 | Status: SHIPPED | OUTPATIENT
Start: 2025-01-07 | End: 2025-01-28

## 2025-01-07 RX ORDER — CELECOXIB 200 MG/1
200 CAPSULE ORAL 2 TIMES DAILY
Qty: 60 CAPSULE | Refills: 1 | Status: SHIPPED | OUTPATIENT
Start: 2025-01-07

## 2025-01-08 ENCOUNTER — HOSPITAL ENCOUNTER (OUTPATIENT)
Dept: RADIOLOGY | Facility: HOSPITAL | Age: 75
Discharge: HOME | End: 2025-01-08
Attending: NURSE PRACTITIONER
Payer: MEDICARE

## 2025-01-08 ENCOUNTER — HOSPITAL ENCOUNTER (OUTPATIENT)
Dept: RADIOLOGY | Facility: CLINIC | Age: 75
Discharge: HOME | End: 2025-01-08
Attending: NURSE PRACTITIONER
Payer: MEDICARE

## 2025-01-08 DIAGNOSIS — M41.80 LEVOSCOLIOSIS: ICD-10-CM

## 2025-01-08 PROCEDURE — 72082 X-RAY EXAM ENTIRE SPI 2/3 VW: CPT | Mod: TC

## 2025-01-08 PROCEDURE — 72082 X-RAY EXAM ENTIRE SPI 2/3 VW: CPT | Mod: 26,,, | Performed by: RADIOLOGY

## 2025-01-10 DIAGNOSIS — M25.562 LEFT KNEE PAIN, UNSPECIFIED CHRONICITY: Primary | ICD-10-CM

## 2025-01-13 ENCOUNTER — OFFICE VISIT (OUTPATIENT)
Dept: ORTHOPEDICS | Facility: CLINIC | Age: 75
End: 2025-01-13
Payer: MEDICARE

## 2025-01-13 ENCOUNTER — HOSPITAL ENCOUNTER (OUTPATIENT)
Dept: RADIOLOGY | Facility: HOSPITAL | Age: 75
Discharge: HOME | End: 2025-01-13
Attending: ORTHOPAEDIC SURGERY
Payer: MEDICARE

## 2025-01-13 DIAGNOSIS — M71.22 BAKER CYST, LEFT: Primary | ICD-10-CM

## 2025-01-13 DIAGNOSIS — M25.562 LEFT KNEE PAIN, UNSPECIFIED CHRONICITY: ICD-10-CM

## 2025-01-13 PROCEDURE — 99999 PR PBB SHADOW E&M-EST. PATIENT-LVL II: CPT | Mod: PBBFAC,,, | Performed by: ORTHOPAEDIC SURGERY

## 2025-01-13 PROCEDURE — 73562 X-RAY EXAM OF KNEE 3: CPT | Mod: 26,59,RT, | Performed by: RADIOLOGY

## 2025-01-13 PROCEDURE — 73564 X-RAY EXAM KNEE 4 OR MORE: CPT | Mod: TC,PO,LT

## 2025-01-13 PROCEDURE — 99204 OFFICE O/P NEW MOD 45 MIN: CPT | Mod: S$GLB,,, | Performed by: ORTHOPAEDIC SURGERY

## 2025-01-13 PROCEDURE — 1160F RVW MEDS BY RX/DR IN RCRD: CPT | Mod: CPTII,S$GLB,, | Performed by: ORTHOPAEDIC SURGERY

## 2025-01-13 PROCEDURE — 1159F MED LIST DOCD IN RCRD: CPT | Mod: CPTII,S$GLB,, | Performed by: ORTHOPAEDIC SURGERY

## 2025-01-13 PROCEDURE — 73564 X-RAY EXAM KNEE 4 OR MORE: CPT | Mod: 26,LT,, | Performed by: RADIOLOGY

## 2025-01-13 NOTE — PROGRESS NOTES
SUBJECTIVE:    Patient ID: Sylvia Maxwell is a 74 y.o. female.    Chief Complaint: Follow-up (No bottles//Pt is here for a four month follow up//KE)    History of Present Illness    CHIEF COMPLAINT:  74 year old female presents today for check up  MUSCULOSKELETAL PAIN:  She reports diffuse pain affecting multiple areas including hands, wrists, back, neck, legs, and knees. She is unable to bend one finger, and another finger exhibits popping when bent. She experiences wrist pain with holding or bending objects, status post carpal tunnel surgery 2-3 years ago. Knee pain may be related to a Baker's cyst. She has been diagnosed with osteoarthritis and inquires about possibility of rheumatoid arthritis despite negative ANSLEY results. She also reports having levoscoliosis with unclear degree of curvature.    SLEEP:  She reports variable sleep quality that correlates with pain levels, particularly noting difficulty when lying on right side due to right leg and knee pain.    UPPER RESPIRATORY:  She reports experiencing sinus drainage following exposure to family members with flu and pneumonia.      ROS:  General: -fever, -chills, -fatigue, -weight gain, -weight loss  Eyes: -vision changes, -redness, -discharge  ENT: -ear pain, +nasal congestion, -sore throat  Cardiovascular: -chest pain, -palpitations, -lower extremity edema  Respiratory: -cough, -shortness of breath  Gastrointestinal: -abdominal pain, -nausea, -vomiting, -diarrhea, -constipation, -blood in stool  Genitourinary: -dysuria, -hematuria, -frequency  Musculoskeletal: +joint pain, +muscle pain  Skin: -rash, -lesion  Neurological: -headache, -dizziness, -numbness, -tingling  Psychiatric: -anxiety, -depression, +sleep difficulty         Patient Outreach on 11/19/2024   Component Date Value Ref Range Status    CRC Recommendation External 11/15/2024 No repeat colonoscopy   Final   Office Visit on 08/19/2024   Component Date Value Ref Range Status    Glucose  08/21/2024 97  65 - 99 mg/dL Final    BUN 08/21/2024 20  7 - 25 mg/dL Final    Creatinine 08/21/2024 0.63  0.60 - 1.00 mg/dL Final    eGFR 08/21/2024 94  > OR = 60 mL/min/1.73m2 Final    BUN/Creatinine Ratio 08/21/2024 SEE NOTE:  6 - 22 (calc) Final    Sodium 08/21/2024 137  135 - 146 mmol/L Final    Potassium 08/21/2024 4.1  3.5 - 5.3 mmol/L Final    Chloride 08/21/2024 100  98 - 110 mmol/L Final    CO2 08/21/2024 27  20 - 32 mmol/L Final    Calcium 08/21/2024 9.7  8.6 - 10.4 mg/dL Final    Total Protein 08/21/2024 7.1  6.1 - 8.1 g/dL Final    Albumin 08/21/2024 4.6  3.6 - 5.1 g/dL Final    Globulin, Total 08/21/2024 2.5  1.9 - 3.7 g/dL (calc) Final    Albumin/Globulin Ratio 08/21/2024 1.8  1.0 - 2.5 (calc) Final    Total Bilirubin 08/21/2024 1.0  0.2 - 1.2 mg/dL Final    Alkaline Phosphatase 08/21/2024 72  37 - 153 U/L Final    AST 08/21/2024 20  10 - 35 U/L Final    ALT 08/21/2024 16  6 - 29 U/L Final    Cholesterol 08/21/2024 194  <200 mg/dL Final    HDL 08/21/2024 74  > OR = 50 mg/dL Final    Triglycerides 08/21/2024 94  <150 mg/dL Final    LDL Cholesterol 08/21/2024 101 (H)  mg/dL (calc) Final    HDL/Cholesterol Ratio 08/21/2024 2.6  <5.0 (calc) Final    Non HDL Chol. (LDL+VLDL) 08/21/2024 120  <130 mg/dL (calc) Final    TSH w/reflex to FT4 08/21/2024 1.92  0.40 - 4.50 mIU/L Final    Ferritin 08/21/2024 86  16 - 288 ng/mL Final    Iron 08/21/2024 132  45 - 160 mcg/dL Final    TIBC 08/21/2024 389  250 - 450 mcg/dL (calc) Final    Iron Saturation 08/21/2024 34  16 - 45 % (calc) Final    Hemoglobin A1C 08/21/2024 5.5  <5.7 % of total Hgb Final       History reviewed. No pertinent past medical history.  Social History     Socioeconomic History    Marital status:    Tobacco Use    Smoking status: Former    Smokeless tobacco: Never   Substance and Sexual Activity    Alcohol use: Yes     Comment: ocassionally    Drug use: Never    Sexual activity: Not Currently     Social Drivers of Health     Financial  Resource Strain: Patient Declined (11/25/2023)    Overall Financial Resource Strain (CARDIA)     Difficulty of Paying Living Expenses: Patient declined   Food Insecurity: Patient Declined (11/25/2023)    Hunger Vital Sign     Worried About Running Out of Food in the Last Year: Patient declined     Ran Out of Food in the Last Year: Patient declined   Transportation Needs: Patient Declined (11/25/2023)    PRAPARE - Transportation     Lack of Transportation (Medical): Patient declined     Lack of Transportation (Non-Medical): Patient declined   Physical Activity: Unknown (11/25/2023)    Exercise Vital Sign     Days of Exercise per Week: Patient declined   Stress: Patient Declined (11/25/2023)    [facility] [employer] - Occupational Stress Questionnaire     Feeling of Stress : Patient declined   Housing Stability: Unknown (11/25/2023)    Housing Stability Vital Sign     Unable to Pay for Housing in the Last Year: Patient refused     Unstable Housing in the Last Year: Patient refused     Past Surgical History:   Procedure Laterality Date    BREAST CYST ASPIRATION Left     CATARACT EXTRACTION Bilateral     Both done in 1/2024    COLONOSCOPY  11/15/2024    left wrist      arthritis    TUBAL LIGATION       Family History   Problem Relation Name Age of Onset    Eczema Neg Hx      Lupus Neg Hx      Psoriasis Neg Hx      Melanoma Neg Hx         All of your core healthy metrics are met.      Review of patient's allergies indicates:  No Known Allergies    Current Outpatient Medications:     ascorbate calcium, vitamin C, 500 mg Tab, Vitamin C 500 mg tablet  Take 1 tablet every day by oral route., Disp: , Rfl:     calcium-vitamin D3 (OS-FERNANDA 500 + D3) 500 mg-5 mcg (200 unit) per tablet, Take 1 tablet by mouth 2 (two) times daily with meals., Disp: , Rfl:     celecoxib (CELEBREX) 200 MG capsule, Take 1 capsule (200 mg total) by mouth 2 (two) times daily., Disp: 60 capsule, Rfl: 1    famotidine (PEPCID) 40 MG  tablet, Take 1 tablet (40 mg total) by mouth once daily., Disp: 30 tablet, Rfl: 11    magnesium 200 mg Tab, magnesium, Disp: , Rfl:     methylPREDNISolone (MEDROL DOSEPACK) 4 mg tablet, use as directed, Disp: 21 each, Rfl: 0    multivit-min-iron-FA-K.gin 18 mg iron-400 mcg-50 mg Tab, Take 1 tablet by mouth once daily. , Disp: , Rfl:     mupirocin (BACTROBAN) 2 % ointment, Apply topically 3 (three) times daily. (Patient not taking: Reported on 8/19/2024), Disp: 22 g, Rfl: 0    pantoprazole (PROTONIX) 40 MG tablet, Take 1 tablet (40 mg total) by mouth once daily., Disp: 90 tablet, Rfl: 1    rosuvastatin (CRESTOR) 5 MG tablet, Take 1 tablet (5 mg total) by mouth once daily., Disp: 90 tablet, Rfl: 1    sertraline (ZOLOFT) 50 MG tablet, Take 1 tablet (50 mg total) by mouth once daily., Disp: 90 tablet, Rfl: 0    Objective:      Vitals:    01/07/25 1356   BP: 102/68   Pulse: 64   SpO2: 100%   Weight: 53.8 kg (118 lb 9.6 oz)   Height: 5' (1.524 m)     Physical Exam    General: No acute distress. Well-developed. Well-nourished.  HENT: Normocephalic. Atraumatic. Nares patent. Moist oral mucosa. Hoarse voice.  Ears: Bilateral TMs clear. Bilateral EACs clear.  Cardiovascular: Regular rate. Regular rhythm. No murmurs. No rubs. No gallops. Normal S1, S2.  Respiratory: Normal respiratory effort. Clear to auscultation bilaterally. No rales. No rhonchi. No wheezing.  Abdomen: Soft. Non-tender. Non-distended. Normoactive bowel sounds.  Musculoskeletal: No  obvious deformity. scoliosis, decreased range of motion. Crepitus to bilateral knees  Extremities: No lower extremity edema.  Neurological: Alert & oriented x3. No slurred speech. Normal gait.  Psychiatric: Normal mood. Normal affect. Good insight. Good judgment.  Skin: Warm. Dry. No rash.       Assessment:       Assessment & Plan    - Assessed diffuse pain symptoms in hands, wrist, back, and legs  - Considered osteoarthritis as primary cause of symptoms, noting patient's age and  previous rheumatology evaluation  - Determined need for aggressive anti-inflammatory treatment due to widespread pain affecting mobility  - Opted for short-term steroid course to address acute inflammation, followed by daily NSAID therapy  - Planned x-rays to assess degree of spinal curvature related to levoscoliosis  - Acknowledged potential contribution of knee issues to leg and back pain  - Discussed possibility of other autoimmune disorders, noting variability in immune testing results over time    OSTEOARTHRITIS:  - Explained osteoarthritis as age-related bone changes, typically present in Americans by age 40.  - Discussed exacerbations in osteoarthritis, including potential triggers like cold weather.  - Explained goal of osteoarthritis treatment is to manage inflammation and prevent progressive deformities.  - Started prednisone (steroid course) for 6-7 days.  - Start Celebrex (celecoxib) twice daily after completing steroid course, to be taken with food or milk.  - Referred to Dr. Gaspar for knee evaluation.  - Referred to Dr. Aguilera for hand evaluation.    AUTOIMMUNE DISORDER:  - Discussed variability of autoimmune testing results over time and potential challenges in diagnosis.    LEVOSCOLIOSIS:  - X-rays ordered to measure degree of curvature related to levoscoliosis.    INFLUENZA PREVENTION:  - Contact the office if influenza symptoms develop for potential antiviral prescription (Tamiflu or Xofluza).  - Contact the office via message if influenza symptoms occur on weekends, noting nurse availability.       Plan:       Left knee pain, unspecified chronicity  Comments:  celebrex  Orders:  -     Ambulatory referral/consult to Orthopedics; Future; Expected date: 01/14/2025  -     CBC Auto Differential; Future; Expected date: 01/07/2025  -     Comprehensive Metabolic Panel; Future; Expected date: 01/07/2025  -     TSH w/reflex to FT4; Future; Expected date: 01/07/2025  -     Urinalysis, Reflex to Urine  Culture Urine, Clean Catch; Future; Expected date: 01/07/2025  -     Lipid Panel; Future; Expected date: 01/07/2025  -     ANSLEY Screen w/Reflex; Future; Expected date: 01/12/2025  -     Rheumatoid Factor; Future; Expected date: 01/12/2025  -     Sedimentation rate; Future; Expected date: 01/12/2025  -     CBC Auto Differential; Future; Expected date: 01/12/2025  -     Comprehensive Metabolic Panel; Future; Expected date: 01/12/2025  -     Lipid Panel; Future; Expected date: 01/12/2025  -     TSH w/reflex to FT4; Future; Expected date: 01/12/2025  -     Microalbumin/Creatinine Ratio, Urine; Future; Expected date: 01/12/2025  -     Urinalysis, Reflex to Urine Culture Urine, Clean Catch; Future; Expected date: 01/12/2025    Major depressive disorder with single episode, in remission  Comments:  sertraline  Orders:  -     CBC Auto Differential; Future; Expected date: 01/07/2025  -     Comprehensive Metabolic Panel; Future; Expected date: 01/07/2025  -     TSH w/reflex to FT4; Future; Expected date: 01/07/2025  -     Urinalysis, Reflex to Urine Culture Urine, Clean Catch; Future; Expected date: 01/07/2025  -     Lipid Panel; Future; Expected date: 01/07/2025  -     ANSLEY Screen w/Reflex; Future; Expected date: 01/12/2025  -     Rheumatoid Factor; Future; Expected date: 01/12/2025  -     Sedimentation rate; Future; Expected date: 01/12/2025  -     CBC Auto Differential; Future; Expected date: 01/12/2025  -     Comprehensive Metabolic Panel; Future; Expected date: 01/12/2025  -     Lipid Panel; Future; Expected date: 01/12/2025  -     TSH w/reflex to FT4; Future; Expected date: 01/12/2025  -     Microalbumin/Creatinine Ratio, Urine; Future; Expected date: 01/12/2025  -     Urinalysis, Reflex to Urine Culture Urine, Clean Catch; Future; Expected date: 01/12/2025    Hyperlipidemia, unspecified hyperlipidemia type  Comments:  crestor  Orders:  -     CBC Auto Differential; Future; Expected date: 01/07/2025  -     Comprehensive  Metabolic Panel; Future; Expected date: 01/07/2025  -     TSH w/reflex to FT4; Future; Expected date: 01/07/2025  -     Urinalysis, Reflex to Urine Culture Urine, Clean Catch; Future; Expected date: 01/07/2025  -     Lipid Panel; Future; Expected date: 01/07/2025  -     ANSLEY Screen w/Reflex; Future; Expected date: 01/12/2025  -     Rheumatoid Factor; Future; Expected date: 01/12/2025  -     Sedimentation rate; Future; Expected date: 01/12/2025  -     CBC Auto Differential; Future; Expected date: 01/12/2025  -     Comprehensive Metabolic Panel; Future; Expected date: 01/12/2025  -     Lipid Panel; Future; Expected date: 01/12/2025  -     TSH w/reflex to FT4; Future; Expected date: 01/12/2025  -     Microalbumin/Creatinine Ratio, Urine; Future; Expected date: 01/12/2025  -     Urinalysis, Reflex to Urine Culture Urine, Clean Catch; Future; Expected date: 01/12/2025    Sciatica, unspecified laterality  Comments:  medrol. then try celebrex  Orders:  -     CBC Auto Differential; Future; Expected date: 01/07/2025  -     Comprehensive Metabolic Panel; Future; Expected date: 01/07/2025  -     TSH w/reflex to FT4; Future; Expected date: 01/07/2025  -     Urinalysis, Reflex to Urine Culture Urine, Clean Catch; Future; Expected date: 01/07/2025  -     Lipid Panel; Future; Expected date: 01/07/2025    Levoscoliosis  -     X-Ray Scoliosis Complete; Future; Expected date: 01/07/2025    Gastroesophageal reflux disease, unspecified whether esophagitis present  Comments:  protonix, pepcid  Orders:  -     ANSLEY Screen w/Reflex; Future; Expected date: 01/12/2025  -     Rheumatoid Factor; Future; Expected date: 01/12/2025  -     Sedimentation rate; Future; Expected date: 01/12/2025  -     CBC Auto Differential; Future; Expected date: 01/12/2025  -     Comprehensive Metabolic Panel; Future; Expected date: 01/12/2025  -     Lipid Panel; Future; Expected date: 01/12/2025  -     TSH w/reflex to FT4; Future; Expected date: 01/12/2025  -      Microalbumin/Creatinine Ratio, Urine; Future; Expected date: 01/12/2025  -     Urinalysis, Reflex to Urine Culture Urine, Clean Catch; Future; Expected date: 01/12/2025    Other orders  -     methylPREDNISolone (MEDROL DOSEPACK) 4 mg tablet; use as directed  Dispense: 21 each; Refill: 0  -     celecoxib (CELEBREX) 200 MG capsule; Take 1 capsule (200 mg total) by mouth 2 (two) times daily.  Dispense: 60 capsule; Refill: 1      Follow up in about 3 months (around 4/7/2025), or if symptoms worsen or fail to improve, for medication management.        This note was generated with the assistance of ambient listening technology. Verbal consent was obtained by the patient and accompanying visitor(s) for the recording of patient appointment to facilitate this note. I attest to having reviewed and edited the generated note for accuracy, though some syntax or spelling errors may persist. Please contact the author of this note for any clarification.      1/12/2025 Bonnie Arita

## 2025-01-13 NOTE — PROGRESS NOTES
History reviewed. No pertinent past medical history.    Past Surgical History:   Procedure Laterality Date    BREAST CYST ASPIRATION Left     CATARACT EXTRACTION Bilateral     Both done in 1/2024    COLONOSCOPY  11/15/2024    left wrist      arthritis    TUBAL LIGATION         Current Outpatient Medications   Medication Sig    ascorbate calcium, vitamin C, 500 mg Tab Vitamin C 500 mg tablet   Take 1 tablet every day by oral route.    calcium-vitamin D3 (OS-FERNANDA 500 + D3) 500 mg-5 mcg (200 unit) per tablet Take 1 tablet by mouth 2 (two) times daily with meals.    celecoxib (CELEBREX) 200 MG capsule Take 1 capsule (200 mg total) by mouth 2 (two) times daily.    famotidine (PEPCID) 40 MG tablet Take 1 tablet (40 mg total) by mouth once daily.    magnesium 200 mg Tab magnesium    methylPREDNISolone (MEDROL DOSEPACK) 4 mg tablet use as directed    multivit-min-iron-FA-K.gin 18 mg iron-400 mcg-50 mg Tab Take 1 tablet by mouth once daily.     pantoprazole (PROTONIX) 40 MG tablet Take 1 tablet (40 mg total) by mouth once daily.    rosuvastatin (CRESTOR) 5 MG tablet Take 1 tablet (5 mg total) by mouth once daily.    sertraline (ZOLOFT) 50 MG tablet Take 1 tablet (50 mg total) by mouth once daily.     No current facility-administered medications for this visit.       Review of patient's allergies indicates:  No Known Allergies    Family History   Problem Relation Name Age of Onset    Eczema Neg Hx      Lupus Neg Hx      Psoriasis Neg Hx      Melanoma Neg Hx         Social History     Socioeconomic History    Marital status:    Tobacco Use    Smoking status: Former    Smokeless tobacco: Never   Substance and Sexual Activity    Alcohol use: Yes     Comment: ocassionally    Drug use: Never    Sexual activity: Not Currently     Social Drivers of Health     Financial Resource Strain: Patient Declined (11/25/2023)    Overall Financial Resource Strain (CARDIA)     Difficulty of Paying Living Expenses: Patient declined   Food  Insecurity: Patient Declined (11/25/2023)    Hunger Vital Sign     Worried About Running Out of Food in the Last Year: Patient declined     Ran Out of Food in the Last Year: Patient declined   Transportation Needs: Patient Declined (11/25/2023)    PRAPARE - Transportation     Lack of Transportation (Medical): Patient declined     Lack of Transportation (Non-Medical): Patient declined   Physical Activity: Unknown (11/25/2023)    Exercise Vital Sign     Days of Exercise per Week: Patient declined   Stress: Patient Declined (11/25/2023)    [employer] [place] [employer] - Occupational Stress Questionnaire     Feeling of Stress : Patient declined   Housing Stability: Unknown (11/25/2023)    Housing Stability Vital Sign     Unable to Pay for Housing in the Last Year: Patient refused     Unstable Housing in the Last Year: Patient refused       Chief Complaint: No chief complaint on file.      History of present illness:  74-year-old female seen for left knee pain.  Patient has some pain in the back of her knee periodically.  Increased pain with flexion.  History of Baker cyst but never had it drained.  He is currently on a Dosepak already for some back issues.  Pain is actually gone right now.    Prior treatment:  None        Review of Systems:    Constitution: Negative for chills, fever, and sweats.  Negative for unexplained weight loss.    HENT:  Negative for headaches and blurry vision.    Cardiovascular:Negative for chest pain or irregular heart beat. Negative for hypertension.    Respiratory:  Negative for cough and shortness of breath.    Gastrointestinal: Negative for abdominal pain, heartburn, melena, nausea, and vomitting.    Genitourinary:  Negative bladder incontinence and dysuria.    Musculoskeletal:  See HPI    Neurological: Negative for numbness.    Psychiatric/Behavioral: Negative for depression.  The patient is not nervous/anxious.      Endocrine: Negative for polyuria    Hematologic/Lymphatic:  Negative for bleeding problem.  Does not bruise/bleed easily.    Skin: Negative for poor would healing and rash      Physical Examination:    Vital Signs:  There were no vitals filed for this visit.    There is no height or weight on file to calculate BMI.    This a well-developed, well nourished patient in no acute distress.  They are alert and oriented and cooperative to examination.  Pt. walks without an antalgic gait.      Examination of the left knee shows no rashes or erythema. There are no masses ecchymosis or effusion. Patient has full range of motion from 0-130°. Patient is nontender to palpation over lateral joint line and nontender to palpation over the medial joint line. Patient has a - Lachman exam, - anterior drawer exam, and - posterior drawer exam. - Apley exam. Knee is stable to varus and valgus stress. 5 out of 5 motor strength. Palpable distal pulses. Intact light touch sensation. Negative Patellofemoral crepitus      X-rays:  X-rays of the left knee are ordered and reviewed which show possible some very minimal medial narrowing.         Assessment:  Left Baker cyst    Plan:  I reviewed the findings with her today.  Talked about Baker cyst and their diagnosis and treatment.  Patient does not have on at this time.  I told her that if it were to recur in the symptoms are warranted, I want her to notify me and we will get her in with Dr. Bryson for ultrasound-guided aspiration and injection.            All previous pertinent notes including ER visits, physical therapy visits, other orthopedic visits as well as other care for the same musculoskeletal problem were reviewed.  All pertinent lab values and previous imaging was reviewed pertinent to the current visit.    This note was created using [employer] voice recognition software that occasionally misinterpreted phrases or words.    Consult note is delivered via Epic messaging service.

## 2025-02-19 DIAGNOSIS — F32.5 MAJOR DEPRESSIVE DISORDER WITH SINGLE EPISODE, IN REMISSION: ICD-10-CM

## 2025-02-19 RX ORDER — SERTRALINE HYDROCHLORIDE 50 MG/1
50 TABLET, FILM COATED ORAL DAILY
Qty: 90 TABLET | Refills: 0 | Status: SHIPPED | OUTPATIENT
Start: 2025-02-19 | End: 2025-05-20

## 2025-02-22 DIAGNOSIS — Z00.00 ENCOUNTER FOR MEDICARE ANNUAL WELLNESS EXAM: ICD-10-CM

## 2025-03-26 ENCOUNTER — TELEPHONE (OUTPATIENT)
Dept: FAMILY MEDICINE | Facility: CLINIC | Age: 75
End: 2025-03-26
Payer: MEDICARE

## 2025-03-26 NOTE — TELEPHONE ENCOUNTER
----- Message from Adry sent at 3/26/2025  3:06 PM CDT -----  Pt is having surgery on her hand April 9th and needs a clearance and EKG done next week. [phone]

## 2025-03-31 ENCOUNTER — PATIENT MESSAGE (OUTPATIENT)
Dept: FAMILY MEDICINE | Facility: CLINIC | Age: 75
End: 2025-03-31

## 2025-03-31 ENCOUNTER — TELEPHONE (OUTPATIENT)
Dept: CARDIOLOGY | Facility: CLINIC | Age: 75
End: 2025-03-31
Payer: MEDICARE

## 2025-03-31 ENCOUNTER — OFFICE VISIT (OUTPATIENT)
Dept: FAMILY MEDICINE | Facility: CLINIC | Age: 75
End: 2025-03-31
Payer: MEDICARE

## 2025-03-31 VITALS
WEIGHT: 119.63 LBS | DIASTOLIC BLOOD PRESSURE: 72 MMHG | OXYGEN SATURATION: 99 % | HEIGHT: 60 IN | BODY MASS INDEX: 23.49 KG/M2 | SYSTOLIC BLOOD PRESSURE: 112 MMHG | HEART RATE: 63 BPM

## 2025-03-31 DIAGNOSIS — M25.50 ARTHRALGIA, UNSPECIFIED JOINT: ICD-10-CM

## 2025-03-31 DIAGNOSIS — Z01.818 PRE-OPERATIVE CLEARANCE: ICD-10-CM

## 2025-03-31 DIAGNOSIS — M15.1: ICD-10-CM

## 2025-03-31 DIAGNOSIS — Z79.899 HIGH RISK MEDICATION USE: ICD-10-CM

## 2025-03-31 DIAGNOSIS — Z01.818 PRE-OP EVALUATION: ICD-10-CM

## 2025-03-31 DIAGNOSIS — F32.5 MAJOR DEPRESSIVE DISORDER WITH SINGLE EPISODE, IN REMISSION: ICD-10-CM

## 2025-03-31 DIAGNOSIS — K21.9 GASTROESOPHAGEAL REFLUX DISEASE, UNSPECIFIED WHETHER ESOPHAGITIS PRESENT: ICD-10-CM

## 2025-03-31 DIAGNOSIS — G56.00 CARPAL TUNNEL SYNDROME, UNSPECIFIED LATERALITY: ICD-10-CM

## 2025-03-31 DIAGNOSIS — E78.5 HYPERLIPIDEMIA, UNSPECIFIED HYPERLIPIDEMIA TYPE: ICD-10-CM

## 2025-03-31 DIAGNOSIS — R73.01 ELEVATED FASTING BLOOD SUGAR: ICD-10-CM

## 2025-03-31 DIAGNOSIS — E78.5 DYSLIPIDEMIA: Primary | ICD-10-CM

## 2025-03-31 DIAGNOSIS — M41.80 LEVOSCOLIOSIS: ICD-10-CM

## 2025-03-31 LAB
EKG 12-LEAD: NORMAL
PR INTERVAL: NORMAL
PRT AXES: NORMAL
QRS DURATION: NORMAL
QT/QTC: NORMAL
VENTRICULAR RATE: NORMAL

## 2025-03-31 RX ORDER — ACETAMINOPHEN 500 MG
TABLET ORAL
COMMUNITY

## 2025-03-31 NOTE — TELEPHONE ENCOUNTER
----- Message from Eldon sent at 3/31/2025  3:47 PM CDT -----  Regarding: appt  Type:  Sooner Apoointment RequestName of Caller:ptWhprakash is the first available appointment?dept booked Symptoms:annual fu Best Call Back Number:[phone] Information: pt wants to be schedule with . Last visit was 12/19/22 with SUE Torres.  please call to discuss.

## 2025-04-01 RX ORDER — ROSUVASTATIN CALCIUM 5 MG/1
5 TABLET, COATED ORAL DAILY
Qty: 90 TABLET | Refills: 1 | Status: SHIPPED | OUTPATIENT
Start: 2025-04-01 | End: 2026-04-01

## 2025-04-01 RX ORDER — SERTRALINE HYDROCHLORIDE 50 MG/1
50 TABLET, FILM COATED ORAL DAILY
Qty: 90 TABLET | Refills: 0 | Status: SHIPPED | OUTPATIENT
Start: 2025-04-01 | End: 2025-06-30

## 2025-04-01 RX ORDER — PANTOPRAZOLE SODIUM 40 MG/1
40 TABLET, DELAYED RELEASE ORAL DAILY
Qty: 90 TABLET | Refills: 1 | Status: SHIPPED | OUTPATIENT
Start: 2025-04-01

## 2025-04-03 ENCOUNTER — TELEPHONE (OUTPATIENT)
Dept: FAMILY MEDICINE | Facility: CLINIC | Age: 75
End: 2025-04-03
Payer: MEDICARE

## 2025-04-03 NOTE — TELEPHONE ENCOUNTER
----- Message from Adry sent at 4/3/2025  2:12 PM CDT -----  Pt came in Monday to have an EKG done for a surgery clearance at \[facility]\"" and they still do not have it. Please advise [phone]

## 2025-04-04 LAB
ALBUMIN SERPL-MCNC: 4.4 G/DL (ref 3.6–5.1)
ALBUMIN/CREAT UR: 8 MG/G CREAT
ALBUMIN/GLOB SERPL: 1.8 (CALC) (ref 1–2.5)
ALP SERPL-CCNC: 82 U/L (ref 37–153)
ALT SERPL-CCNC: 16 U/L (ref 6–29)
APPEARANCE UR: CLEAR
AST SERPL-CCNC: 18 U/L (ref 10–35)
BACTERIA #/AREA URNS HPF: NORMAL /HPF
BACTERIA UR CULT: NORMAL
BASOPHILS # BLD AUTO: 99 CELLS/UL (ref 0–200)
BASOPHILS NFR BLD AUTO: 1.6 %
BILIRUB SERPL-MCNC: 0.8 MG/DL (ref 0.2–1.2)
BILIRUB UR QL STRIP: NEGATIVE
BUN SERPL-MCNC: 22 MG/DL (ref 7–25)
BUN/CREAT SERPL: 39 (CALC) (ref 6–22)
CALCIUM SERPL-MCNC: 9.4 MG/DL (ref 8.6–10.4)
CHLORIDE SERPL-SCNC: 101 MMOL/L (ref 98–110)
CHOLEST SERPL-MCNC: 191 MG/DL
CHOLEST/HDLC SERPL: 2.4 (CALC)
CO2 SERPL-SCNC: 29 MMOL/L (ref 20–32)
COLOR UR: YELLOW
CREAT SERPL-MCNC: 0.56 MG/DL (ref 0.6–1)
CREAT UR-MCNC: 61 MG/DL (ref 20–275)
EGFR: 96 ML/MIN/1.73M2
EOSINOPHIL # BLD AUTO: 50 CELLS/UL (ref 15–500)
EOSINOPHIL NFR BLD AUTO: 0.8 %
ERYTHROCYTE [DISTWIDTH] IN BLOOD BY AUTOMATED COUNT: 12.1 % (ref 11–15)
GLOBULIN SER CALC-MCNC: 2.5 G/DL (CALC) (ref 1.9–3.7)
GLUCOSE SERPL-MCNC: 93 MG/DL (ref 65–99)
GLUCOSE UR QL STRIP: NEGATIVE
HBA1C MFR BLD: 5.5 % OF TOTAL HGB
HCT VFR BLD AUTO: 40.5 % (ref 35–45)
HDLC SERPL-MCNC: 79 MG/DL
HGB BLD-MCNC: 12.9 G/DL (ref 11.7–15.5)
HGB UR QL STRIP: NEGATIVE
HYALINE CASTS #/AREA URNS LPF: NORMAL /LPF
KETONES UR QL STRIP: NEGATIVE
LDLC SERPL CALC-MCNC: 94 MG/DL (CALC)
LEUKOCYTE ESTERASE UR QL STRIP: NEGATIVE
LPA SERPL-SCNC: 320 NMOL/L
LYMPHOCYTES # BLD AUTO: 1500 CELLS/UL (ref 850–3900)
LYMPHOCYTES NFR BLD AUTO: 24.2 %
MCH RBC QN AUTO: 31.2 PG (ref 27–33)
MCHC RBC AUTO-ENTMCNC: 31.9 G/DL (ref 32–36)
MCV RBC AUTO: 97.8 FL (ref 80–100)
MICROALBUMIN UR-MCNC: 0.5 MG/DL
MONOCYTES # BLD AUTO: 626 CELLS/UL (ref 200–950)
MONOCYTES NFR BLD AUTO: 10.1 %
NEUTROPHILS # BLD AUTO: 3925 CELLS/UL (ref 1500–7800)
NEUTROPHILS NFR BLD AUTO: 63.3 %
NITRITE UR QL STRIP: NEGATIVE
NONHDLC SERPL-MCNC: 112 MG/DL (CALC)
PH UR STRIP: 7 [PH] (ref 5–8)
PLATELET # BLD AUTO: 202 THOUSAND/UL (ref 140–400)
PMV BLD REES-ECKER: 12 FL (ref 7.5–12.5)
POTASSIUM SERPL-SCNC: 4 MMOL/L (ref 3.5–5.3)
PROT SERPL-MCNC: 6.9 G/DL (ref 6.1–8.1)
PROT UR QL STRIP: NEGATIVE
RBC # BLD AUTO: 4.14 MILLION/UL (ref 3.8–5.1)
RBC #/AREA URNS HPF: NORMAL /HPF
SERVICE CMNT-IMP: NORMAL
SODIUM SERPL-SCNC: 138 MMOL/L (ref 135–146)
SP GR UR STRIP: 1.02 (ref 1–1.03)
SQUAMOUS #/AREA URNS HPF: NORMAL /HPF
TRIGL SERPL-MCNC: 88 MG/DL
TSH SERPL-ACNC: 1.78 MIU/L (ref 0.4–4.5)
WBC # BLD AUTO: 6.2 THOUSAND/UL (ref 3.8–10.8)
WBC #/AREA URNS HPF: NORMAL /HPF

## 2025-04-08 ENCOUNTER — PATIENT MESSAGE (OUTPATIENT)
Dept: FAMILY MEDICINE | Facility: CLINIC | Age: 75
End: 2025-04-08
Payer: MEDICARE

## 2025-04-08 NOTE — TELEPHONE ENCOUNTER
Lpa is elevated. Cholesterol on 3/31. Follow heart health y diet and exercise. Refer to cardio. Going to hold off scheduling echo until she sees cardio. They may want to do stress test instead. Carlos or nadiya brown.

## 2025-04-09 ENCOUNTER — PATIENT MESSAGE (OUTPATIENT)
Dept: FAMILY MEDICINE | Facility: CLINIC | Age: 75
End: 2025-04-09
Payer: MEDICARE

## 2025-04-09 NOTE — PROGRESS NOTES
SUBJECTIVE:    Patient ID: Sylvia Maxwell is a 74 y.o. female.    Chief Complaint: Surgery Clearance (No bottles//Pt is here for surgery clearance//KE)      History of Present Illness    CHIEF COMPLAINT:  74 year old female presents today for pre-operative clearance for carpal tunnel release surgery and surgery    PRE-OPERATIVE STATUS:  She has carpal tunnel release surgery scheduled for April 9th at [facility]. She reports similar thumb symptoms developing in her other hand.    MUSCULOSKELETAL:  She has scoliotic curvature in two areas: T1 measuring 12.5 degrees and L1 to L4 measuring 14 degrees. She experiences right shoulder blade pain when lying supine for prolonged periods.    CARDIOLOGY:  Her previous cardiac care was provided by Dr. Crawford who performed echocardiograms until jail, followed by Dr. Marie who has since left the practice.      ROS:  General: -fever, -chills, -fatigue, -weight gain, -weight loss  Eyes: -vision changes, -redness, -discharge  ENT: -ear pain, -nasal congestion, -sore throat  Cardiovascular: -chest pain, -palpitations, -lower extremity edema  Respiratory: -cough, -shortness of breath  Gastrointestinal: -abdominal pain, -nausea, -vomiting, -diarrhea, -constipation, -blood in stool  Genitourinary: -dysuria, -hematuria, -frequency  Musculoskeletal: -joint pain, -muscle pain, +pain with movement, +limb pain  Skin: -rash, -lesion  Neurological: -headache, -dizziness, -numbness, -tingling  Psychiatric: -anxiety, -depression, -sleep difficulty              Office Visit on 03/31/2025   Component Date Value Ref Range Status    Lipoprotein (a) 03/31/2025 320 (H)  <75 nmol/L Final    WBC 03/31/2025 6.2  3.8 - 10.8 Thousand/uL Final    RBC 03/31/2025 4.14  3.80 - 5.10 Million/uL Final    Hemoglobin 03/31/2025 12.9  11.7 - 15.5 g/dL Final    Hematocrit 03/31/2025 40.5  35.0 - 45.0 % Final    MCV 03/31/2025 97.8  80.0 - 100.0 fL Final    MCH 03/31/2025 31.2  27.0 - 33.0 pg Final     MCHC 03/31/2025 31.9 (L)  32.0 - 36.0 g/dL Final    RDW 03/31/2025 12.1  11.0 - 15.0 % Final    Platelets 03/31/2025 202  140 - 400 Thousand/uL Final    MPV 03/31/2025 12.0  7.5 - 12.5 fL Final    Neutrophils, Abs 03/31/2025 3,925  1,500 - 7,800 cells/uL Final    Lymph # 03/31/2025 1,500  850 - 3,900 cells/uL Final    Mono # 03/31/2025 626  200 - 950 cells/uL Final    Eos # 03/31/2025 50  15 - 500 cells/uL Final    Baso # 03/31/2025 99  0 - 200 cells/uL Final    Neutrophils Relative 03/31/2025 63.3  % Final    Lymph % 03/31/2025 24.2  % Final    Mono % 03/31/2025 10.1  % Final    Eosinophil % 03/31/2025 0.8  % Final    Basophil % 03/31/2025 1.6  % Final    Glucose 03/31/2025 93  65 - 99 mg/dL Final    BUN 03/31/2025 22  7 - 25 mg/dL Final    Creatinine 03/31/2025 0.56 (L)  0.60 - 1.00 mg/dL Final    eGFR 03/31/2025 96  > OR = 60 mL/min/1.73m2 Final    BUN/Creatinine Ratio 03/31/2025 39 (H)  6 - 22 (calc) Final    Sodium 03/31/2025 138  135 - 146 mmol/L Final    Potassium 03/31/2025 4.0  3.5 - 5.3 mmol/L Final    Chloride 03/31/2025 101  98 - 110 mmol/L Final    CO2 03/31/2025 29  20 - 32 mmol/L Final    Calcium 03/31/2025 9.4  8.6 - 10.4 mg/dL Final    Total Protein 03/31/2025 6.9  6.1 - 8.1 g/dL Final    Albumin 03/31/2025 4.4  3.6 - 5.1 g/dL Final    Globulin, Total 03/31/2025 2.5  1.9 - 3.7 g/dL (calc) Final    Albumin/Globulin Ratio 03/31/2025 1.8  1.0 - 2.5 (calc) Final    Total Bilirubin 03/31/2025 0.8  0.2 - 1.2 mg/dL Final    Alkaline Phosphatase 03/31/2025 82  37 - 153 U/L Final    AST 03/31/2025 18  10 - 35 U/L Final    ALT 03/31/2025 16  6 - 29 U/L Final    Cholesterol 03/31/2025 191  <200 mg/dL Final    HDL 03/31/2025 79  > OR = 50 mg/dL Final    Triglycerides 03/31/2025 88  <150 mg/dL Final    LDL Cholesterol 03/31/2025 94  mg/dL (calc) Final    HDL/Cholesterol Ratio 03/31/2025 2.4  <5.0 (calc) Final    Non HDL Chol. (LDL+VLDL) 03/31/2025 112  <130 mg/dL (calc) Final    TSH w/reflex to FT4 03/31/2025  1.78  0.40 - 4.50 mIU/L Final    Creatinine, Urine 03/31/2025 61  20 - 275 mg/dL Final    Microalb, Ur 03/31/2025 0.5  See Note: mg/dL Final    Microalb/Creat Ratio 03/31/2025 8  <30 mg/g creat Final    Color, UA 03/31/2025 YELLOW  YELLOW Final    Appearance, UA 03/31/2025 CLEAR  CLEAR Final    Specific Gravity, UA 03/31/2025 1.018  1.001 - 1.035 Final    pH, UA 03/31/2025 7.0  5.0 - 8.0 Final    Glucose, UA 03/31/2025 NEGATIVE  NEGATIVE Final    Bilirubin, UA 03/31/2025 NEGATIVE  NEGATIVE Final    Ketones, UA 03/31/2025 NEGATIVE  NEGATIVE Final    Occult Blood UA 03/31/2025 NEGATIVE  NEGATIVE Final    Protein, UA 03/31/2025 NEGATIVE  NEGATIVE Final    Nitrite, UA 03/31/2025 NEGATIVE  NEGATIVE Final    Leukocytes, UA 03/31/2025 NEGATIVE  NEGATIVE Final    WBC Casts, UA 03/31/2025 NONE SEEN  < OR = 5 /HPF Final    RBC Casts, UA 03/31/2025 NONE SEEN  < OR = 2 /HPF Final    Squam Epithel, UA 03/31/2025 NONE SEEN  < OR = 5 /HPF Final    Bacteria, UA 03/31/2025 NONE SEEN  NONE SEEN /HPF Final    Hyaline Casts, UA 03/31/2025 NONE SEEN  NONE SEEN /LPF Final    Service Cmt: 03/31/2025 SEE COMMENT   Final    Reflexive Urine Culture 03/31/2025 SEE COMMENT   Final    Hemoglobin A1C 03/31/2025 5.5  <5.7 % of total Hgb Final   Patient Outreach on 11/19/2024   Component Date Value Ref Range Status    CRC Recommendation External 11/15/2024 No repeat colonoscopy   Final       History reviewed. No pertinent past medical history.  Past Surgical History:   Procedure Laterality Date    BREAST CYST ASPIRATION Left     CATARACT EXTRACTION Bilateral     Both done in 1/2024    COLONOSCOPY  11/15/2024    left wrist      arthritis    TUBAL LIGATION       Family History   Problem Relation Name Age of Onset    Eczema Neg Hx      Lupus Neg Hx      Psoriasis Neg Hx      Melanoma Neg Hx         All of your core healthy metrics are met.      The 10-year CVD risk score (DEONDRE'Agostino et al., 2008) is: 5.4%    Values used to calculate the score:       Age: 74 years      Sex: Female      Diabetic: No      Tobacco smoker: No      Systolic Blood Pressure: 112 mmHg      Is BP treated: No      HDL Cholesterol: 79 mg/dL      Total Cholesterol: 191 mg/dL     Marital Status:   Alcohol History:  reports current alcohol use.  Tobacco History:  reports that she has quit smoking. She has never used smokeless tobacco.  Drug History:  reports no history of drug use.    Health Maintenance Topics with due status: Not Due       Topic Last Completion Date    Colorectal Cancer Screening 11/15/2024    Mammogram 11/22/2024    DEXA Scan 11/22/2024    Lipid Panel 03/31/2025     Immunization History   Administered Date(s) Administered    COVID-19, MRNA, LN-S, PF (Pfizer) (Purple Cap) 02/09/2021, 03/02/2021, 11/08/2021    Influenza (FLUAD) - Quadrivalent - Adjuvanted - PF *Preferred* (65+) 09/20/2023    Influenza - Quadrivalent - High Dose - PF (65 years and older) 10/13/2020, 09/23/2021, 10/04/2022    Influenza - Trivalent - Fluad - Adjuvanted - PF (65 years and older 09/30/2024    Influenza - Trivalent - Fluzone High Dose - PF (65 years and older) 09/20/2017, 09/13/2018, 09/25/2019    Pneumococcal Conjugate - 13 Valent 01/12/2017    Pneumococcal Polysaccharide - 23 Valent 12/10/2019       Review of patient's allergies indicates:  No Known Allergies  Current Medications[1]        Objective:      Vitals:    03/31/25 0839   BP: 112/72   Pulse: 63   SpO2: 99%   Weight: 54.3 kg (119 lb 9.6 oz)   Height: 5' (1.524 m)       Physical Exam  Vitals and nursing note reviewed.   Constitutional:       Appearance: Normal appearance. She is well-developed and normal weight. She is not ill-appearing.      Comments: thin   HENT:      Right Ear: External ear normal.      Left Ear: External ear normal.   Neck:      Vascular: No JVD.   Cardiovascular:      Rate and Rhythm: Normal rate and regular rhythm.      Heart sounds: No murmur heard.  Pulmonary:      Effort: Pulmonary effort is normal.       Breath sounds: Normal breath sounds.   Abdominal:      General: Bowel sounds are normal.      Palpations: Abdomen is soft.   Musculoskeletal:      Left wrist: Deformity and tenderness present. Decreased range of motion.      Right hand: Deformity present. Normal strength.      Left hand: Deformity present. No bony tenderness. Normal strength.      Cervical back: Neck supple. Spasms and tenderness present. Decreased range of motion.      Lumbar back: Tenderness present. Decreased range of motion. Positive right straight leg raise test.      Comments: Arthritic nodes   Lymphadenopathy:      Cervical: No cervical adenopathy.   Skin:     General: Skin is warm and dry.      Findings: No rash.   Neurological:      Mental Status: She is alert and oriented to person, place, and time.      Gait: Gait normal.   Psychiatric:         Speech: Speech normal.         Behavior: Behavior normal.            Assessment:       1. Dyslipidemia    2. Major depressive disorder with single episode, in remission    3. Gastroesophageal reflux disease, unspecified whether esophagitis present    4. Elevated fasting blood sugar    5. High risk medication use    6. Levoscoliosis    7. Arthralgia, unspecified joint    8. Heberden nodes of both hands    9. Carpal tunnel syndrome, unspecified laterality    10. Pre-operative clearance           Assessment & Plan    - Reviewed upcoming carpal tunnel release surgery  at [facility].  - Discussed scoliosis XR results showing curvature from T1 to T12 (12.5 degrees) and L1 to L4 (14 degrees).  - discussed physical therapy for scoliosis management to prevent further progression and strengthen muscles, rather than correcting existing curvature.  - Assessed previous autoimmune test results, which were normal.  - Reviewed cardiac care history and initiated referral to new cardiologist.  - Discussed LPA (lipoprotein) testing as a more in-depth assessment of cholesterol particle sizes for cardiac risk  evaluation.    CARPAL TUNNEL SYNDROME, RIGHT UPPER LIMB:  - Scheduled the patient for carpal tunnel release surgery on April 9th at [facility]   - Noted the patient's report of thumb issue in the right hand.  - Planned physical therapy after hand surgery.    CARPAL TUNNEL SYNDROME, LEFT UPPER LIMB:  - Noted the patient's report of similar thumb issue starting in the left hand.  -     THORACIC IDIOPATHIC SCOLIOSIS:  - Ordered annual scoliosis radiograph for monitoring.  - X-ray showed curvature from T1 to T12, measuring 12.5 degrees.  - Noted patient's report of right scapular pain when supine for extended periods.  - Recommend physical therapy to strengthen muscles and prevent further progression.    THORACOLUMBAR IDIOPATHIC SCOLIOSIS:  - Ordered annual scoliosis radiograph for monitoring.  - X-ray showed curvature from L1 to L4, measuring 14 degrees.  - Recommend physical therapy to strengthen muscles and prevent further progression.    OTHER MEDICAL MANAGEMENT:  - Sylvia to call cardiology office to schedule appointment,   - Ordered autoimmune panel, A1C, and cardiac profile (LPA) for cardiac risk assessment.  - Referred to Dr. Flowers for cardiology follow-up.  - Contact the office through the portal regarding medication refills.        Plan:       1. Dyslipidemia  Comments:  labs  Orders:  -     Lipoprotein A (LPA); Future; Expected date: 03/31/2025  -     CBC Auto Differential; Future; Expected date: 03/31/2025  -     Comprehensive Metabolic Panel; Future; Expected date: 03/31/2025  -     Lipid Panel; Future; Expected date: 03/31/2025  -     TSH w/reflex to FT4; Future; Expected date: 03/31/2025  -     Microalbumin/Creatinine Ratio, Urine; Future; Expected date: 03/31/2025  -     Urinalysis, Reflex to Urine Culture; Future; Expected date: 03/31/2025  -     Hemoglobin A1C; Future; Expected date: 03/31/2025    2. Major depressive disorder with single episode, in remission  Comments:  manageable with current  meds  Orders:  -     Lipoprotein A (LPA); Future; Expected date: 03/31/2025  -     CBC Auto Differential; Future; Expected date: 03/31/2025  -     Comprehensive Metabolic Panel; Future; Expected date: 03/31/2025  -     Lipid Panel; Future; Expected date: 03/31/2025  -     TSH w/reflex to FT4; Future; Expected date: 03/31/2025  -     Microalbumin/Creatinine Ratio, Urine; Future; Expected date: 03/31/2025  -     Urinalysis, Reflex to Urine Culture; Future; Expected date: 03/31/2025  -     Hemoglobin A1C; Future; Expected date: 03/31/2025    3. Gastroesophageal reflux disease, unspecified whether esophagitis present  Comments:  protonix  Orders:  -     Lipoprotein A (LPA); Future; Expected date: 03/31/2025  -     CBC Auto Differential; Future; Expected date: 03/31/2025  -     Comprehensive Metabolic Panel; Future; Expected date: 03/31/2025  -     Lipid Panel; Future; Expected date: 03/31/2025  -     TSH w/reflex to FT4; Future; Expected date: 03/31/2025  -     Microalbumin/Creatinine Ratio, Urine; Future; Expected date: 03/31/2025  -     Urinalysis, Reflex to Urine Culture; Future; Expected date: 03/31/2025  -     Hemoglobin A1C; Future; Expected date: 03/31/2025    4. Elevated fasting blood sugar  -     Lipoprotein A (LPA); Future; Expected date: 03/31/2025  -     CBC Auto Differential; Future; Expected date: 03/31/2025  -     Comprehensive Metabolic Panel; Future; Expected date: 03/31/2025  -     Lipid Panel; Future; Expected date: 03/31/2025  -     TSH w/reflex to FT4; Future; Expected date: 03/31/2025  -     Microalbumin/Creatinine Ratio, Urine; Future; Expected date: 03/31/2025  -     Urinalysis, Reflex to Urine Culture; Future; Expected date: 03/31/2025  -     Hemoglobin A1C; Future; Expected date: 03/31/2025    5. High risk medication use  -     Lipoprotein A (LPA); Future; Expected date: 03/31/2025  -     CBC Auto Differential; Future; Expected date: 03/31/2025  -     Comprehensive Metabolic Panel; Future;  Expected date: 03/31/2025  -     Lipid Panel; Future; Expected date: 03/31/2025  -     TSH w/reflex to FT4; Future; Expected date: 03/31/2025  -     Microalbumin/Creatinine Ratio, Urine; Future; Expected date: 03/31/2025  -     Urinalysis, Reflex to Urine Culture; Future; Expected date: 03/31/2025  -     Hemoglobin A1C; Future; Expected date: 03/31/2025    6. Levoscoliosis  Comments:  home pt exercises    7. Arthralgia, unspecified joint    8. Heberden nodes of both hands    9. Carpal tunnel syndrome, unspecified laterality  Comments:  scheduled for release    10. Pre-operative clearance  Comments:  medically cleared      Follow up in about 3 months (around 6/30/2025), or if symptoms worsen or fail to improve, for medication management.          Counseled on age and gender appropriate medical preventative services, including cancer screenings, immunizations, overall nutritional health, need for a consistent exercise regimen and an overall push towards maintaining a vigorous and active lifestyle.      This note was generated with the assistance of ambient listening technology. Verbal consent was obtained by the patient and accompanying visitor(s) for the recording of patient appointment to facilitate this note. I attest to having reviewed and edited the generated note for accuracy, though some syntax or spelling errors may persist. Please contact the author of this note for any clarification.       4/9/2025 Bonnie Arita NP    Answers submitted by the patient for this visit:  Review of Systems Questionnaire (Submitted on 3/27/2025)  activity change: Yes  hearing loss: Yes  trouble swallowing: No  eye discharge: No  chest tightness: No  wheezing: No  chest pain: No  palpitations: No  constipation: No  vomiting: No  diarrhea: No  difficulty urinating: No  hematuria: No  headaches: No  dysphoric mood: No         [1]   Current Outpatient Medications:     ascorbate calcium, vitamin C, 500 mg Tab, Vitamin C 500 mg tablet   Take 1 tablet every day by oral route., Disp: , Rfl:     calcium-vitamin D3 (OS-FERNANDA 500 + D3) 500 mg-5 mcg (200 unit) per tablet, Take 1 tablet by mouth 2 (two) times daily with meals., Disp: , Rfl:     celecoxib (CELEBREX) 200 MG capsule, Take 1 capsule (200 mg total) by mouth 2 (two) times daily., Disp: 60 capsule, Rfl: 1    Lactobacillus acidophilus (PROBIOTIC) 10 billion cell Cap, , Disp: , Rfl:     magnesium 200 mg Tab, magnesium, Disp: , Rfl:     multivit-min-iron-FA-K.gin 18 mg iron-400 mcg-50 mg Tab, Take 1 tablet by mouth once daily. , Disp: , Rfl:     pantoprazole (PROTONIX) 40 MG tablet, Take 1 tablet (40 mg total) by mouth once daily., Disp: 90 tablet, Rfl: 1    rosuvastatin (CRESTOR) 5 MG tablet, Take 1 tablet (5 mg total) by mouth once daily., Disp: 90 tablet, Rfl: 1    sertraline (ZOLOFT) 50 MG tablet, Take 1 tablet (50 mg total) by mouth once daily., Disp: 90 tablet, Rfl: 0

## 2025-04-10 NOTE — TELEPHONE ENCOUNTER
This is not an acute lab. Needs to start low cholesterol diet. I can order a stress test as an outpatient?

## 2025-04-10 NOTE — TELEPHONE ENCOUNTER
Meaning that this is not an acute problem. Ex checking cholesterol , etc  Does she want me to order stress test while she is waiting to be seen

## 2025-05-13 ENCOUNTER — TELEPHONE (OUTPATIENT)
Dept: CARDIOLOGY | Facility: CLINIC | Age: 75
End: 2025-05-13
Payer: MEDICARE

## 2025-05-13 NOTE — TELEPHONE ENCOUNTER
----- Message from Kiah sent at 5/13/2025 11:17 AM CDT -----  Regarding: NP Sooner Appointment Request  Type:  NP Sooner Appointment RequestCaller is requesting a sooner appointment.  Name of Caller:  patient at [phone] is the first available appointment?  July 14Symptoms:  establish careAdditional Information:  Please call and advise. Pt would like to be seen beginning to mid June if possible. Pt has a an appt on 7/14 and is on the wait list. Thank you.

## 2025-05-20 ENCOUNTER — OFFICE VISIT (OUTPATIENT)
Dept: FAMILY MEDICINE | Facility: CLINIC | Age: 75
End: 2025-05-20
Payer: MEDICARE

## 2025-05-20 VITALS
BODY MASS INDEX: 26.19 KG/M2 | TEMPERATURE: 98 F | HEIGHT: 55 IN | HEART RATE: 77 BPM | DIASTOLIC BLOOD PRESSURE: 64 MMHG | WEIGHT: 113.19 LBS | SYSTOLIC BLOOD PRESSURE: 116 MMHG

## 2025-05-20 DIAGNOSIS — R09.82 POST-NASAL DRIP: Primary | ICD-10-CM

## 2025-05-20 DIAGNOSIS — J01.00 ACUTE MAXILLARY SINUSITIS, RECURRENCE NOT SPECIFIED: ICD-10-CM

## 2025-05-20 PROCEDURE — 1125F AMNT PAIN NOTED PAIN PRSNT: CPT | Mod: CPTII,S$GLB,,

## 2025-05-20 PROCEDURE — 3288F FALL RISK ASSESSMENT DOCD: CPT | Mod: CPTII,S$GLB,,

## 2025-05-20 PROCEDURE — 99213 OFFICE O/P EST LOW 20 MIN: CPT | Mod: 25,S$GLB,,

## 2025-05-20 PROCEDURE — 3078F DIAST BP <80 MM HG: CPT | Mod: CPTII,S$GLB,,

## 2025-05-20 PROCEDURE — 3074F SYST BP LT 130 MM HG: CPT | Mod: CPTII,S$GLB,,

## 2025-05-20 PROCEDURE — 1159F MED LIST DOCD IN RCRD: CPT | Mod: CPTII,S$GLB,,

## 2025-05-20 PROCEDURE — 96372 THER/PROPH/DIAG INJ SC/IM: CPT | Mod: S$GLB,,,

## 2025-05-20 PROCEDURE — 3044F HG A1C LEVEL LT 7.0%: CPT | Mod: CPTII,S$GLB,,

## 2025-05-20 PROCEDURE — 1160F RVW MEDS BY RX/DR IN RCRD: CPT | Mod: CPTII,S$GLB,,

## 2025-05-20 PROCEDURE — 1101F PT FALLS ASSESS-DOCD LE1/YR: CPT | Mod: CPTII,S$GLB,,

## 2025-05-20 PROCEDURE — 3008F BODY MASS INDEX DOCD: CPT | Mod: CPTII,S$GLB,,

## 2025-05-20 RX ORDER — FLUTICASONE PROPIONATE 50 MCG
1 SPRAY, SUSPENSION (ML) NASAL DAILY
Qty: 16 G | Refills: 2 | Status: SHIPPED | OUTPATIENT
Start: 2025-05-20

## 2025-05-20 RX ORDER — DEXAMETHASONE SODIUM PHOSPHATE 4 MG/ML
8 INJECTION, SOLUTION INTRA-ARTICULAR; INTRALESIONAL; INTRAMUSCULAR; INTRAVENOUS; SOFT TISSUE
Status: COMPLETED | OUTPATIENT
Start: 2025-05-20 | End: 2025-05-20

## 2025-05-20 RX ADMIN — DEXAMETHASONE SODIUM PHOSPHATE 8 MG: 4 INJECTION, SOLUTION INTRA-ARTICULAR; INTRALESIONAL; INTRAMUSCULAR; INTRAVENOUS; SOFT TISSUE at 04:05

## 2025-05-20 NOTE — PROGRESS NOTES
SUBJECTIVE:    Patient ID: Sylvia Maxwell is a 74 y.o. female.    Chief Complaint: Sore Throat (Cough, nasal drip, headache, ear congestion; no med bottles)    Sore Throat   This is a new problem. The current episode started yesterday. The problem has been waxing and waning. The pain is worse on the left side. There has been no fever. The pain is at a severity of 7/10. The pain is moderate. Associated symptoms include coughing, headaches and a plugged ear sensation. She has tried acetaminophen for the symptoms. The treatment provided no relief.     History of Present Illness    CHIEF COMPLAINT:  Sylvia presents today for sinus symptoms.    HISTORY OF PRESENT ILLNESS:  She developed sinus symptoms on Sunday that worsened on Monday. She reports facial pressure, headache, and sore throat that worsens with swallowing. She experiences hoarseness, especially after prolonged talking, and post-nasal drip with associated throat cough. She also notes intermittent tooth pain. She denies fever, chills, significant cough, nausea, or diarrhea. Recent exposure includes plane travel and contact with three babies, two of whom had runny noses. While visiting family over the weekend, she slept with a fan on while staying with her granddaughter and great-granddaughter.    MEDICATIONS:  She reports prior use of Flonase.    IMMUNIZATIONS:  She has received influenza vaccination this season.      ROS:  General: -fever, -chills, -fatigue, -weight gain, -weight loss  Eyes: -vision changes, -redness, -discharge  ENT: -ear pain, -nasal congestion, +sore throat, +hoarseness, +post nasal drip  Cardiovascular: -chest pain, -palpitations, -lower extremity edema  Respiratory: +cough, -shortness of breath  Gastrointestinal: -abdominal pain, -nausea, -vomiting, -diarrhea, -constipation, -blood in stool  Genitourinary: -dysuria, -hematuria, -frequency  Musculoskeletal: -joint pain, -muscle pain  Skin: -rash, -lesion  Neurological: +headache,  -dizziness, -numbness, -tingling  Psychiatric: -anxiety, -depression, -sleep difficulty  Head: +sinus pressure, +head pain, +tooth pain         Office Visit on 03/31/2025   Component Date Value Ref Range Status    Lipoprotein (a) 03/31/2025 320 (H)  <75 nmol/L Final    WBC 03/31/2025 6.2  3.8 - 10.8 Thousand/uL Final    RBC 03/31/2025 4.14  3.80 - 5.10 Million/uL Final    Hemoglobin 03/31/2025 12.9  11.7 - 15.5 g/dL Final    Hematocrit 03/31/2025 40.5  35.0 - 45.0 % Final    MCV 03/31/2025 97.8  80.0 - 100.0 fL Final    MCH 03/31/2025 31.2  27.0 - 33.0 pg Final    MCHC 03/31/2025 31.9 (L)  32.0 - 36.0 g/dL Final    RDW 03/31/2025 12.1  11.0 - 15.0 % Final    Platelets 03/31/2025 202  140 - 400 Thousand/uL Final    MPV 03/31/2025 12.0  7.5 - 12.5 fL Final    Neutrophils, Abs 03/31/2025 3,925  1,500 - 7,800 cells/uL Final    Lymph # 03/31/2025 1,500  850 - 3,900 cells/uL Final    Mono # 03/31/2025 626  200 - 950 cells/uL Final    Eos # 03/31/2025 50  15 - 500 cells/uL Final    Baso # 03/31/2025 99  0 - 200 cells/uL Final    Neutrophils Relative 03/31/2025 63.3  % Final    Lymph % 03/31/2025 24.2  % Final    Mono % 03/31/2025 10.1  % Final    Eosinophil % 03/31/2025 0.8  % Final    Basophil % 03/31/2025 1.6  % Final    Glucose 03/31/2025 93  65 - 99 mg/dL Final    BUN 03/31/2025 22  7 - 25 mg/dL Final    Creatinine 03/31/2025 0.56 (L)  0.60 - 1.00 mg/dL Final    eGFR 03/31/2025 96  > OR = 60 mL/min/1.73m2 Final    BUN/Creatinine Ratio 03/31/2025 39 (H)  6 - 22 (calc) Final    Sodium 03/31/2025 138  135 - 146 mmol/L Final    Potassium 03/31/2025 4.0  3.5 - 5.3 mmol/L Final    Chloride 03/31/2025 101  98 - 110 mmol/L Final    CO2 03/31/2025 29  20 - 32 mmol/L Final    Calcium 03/31/2025 9.4  8.6 - 10.4 mg/dL Final    Total Protein 03/31/2025 6.9  6.1 - 8.1 g/dL Final    Albumin 03/31/2025 4.4  3.6 - 5.1 g/dL Final    Globulin, Total 03/31/2025 2.5  1.9 - 3.7 g/dL (calc) Final    Albumin/Globulin Ratio 03/31/2025 1.8  1.0  - 2.5 (calc) Final    Total Bilirubin 03/31/2025 0.8  0.2 - 1.2 mg/dL Final    Alkaline Phosphatase 03/31/2025 82  37 - 153 U/L Final    AST 03/31/2025 18  10 - 35 U/L Final    ALT 03/31/2025 16  6 - 29 U/L Final    Cholesterol 03/31/2025 191  <200 mg/dL Final    HDL 03/31/2025 79  > OR = 50 mg/dL Final    Triglycerides 03/31/2025 88  <150 mg/dL Final    LDL Cholesterol 03/31/2025 94  mg/dL (calc) Final    HDL/Cholesterol Ratio 03/31/2025 2.4  <5.0 (calc) Final    Non HDL Chol. (LDL+VLDL) 03/31/2025 112  <130 mg/dL (calc) Final    TSH w/reflex to FT4 03/31/2025 1.78  0.40 - 4.50 mIU/L Final    Creatinine, Urine 03/31/2025 61  20 - 275 mg/dL Final    Microalb, Ur 03/31/2025 0.5  See Note: mg/dL Final    Microalb/Creat Ratio 03/31/2025 8  <30 mg/g creat Final    Color, UA 03/31/2025 YELLOW  YELLOW Final    Appearance, UA 03/31/2025 CLEAR  CLEAR Final    Specific Gravity, UA 03/31/2025 1.018  1.001 - 1.035 Final    pH, UA 03/31/2025 7.0  5.0 - 8.0 Final    Glucose, UA 03/31/2025 NEGATIVE  NEGATIVE Final    Bilirubin, UA 03/31/2025 NEGATIVE  NEGATIVE Final    Ketones, UA 03/31/2025 NEGATIVE  NEGATIVE Final    Occult Blood UA 03/31/2025 NEGATIVE  NEGATIVE Final    Protein, UA 03/31/2025 NEGATIVE  NEGATIVE Final    Nitrite, UA 03/31/2025 NEGATIVE  NEGATIVE Final    Leukocytes, UA 03/31/2025 NEGATIVE  NEGATIVE Final    WBC Casts, UA 03/31/2025 NONE SEEN  < OR = 5 /HPF Final    RBC Casts, UA 03/31/2025 NONE SEEN  < OR = 2 /HPF Final    Squam Epithel, UA 03/31/2025 NONE SEEN  < OR = 5 /HPF Final    Bacteria, UA 03/31/2025 NONE SEEN  NONE SEEN /HPF Final    Hyaline Casts, UA 03/31/2025 NONE SEEN  NONE SEEN /LPF Final    Service Cmt: 03/31/2025 SEE COMMENT   Final    Reflexive Urine Culture 03/31/2025 SEE COMMENT   Final    Hemoglobin A1C 03/31/2025 5.5  <5.7 % of total Hgb Final       No past medical history on file.  Social History[1]  Past Surgical History:   Procedure Laterality Date    BREAST CYST ASPIRATION Left      "CATARACT EXTRACTION Bilateral     Both done in 1/2024    COLONOSCOPY  11/15/2024    left wrist      arthritis    TUBAL LIGATION       Family History   Problem Relation Name Age of Onset    Eczema Neg Hx      Lupus Neg Hx      Psoriasis Neg Hx      Melanoma Neg Hx         The 10-year CVD risk score (Jose F et al., 2008) is: 6%    Values used to calculate the score:      Age: 74 years      Sex: Female      Diabetic: No      Tobacco smoker: No      Systolic Blood Pressure: 116 mmHg      Is BP treated: No      HDL Cholesterol: 79 mg/dL      Total Cholesterol: 191 mg/dL    All of your core healthy metrics are met.      Review of patient's allergies indicates:  No Known Allergies  Current Medications[2]    Review of Systems   HENT:  Positive for sore throat.    Respiratory:  Positive for cough.    Neurological:  Positive for headaches.           Objective:      Vitals:    05/20/25 1548   BP: 116/64   Pulse: 77   Temp: 98.2 °F (36.8 °C)   TempSrc: Oral   Weight: 51.3 kg (113 lb 3.2 oz)   Height: 4' 7" (1.397 m)     Physical Exam  Constitutional:       Appearance: Normal appearance.   HENT:      Head: Normocephalic and atraumatic.      Right Ear: Tympanic membrane is bulging.      Left Ear: Tympanic membrane is bulging.      Nose:      Right Turbinates: Enlarged.      Left Turbinates: Enlarged.      Right Sinus: Maxillary sinus tenderness present.      Mouth/Throat:      Pharynx: Posterior oropharyngeal erythema and postnasal drip present.   Eyes:      General: No scleral icterus.  Cardiovascular:      Rate and Rhythm: Normal rate and regular rhythm.   Pulmonary:      Effort: Pulmonary effort is normal.      Breath sounds: Normal breath sounds.   Skin:     General: Skin is warm and dry.   Neurological:      General: No focal deficit present.      Mental Status: She is alert.                 Assessment:       1. Post-nasal drip    2. Acute maxillary sinusitis, recurrence not specified         Plan:       Post-nasal " drip  -     fluticasone propionate (FLONASE) 50 mcg/actuation nasal spray; 1 spray (50 mcg total) by Each Nostril route once daily.  Dispense: 16 g; Refill: 2  -     POCT COVID-19 Rapid Screening  -     POCT Influenza A/B Molecular  -     POCT Strep A, Molecular    Acute maxillary sinusitis, recurrence not specified  -     dexAMETHasone injection 8 mg  -     POCT COVID-19 Rapid Screening  -     POCT Influenza A/B Molecular  -     POCT Strep A, Molecular        ## ACUTE SINUSITIS:  - Assessed patient's symptoms as likely sinus-related with post-nasal drip.  - Observed swollen and red nasal passages with excessive mucus production.  - Sylvia reports pressure in the face, headache, sore throat, and pain radiating to teeth.  - Started Flonase nasal spray for reducing mucus production and alleviating symptoms, explaining its gradual effect rather than immediate decongestant action.  - Offered steroid treatment options: a) Steroid injection for immediate relief (with caution about potential sleep disturbance) or b) Prednisone 20 mg daily for a few days, to be started in the morning.  - Discussed potential side effects of steroid treatment, including facial flushing, increased energy, and hunger.  - Sylvia to contact office if mucus production becomes yellow, orange, or brown for potential antibiotic prescription, which will be sent based on patient's update.    ## NONSUPPURATIVE OTITIS MEDIA:  - Observed bulging eardrums with fluid accumulation.  - Tympanic membrane is not severely erythematous but has significant secretions, suggesting nonsuppurative otitis media.    ## ACUTE PHARYNGITIS:  - Observed signs of post-nasal drip in throat with raw appearance and grooves from mucus drainage.  - Sylvia reports sore throat that worsens with swallowing.  - Ruled out lower respiratory involvement based on clear lung sounds.    ## CHRONIC NASOPHARYNGITIS:  - Observed swollen and erythematous nasal linings indicating chronic  irritation.  - Sylvia reports feeling like having consistent sinus problems.  - Flonase nasal spray recommended to manage chronic symptoms.      ## POTENTIAL VIRAL EXPOSURE:  - Considered viral etiology, possibly from exposure to sick children.  - Sylvia was out of town and slept in proximity to granddaughter and great-granddaughter with fan exposure, possibly introducing different dust and allergens.  - Documented potential exposure on airplane as possible source of COVID or other viruses.  - COVID-19 and influenza deemed unlikely due to absence of fever, chills, significant cough, and other typical symptoms. All testing done today was negative.    ## GENERAL RECOMMENDATIONS:  - Recommend OTC medications for symptom relief (specific medications to be provided on patient papers).          Follow up if symptoms worsen or fail to improve.        This note was generated with the assistance of ambient listening technology. Verbal consent was obtained by the patient and accompanying visitor(s) for the recording of patient appointment to facilitate this note. I attest to having reviewed and edited the generated note for accuracy, though some syntax or spelling errors may persist. Please contact the author of this note for any clarification.      5/20/2025 Adry Stanley         [1]   Social History  Socioeconomic History    Marital status:    Tobacco Use    Smoking status: Former    Smokeless tobacco: Never   Substance and Sexual Activity    Alcohol use: Yes     Comment: ocassionally    Drug use: Never    Sexual activity: Not Currently     Social Drivers of Health     Financial Resource Strain: Patient Declined (3/27/2025)    Overall Financial Resource Strain (CARDIA)     Difficulty of Paying Living Expenses: Patient declined   Food Insecurity: Patient Declined (3/27/2025)    Hunger Vital Sign     Worried About Running Out of Food in the Last Year: Patient declined     Ran Out of Food in the Last Year: Patient  declined   Transportation Needs: Patient Declined (3/27/2025)    PRAPARE - Transportation     Lack of Transportation (Medical): Patient declined     Lack of Transportation (Non-Medical): Patient declined   Physical Activity: Inactive (3/27/2025)    Exercise Vital Sign     Days of Exercise per Week: 0 days     Minutes of Exercise per Session: 0 min   Stress: No Stress Concern Present (3/27/2025)    [employer] - Occupational Stress Questionnaire     Feeling of Stress : Only a little   Housing Stability: Patient Declined (3/27/2025)    Housing Stability Vital Sign     Unable to Pay for Housing in the Last Year: Patient declined     Homeless in the Last Year: Patient declined   [2]   Current Outpatient Medications:     ascorbate calcium, vitamin C, 500 mg Tab, Vitamin C 500 mg tablet  Take 1 tablet every day by oral route., Disp: , Rfl:     calcium-vitamin D3 (OS-FERNANDA 500 + D3) 500 mg-5 mcg (200 unit) per tablet, Take 1 tablet by mouth 2 (two) times daily with meals., Disp: , Rfl:     Lactobacillus acidophilus (PROBIOTIC) 10 billion cell Cap, , Disp: , Rfl:     magnesium 200 mg Tab, magnesium, Disp: , Rfl:     multivit-min-iron-FA-K.gin 18 mg iron-400 mcg-50 mg Tab, Take 1 tablet by mouth once daily. , Disp: , Rfl:     pantoprazole (PROTONIX) 40 MG tablet, Take 1 tablet (40 mg total) by mouth once daily., Disp: 90 tablet, Rfl: 1    rosuvastatin (CRESTOR) 5 MG tablet, Take 1 tablet (5 mg total) by mouth once daily., Disp: 90 tablet, Rfl: 1    sertraline (ZOLOFT) 50 MG tablet, Take 1 tablet (50 mg total) by mouth once daily., Disp: 90 tablet, Rfl: 0    celecoxib (CELEBREX) 200 MG capsule, Take 1 capsule (200 mg total) by mouth 2 (two) times daily. (Patient not taking: Reported on 5/20/2025), Disp: 60 capsule, Rfl: 1    fluticasone propionate (FLONASE) 50 mcg/actuation nasal spray, 1 spray (50 mcg total) by Each Nostril route once daily., Disp: 16 g, Rfl: 2  No current facility-administered  medications for this visit.

## 2025-05-22 ENCOUNTER — PATIENT MESSAGE (OUTPATIENT)
Dept: FAMILY MEDICINE | Facility: CLINIC | Age: 75
End: 2025-05-22
Payer: MEDICARE

## 2025-05-22 LAB
CTP QC/QA: YES
MOLECULAR STREP A: NEGATIVE
POC MOLECULAR INFLUENZA A AGN: NEGATIVE
POC MOLECULAR INFLUENZA B AGN: NEGATIVE
SARS-COV-2 RDRP RESP QL NAA+PROBE: NEGATIVE

## 2025-06-25 ENCOUNTER — TELEPHONE (OUTPATIENT)
Dept: CARDIOLOGY | Facility: CLINIC | Age: 75
End: 2025-06-25
Payer: MEDICARE

## 2025-06-25 NOTE — TELEPHONE ENCOUNTER
Copied from CRM #[id number]. Topic: Appointments - Appointment Scheduling  >> Jun 25, 2025  1:56 PM Judith wrote:  Pt st she was homer 7/14 & was informed by [facility] her appt was can & is now 7/25/ pt st she was not informed & didnt want this appt. pt st she wants to be seen sooner. she needs to get in to see dr. tilley she sts. please call to discuss

## 2025-07-14 ENCOUNTER — OFFICE VISIT (OUTPATIENT)
Dept: CARDIOLOGY | Facility: CLINIC | Age: 75
End: 2025-07-14
Payer: MEDICARE

## 2025-07-14 VITALS
WEIGHT: 110.44 LBS | HEART RATE: 68 BPM | HEIGHT: 55 IN | BODY MASS INDEX: 25.56 KG/M2 | DIASTOLIC BLOOD PRESSURE: 64 MMHG | OXYGEN SATURATION: 99 % | SYSTOLIC BLOOD PRESSURE: 118 MMHG

## 2025-07-14 DIAGNOSIS — E78.2 MIXED HYPERLIPIDEMIA: ICD-10-CM

## 2025-07-14 DIAGNOSIS — I34.1 MITRAL VALVE PROLAPSE: Primary | ICD-10-CM

## 2025-07-14 DIAGNOSIS — I34.0 MODERATE MITRAL REGURGITATION: ICD-10-CM

## 2025-07-14 PROCEDURE — 1159F MED LIST DOCD IN RCRD: CPT | Mod: CPTII,S$GLB,, | Performed by: STUDENT IN AN ORGANIZED HEALTH CARE EDUCATION/TRAINING PROGRAM

## 2025-07-14 PROCEDURE — 99214 OFFICE O/P EST MOD 30 MIN: CPT | Mod: S$GLB,,, | Performed by: STUDENT IN AN ORGANIZED HEALTH CARE EDUCATION/TRAINING PROGRAM

## 2025-07-14 PROCEDURE — 1160F RVW MEDS BY RX/DR IN RCRD: CPT | Mod: CPTII,S$GLB,, | Performed by: STUDENT IN AN ORGANIZED HEALTH CARE EDUCATION/TRAINING PROGRAM

## 2025-07-14 PROCEDURE — 3078F DIAST BP <80 MM HG: CPT | Mod: CPTII,S$GLB,, | Performed by: STUDENT IN AN ORGANIZED HEALTH CARE EDUCATION/TRAINING PROGRAM

## 2025-07-14 PROCEDURE — 3074F SYST BP LT 130 MM HG: CPT | Mod: CPTII,S$GLB,, | Performed by: STUDENT IN AN ORGANIZED HEALTH CARE EDUCATION/TRAINING PROGRAM

## 2025-07-14 PROCEDURE — 99999 PR PBB SHADOW E&M-EST. PATIENT-LVL IV: CPT | Mod: PBBFAC,,, | Performed by: STUDENT IN AN ORGANIZED HEALTH CARE EDUCATION/TRAINING PROGRAM

## 2025-07-14 PROCEDURE — 3044F HG A1C LEVEL LT 7.0%: CPT | Mod: CPTII,S$GLB,, | Performed by: STUDENT IN AN ORGANIZED HEALTH CARE EDUCATION/TRAINING PROGRAM

## 2025-07-14 PROCEDURE — 3008F BODY MASS INDEX DOCD: CPT | Mod: CPTII,S$GLB,, | Performed by: STUDENT IN AN ORGANIZED HEALTH CARE EDUCATION/TRAINING PROGRAM

## 2025-07-14 NOTE — PROGRESS NOTES
Dictation #1  MRN:[id number]  CSN:[id number] Part of this note has been created using [employer] dictation system. Errors in transcription may not be completely avoided. Some syntax or spelling errors may persist. Please contact the author of this note for any clarification.    Patient ID:  Sylvia Maxwell  74 y.o.  female      Assessment:     1. Mitral valve prolapse    2. Mixed hyperlipidemia    3. Moderate mitral regurgitation        Plan:     Asymptomatic from cardiac standpoint.  Discussed ASCVD primary prevention, risk factor control and lifestyle interventions including diet and exercise.  Discussed the significance of elevated LP(a).  It is inherited and a marker of increased ASCVD risk.  It is largely un modifiable with current available therapy.  There are clinical trials in the works.  Hence, it is recommended to aggressively lower other modifiable risk factors to reduce overall ASCVD risk.  Recommend aiming for goal LDL<55, at least <70 from prevention standpoint..  Continue Crestor.  Discuss adding another apartment therapy.  She would like to modify her diet 1st before doing that.  Work on dietary changes and recheck lipid panel in 6 months.  Repeat echocardiogram to evaluate mitral valve disease.      Subjective:     Chief Complaint   Patient presents with    Mitral Valve Prolapse       HPI:  Sylvia Maxwell is a 74 y.o. female who presents today for follow up.  She has seen Dr. Aguillon, Dr. Damon Amin and Dr. Milton Torres in the past.  She has a history of mitral valve prolapse, moderate mitral regurgitation on last echo in 2023 and hyperlipidemia.  She has high LP(a); had it checked recently.    She reports doing well overall.  Has brief episodes of fluttering sensation lasting seconds on rare occasions.  She has had it for many years.  Does not bother her much.  Denies any dizziness, syncope or near-syncope.  Denies any chest pain, shortness of breath, orthopnea, PND or edema.  She does not exercise  but she stays busy throughout the day.  Denies any exertional symptoms.    EKG today is normal.    PREVIOUS CARDIAC TESTING/PROCEDURES HISTORY:  Most Recent Echocardiogram Results  Results for orders placed during the hospital encounter of 01/04/23    Echo    Interpretation Summary  · The left ventricle is normal in size with normal systolic function.  · The estimated ejection fraction is 65%.  · Normal left ventricular diastolic function.  · Normal right ventricular size.  · Moderate mitral regurgitation.  · There is mild mitral prolapse of the anterior mitral leaflet.        Review of patient's allergies indicates:  No Known Allergies    No past medical history on file.  Past Surgical History:   Procedure Laterality Date    BREAST CYST ASPIRATION Left     CATARACT EXTRACTION Bilateral     Both done in 1/2024    COLONOSCOPY  11/15/2024    left wrist      arthritis    TUBAL LIGATION       Social History[1]       REVIEW OF SYSTEMS  CONSTITUTIONAL: No chills.   EYES: No double vision  NEURO: No alteration in mental status  RESPIRATORY: No wheezing.    CARDIOVASCULAR: See HPI   GI: No melena/hematochezia/hematemesis, no diarrhea, no nausea or vomiting.   : No dysuria and frequency, no hematuria  SKIN: No sloughing  PSYCHIATRIC: No hallucinations  ENDOCRINE: no polyphagia  MUSCULOSKELETAL: no muscle weakness        Objective:        Vitals:    07/14/25 1355   BP: 118/64   Pulse: 68       Wt Readings from Last 2 Encounters:   07/14/25 50.1 kg (110 lb 7.2 oz)   05/20/25 51.3 kg (113 lb 3.2 oz)       PHYSICAL EXAM  CONSTITUTIONAL: In no apparent distress  HEENT: Normocephalic. Pupils normal and conjunctivae normal.   LUNGS: B/L air entry to the lungs  HEART: Normal rate and regular rhythm. Normal S1 and S2.   ABDOMEN: soft, non-tender  NEURO: AAO X 3, no gross sensory or motor deficits  SKIN:  Intact  Psych:  Normal affect     Lab Results   Component Value Date    WBC 6.2 03/31/2025    HGB 12.9 03/31/2025    HCT 40.5  "03/31/2025     03/31/2025    CHOL 191 03/31/2025    TRIG 88 03/31/2025    HDL 79 03/31/2025    ALT 16 03/31/2025    AST 18 03/31/2025     03/31/2025    K 4.0 03/31/2025     03/31/2025    CREATININE 0.56 (L) 03/31/2025    BUN 22 03/31/2025    CO2 29 03/31/2025    INR 1.0 11/18/2019    HGBA1C 5.5 03/31/2025    MICROALBUR 0.5 03/31/2025        @  Lab Results   Component Value Date    CHOL 191 03/31/2025    CHOL 194 08/21/2024    CHOL 209 (H) 03/15/2024     Lab Results   Component Value Date    HDL 79 03/31/2025    HDL 74 08/21/2024    HDL 90 03/15/2024     Lab Results   Component Value Date    LDLCALC 94 03/31/2025    LDLCALC 101 (H) 08/21/2024    LDLCALC 104 (H) 03/15/2024     No results found for: "DLDL"  Lab Results   Component Value Date    TRIG 88 03/31/2025    TRIG 94 08/21/2024    TRIG 64 03/15/2024         Current Outpatient Medications   Medication Instructions    ascorbate calcium, vitamin C, 500 mg Tab Vitamin C 500 mg tablet   Take 1 tablet every day by oral route.    calcium-vitamin D3 (OS-FERNANDA 500 + D3) 500 mg-5 mcg (200 unit) per tablet 1 tablet, 2 times daily with meals    celecoxib (CELEBREX) 200 mg, Oral, 2 times daily    fluticasone propionate (FLONASE) 50 mcg, Each Nostril, Daily    Lactobacillus acidophilus (PROBIOTIC) 10 billion cell Cap     magnesium 200 mg Tab magnesium    multivit-min-iron-FA-K.gin 18 mg iron-400 mcg-50 mg Tab 1 tablet, Daily    pantoprazole (PROTONIX) 40 mg, Oral, Daily    rosuvastatin (CRESTOR) 5 mg, Oral, Daily    sertraline (ZOLOFT) 50 mg, Oral, Daily       Medication List with Changes/Refills   Current Medications    ASCORBATE CALCIUM, VITAMIN C, 500 MG TAB    Vitamin C 500 mg tablet   Take 1 tablet every day by oral route.    CALCIUM-VITAMIN D3 (OS-FERNANDA 500 + D3) 500 MG-5 MCG (200 UNIT) PER TABLET    Take 1 tablet by mouth 2 (two) times daily with meals.    CELECOXIB (CELEBREX) 200 MG CAPSULE    Take 1 capsule (200 mg total) by mouth 2 (two) times daily. "    FLUTICASONE PROPIONATE (FLONASE) 50 MCG/ACTUATION NASAL SPRAY    1 spray (50 mcg total) by Each Nostril route once daily.    LACTOBACILLUS ACIDOPHILUS (PROBIOTIC) 10 BILLION CELL CAP        MAGNESIUM 200 MG TAB    magnesium    MULTIVIT-MIN-IRON-FA-K.GIN 18 MG IRON-400 MCG-50 MG TAB    Take 1 tablet by mouth once daily.     PANTOPRAZOLE (PROTONIX) 40 MG TABLET    Take 1 tablet (40 mg total) by mouth once daily.    ROSUVASTATIN (CRESTOR) 5 MG TABLET    Take 1 tablet (5 mg total) by mouth once daily.    SERTRALINE (ZOLOFT) 50 MG TABLET    Take 1 tablet (50 mg total) by mouth once daily.         All pertinent labs, imaging, and EKGs reviewed.  Patient's most recent EKG tracing was personally interpreted by this provider.    Problem List Items Addressed This Visit          Cardiac/Vascular    Mitral valve prolapse - Primary    Relevant Orders    IN OFFICE EKG 12-LEAD (to [place])    Echo     Other Visit Diagnoses         Mixed hyperlipidemia        Relevant Orders    Lipid Panel      Moderate mitral regurgitation                Follow up in about 6 months (around 1/14/2026).         [1]   Social History  Tobacco Use    Smoking status: Former    Smokeless tobacco: Never   Substance Use Topics    Alcohol use: Yes     Comment: ocassionally    Drug use: Never

## 2025-07-23 ENCOUNTER — HOSPITAL ENCOUNTER (OUTPATIENT)
Dept: CARDIOLOGY | Facility: HOSPITAL | Age: 75
Discharge: HOME | End: 2025-07-23
Attending: STUDENT IN AN ORGANIZED HEALTH CARE EDUCATION/TRAINING PROGRAM
Payer: MEDICARE

## 2025-07-23 VITALS — BODY MASS INDEX: 25.46 KG/M2 | WEIGHT: 110 LBS | HEIGHT: 55 IN

## 2025-07-23 DIAGNOSIS — I34.1 MITRAL VALVE PROLAPSE: ICD-10-CM

## 2025-07-23 LAB
AORTIC ROOT ANNULUS: 3.4 CM
AORTIC VALVE CUSP SEPERATION: 1.99 CM
APICAL FOUR CHAMBER EJECTION FRACTION: 54 %
APICAL TWO CHAMBER EJECTION FRACTION: 63 %
AV INDEX (PROSTH): 0.92
AV MEAN GRADIENT: 3 MMHG
AV PEAK GRADIENT: 6 MMHG
AV REGURGITATION PRESSURE HALF TIME: 714 MS
AV VALVE AREA BY VELOCITY RATIO: 2.6 CM²
AV VALVE AREA: 2.9 CM²
AV VELOCITY RATIO: 0.83
BSA FOR ECHO PROCEDURE: 1.39 M2
CV ECHO LV RWT: 0.44 CM
DOP CALC AO PEAK VEL: 1.2 M/S
DOP CALC AO VTI: 22.9 CM
DOP CALC LVOT AREA: 3.1 CM2
DOP CALC LVOT DIAMETER: 2 CM
DOP CALC LVOT PEAK VEL: 1 M/S
DOP CALC LVOT STROKE VOLUME: 65.9 CM3
DOP CALC MV VTI: 20.2 CM
DOP CALCLVOT PEAK VEL VTI: 21 CM
E WAVE DECELERATION TIME: 192 MSEC
E/A RATIO: 0.93
E/E' RATIO: 9 M/S
ECHO LV POSTERIOR WALL: 0.9 CM (ref 0.6–1.1)
FRACTIONAL SHORTENING: 31.7 % (ref 28–44)
INTERVENTRICULAR SEPTUM: 0.9 CM (ref 0.6–1.1)
IVC DIAMETER: 1.3 CM
LA MAJOR: 4.4 CM
LA MINOR: 4.6 CM
LEFT ATRIUM AREA SYSTOLIC (APICAL 2 CHAMBER): 17.69 CM2
LEFT ATRIUM AREA SYSTOLIC (APICAL 4 CHAMBER): 14.61 CM2
LEFT ATRIUM SIZE: 3.1 CM
LEFT ATRIUM VOLUME INDEX MOD: 35 ML/M2
LEFT ATRIUM VOLUME MOD: 48 ML
LEFT INTERNAL DIMENSION IN SYSTOLE: 2.8 CM (ref 2.1–4)
LEFT VENTRICLE DIASTOLIC VOLUME INDEX: 55.88 ML/M2
LEFT VENTRICLE DIASTOLIC VOLUME: 76 ML
LEFT VENTRICLE END DIASTOLIC VOLUME APICAL 2 CHAMBER: 52.96 ML
LEFT VENTRICLE END DIASTOLIC VOLUME APICAL 4 CHAMBER: 51.48 ML
LEFT VENTRICLE END SYSTOLIC VOLUME APICAL 2 CHAMBER: 49.02 ML
LEFT VENTRICLE END SYSTOLIC VOLUME APICAL 4 CHAMBER: 40.27 ML
LEFT VENTRICLE MASS INDEX: 83.9 G/M2
LEFT VENTRICLE SYSTOLIC VOLUME INDEX: 21.3 ML/M2
LEFT VENTRICLE SYSTOLIC VOLUME: 29 ML
LEFT VENTRICULAR INTERNAL DIMENSION IN DIASTOLE: 4.1 CM (ref 3.5–6)
LEFT VENTRICULAR MASS: 114.1 G
LV LATERAL E/E' RATIO: 8.5 M/S
LV SEPTAL E/E' RATIO: 9.7 M/S
LVED V (TEICH): 75.63 ML
LVES V (TEICH): 29.15 ML
LVOT MG: 2.14 MMHG
LVOT MV: 0.68 CM/S
MV MEAN GRADIENT: 1 MMHG
MV PEAK A VEL: 0.73 M/S
MV PEAK E VEL: 0.68 M/S
MV PEAK GRADIENT: 2 MMHG
MV STENOSIS PRESSURE HALF TIME: 61.58 MS
MV VALVE AREA BY CONTINUITY EQUATION: 3.26 CM2
MV VALVE AREA P 1/2 METHOD: 3.57 CM2
OHS CV CPX PATIENT HEIGHT IN: 55
OHS LV EJECTION FRACTION SIMPSONS BIPLANE MOD: 59 %
PISA AR MAX VEL: 3.45 M/S
PISA MRMAX VEL: 5.01 M/S
PISA TR MAX VEL: 2.5 M/S
PV MV: 0.68 M/S
PV PEAK GRADIENT: 3 MMHG
PV PEAK VELOCITY: 0.92 M/S
RA MAJOR: 4.45 CM
RA VOL SYS: 22.66 ML
RIGHT ATRIAL AREA: 10.7 CM2
RIGHT ATRIUM END SYSTOLIC VOLUME APICAL 4 CHAMBER INDEX BSA: 16.23 ML/M2
RIGHT ATRIUM VOLUME AREA LENGTH APICAL 4 CHAMBER: 22.07 ML
RV TISSUE DOPPLER FREE WALL SYSTOLIC VELOCITY 1 (APICAL 4 CHAMBER VIEW): 15.03 CM/S
STJ: 3 CM
TDI LATERAL: 0.08 M/S
TDI SEPTAL: 0.07 M/S
TDI: 0.08 M/S
TR MAX PG: 25 MMHG
Z-SCORE OF LEFT VENTRICULAR DIMENSION IN END DIASTOLE: -0.51
Z-SCORE OF LEFT VENTRICULAR DIMENSION IN END SYSTOLE: 0.34

## 2025-07-23 PROCEDURE — 93306 TTE W/DOPPLER COMPLETE: CPT

## 2025-07-25 ENCOUNTER — TELEPHONE (OUTPATIENT)
Dept: CARDIOLOGY | Facility: CLINIC | Age: 75
End: 2025-07-25

## 2025-07-25 NOTE — TELEPHONE ENCOUNTER
Copied from CRM #[id number]. Topic: General Inquiry - Test Results  >> Jul 25, 2025 11:40 AM Med Assistant [place] wrote:  Type:  Test Results    Who Called: patient  Name of Test (Lab/Mammo/Etc): echo  Date of Test: 0723/25  Ordering Provider: Dr. Beltran  Where the test was performed: [facility]  Would the patient rather a call back or a response via [facility]? call  Best Call Back Number: [phone]    Additional Information:  Please call patient to advise.  Thanks!

## 2025-07-25 NOTE — TELEPHONE ENCOUNTER
Patient was called and informed Dr Beltran out of the office today but will be back next week. Echo was just done 2 days ago but should be read next week. Pt understood and will await results via [facility].

## 2025-08-18 ENCOUNTER — PATIENT MESSAGE (OUTPATIENT)
Dept: FAMILY MEDICINE | Facility: CLINIC | Age: 75
End: 2025-08-18
Payer: MEDICARE

## 2025-08-18 ENCOUNTER — TELEPHONE (OUTPATIENT)
Dept: FAMILY MEDICINE | Facility: CLINIC | Age: 75
End: 2025-08-18
Payer: MEDICARE

## 2025-08-18 DIAGNOSIS — M25.511 CHRONIC RIGHT SHOULDER PAIN: Primary | ICD-10-CM

## 2025-08-18 DIAGNOSIS — M25.512 CHRONIC LEFT SHOULDER PAIN: Primary | ICD-10-CM

## 2025-08-18 DIAGNOSIS — G89.29 CHRONIC RIGHT SHOULDER PAIN: Primary | ICD-10-CM

## 2025-08-18 DIAGNOSIS — F32.5 MAJOR DEPRESSIVE DISORDER WITH SINGLE EPISODE, IN REMISSION: ICD-10-CM

## 2025-08-18 DIAGNOSIS — G89.29 CHRONIC LEFT SHOULDER PAIN: Primary | ICD-10-CM

## 2025-08-19 RX ORDER — SERTRALINE HYDROCHLORIDE 50 MG/1
50 TABLET, FILM COATED ORAL DAILY
Qty: 90 TABLET | Refills: 0 | Status: SHIPPED | OUTPATIENT
Start: 2025-08-19 | End: 2025-11-17

## 2025-08-20 ENCOUNTER — HOSPITAL ENCOUNTER (OUTPATIENT)
Dept: RADIOLOGY | Facility: HOSPITAL | Age: 75
Discharge: HOME | End: 2025-08-20
Attending: NURSE PRACTITIONER
Payer: MEDICARE

## 2025-08-20 ENCOUNTER — TELEPHONE (OUTPATIENT)
Dept: FAMILY MEDICINE | Facility: CLINIC | Age: 75
End: 2025-08-20
Payer: MEDICARE

## 2025-08-20 DIAGNOSIS — M25.511 CHRONIC RIGHT SHOULDER PAIN: ICD-10-CM

## 2025-08-20 DIAGNOSIS — M25.512 CHRONIC LEFT SHOULDER PAIN: ICD-10-CM

## 2025-08-20 DIAGNOSIS — G89.29 CHRONIC LEFT SHOULDER PAIN: ICD-10-CM

## 2025-08-20 DIAGNOSIS — G89.29 CHRONIC RIGHT SHOULDER PAIN: ICD-10-CM

## 2025-08-20 PROCEDURE — 73030 X-RAY EXAM OF SHOULDER: CPT | Mod: TC,PO,LT

## 2025-08-20 PROCEDURE — 73030 X-RAY EXAM OF SHOULDER: CPT | Mod: 26,LT,, | Performed by: RADIOLOGY

## 2025-08-27 ENCOUNTER — PATIENT MESSAGE (OUTPATIENT)
Dept: FAMILY MEDICINE | Facility: CLINIC | Age: 75
End: 2025-08-27
Payer: MEDICARE

## 2025-08-27 DIAGNOSIS — M25.512 CHRONIC LEFT SHOULDER PAIN: Primary | ICD-10-CM

## 2025-08-27 DIAGNOSIS — G89.29 CHRONIC LEFT SHOULDER PAIN: Primary | ICD-10-CM

## 2025-09-04 ENCOUNTER — OFFICE VISIT (OUTPATIENT)
Dept: ORTHOPEDICS | Facility: CLINIC | Age: 75
End: 2025-09-04
Payer: MEDICARE

## 2025-09-04 VITALS — HEIGHT: 55 IN | WEIGHT: 110 LBS | BODY MASS INDEX: 25.46 KG/M2

## 2025-09-04 DIAGNOSIS — M75.102 ROTATOR CUFF SYNDROME OF LEFT SHOULDER: Primary | ICD-10-CM

## 2025-09-04 DIAGNOSIS — M75.100 ROTATOR CUFF SYNDROME, UNSPECIFIED LATERALITY: Primary | ICD-10-CM

## 2025-09-04 PROCEDURE — 99999 PR PBB SHADOW E&M-EST. PATIENT-LVL III: CPT | Mod: PBBFAC,HCNC,, | Performed by: ORTHOPAEDIC SURGERY

## 2025-09-04 PROCEDURE — 1160F RVW MEDS BY RX/DR IN RCRD: CPT | Mod: CPTII,HCNC,S$GLB, | Performed by: ORTHOPAEDIC SURGERY

## 2025-09-04 PROCEDURE — 99214 OFFICE O/P EST MOD 30 MIN: CPT | Mod: HCNC,S$GLB,, | Performed by: ORTHOPAEDIC SURGERY

## 2025-09-04 PROCEDURE — 1159F MED LIST DOCD IN RCRD: CPT | Mod: CPTII,HCNC,S$GLB, | Performed by: ORTHOPAEDIC SURGERY

## 2025-09-04 PROCEDURE — 3008F BODY MASS INDEX DOCD: CPT | Mod: CPTII,HCNC,S$GLB, | Performed by: ORTHOPAEDIC SURGERY

## 2025-09-04 PROCEDURE — 3044F HG A1C LEVEL LT 7.0%: CPT | Mod: CPTII,HCNC,S$GLB, | Performed by: ORTHOPAEDIC SURGERY

## 2025-09-04 PROCEDURE — 1125F AMNT PAIN NOTED PAIN PRSNT: CPT | Mod: CPTII,HCNC,S$GLB, | Performed by: ORTHOPAEDIC SURGERY

## 2025-09-05 ENCOUNTER — TELEPHONE (OUTPATIENT)
Dept: ORTHOPEDICS | Facility: CLINIC | Age: 75
End: 2025-09-05
Payer: MEDICARE